# Patient Record
Sex: FEMALE | Race: WHITE | Employment: OTHER | URBAN - METROPOLITAN AREA
[De-identification: names, ages, dates, MRNs, and addresses within clinical notes are randomized per-mention and may not be internally consistent; named-entity substitution may affect disease eponyms.]

---

## 2017-03-08 ENCOUNTER — OFFICE VISIT (OUTPATIENT)
Dept: DERMATOLOGY | Age: 62
End: 2017-03-08

## 2017-03-08 DIAGNOSIS — L82.1 SEBORRHEIC KERATOSES: ICD-10-CM

## 2017-03-08 DIAGNOSIS — D22.9 BENIGN NEVUS: ICD-10-CM

## 2017-03-08 DIAGNOSIS — Z86.018 HISTORY OF DYSPLASTIC NEVUS: ICD-10-CM

## 2017-03-08 DIAGNOSIS — Z85.828 HISTORY OF BASAL CELL CANCER: ICD-10-CM

## 2017-03-08 DIAGNOSIS — D48.5 NEOPLASM OF UNCERTAIN BEHAVIOR OF SKIN: Primary | ICD-10-CM

## 2017-03-08 PROCEDURE — G8484 FLU IMMUNIZE NO ADMIN: HCPCS | Performed by: DERMATOLOGY

## 2017-03-08 PROCEDURE — 99213 OFFICE O/P EST LOW 20 MIN: CPT | Performed by: DERMATOLOGY

## 2017-03-08 PROCEDURE — 11101 PR BIOPSY, EACH ADDED LESION: CPT | Performed by: DERMATOLOGY

## 2017-03-08 PROCEDURE — 3014F SCREEN MAMMO DOC REV: CPT | Performed by: DERMATOLOGY

## 2017-03-08 PROCEDURE — 11100 PR BIOPSY OF SKIN LESION: CPT | Performed by: DERMATOLOGY

## 2017-03-08 PROCEDURE — G8427 DOCREV CUR MEDS BY ELIG CLIN: HCPCS | Performed by: DERMATOLOGY

## 2017-03-08 PROCEDURE — 1036F TOBACCO NON-USER: CPT | Performed by: DERMATOLOGY

## 2017-03-08 PROCEDURE — 3017F COLORECTAL CA SCREEN DOC REV: CPT | Performed by: DERMATOLOGY

## 2017-03-08 PROCEDURE — G8421 BMI NOT CALCULATED: HCPCS | Performed by: DERMATOLOGY

## 2017-03-14 ENCOUNTER — TELEPHONE (OUTPATIENT)
Dept: DERMATOLOGY | Age: 62
End: 2017-03-14

## 2017-03-15 ENCOUNTER — OFFICE VISIT (OUTPATIENT)
Dept: DERMATOLOGY | Age: 62
End: 2017-03-15

## 2017-03-15 DIAGNOSIS — C44.619 BASAL CELL CARCINOMA OF LEFT UPPER ARM: Primary | ICD-10-CM

## 2017-03-15 PROCEDURE — 17261 DSTRJ MAL LES T/A/L .6-1.0CM: CPT | Performed by: DERMATOLOGY

## 2017-03-15 PROCEDURE — 1036F TOBACCO NON-USER: CPT | Performed by: DERMATOLOGY

## 2017-07-10 ENCOUNTER — OFFICE VISIT (OUTPATIENT)
Dept: CARDIOLOGY CLINIC | Age: 62
End: 2017-07-10

## 2017-07-10 VITALS
BODY MASS INDEX: 23.05 KG/M2 | SYSTOLIC BLOOD PRESSURE: 120 MMHG | DIASTOLIC BLOOD PRESSURE: 70 MMHG | HEART RATE: 78 BPM | HEIGHT: 64 IN | WEIGHT: 135 LBS

## 2017-07-10 DIAGNOSIS — I34.0 MITRAL VALVE INSUFFICIENCY, UNSPECIFIED ETIOLOGY: ICD-10-CM

## 2017-07-10 DIAGNOSIS — I34.1 MVP (MITRAL VALVE PROLAPSE): ICD-10-CM

## 2017-07-10 DIAGNOSIS — I48.92 ATRIAL FLUTTER, PAROXYSMAL (HCC): Primary | Chronic | ICD-10-CM

## 2017-07-10 PROCEDURE — G8420 CALC BMI NORM PARAMETERS: HCPCS | Performed by: INTERNAL MEDICINE

## 2017-07-10 PROCEDURE — 3014F SCREEN MAMMO DOC REV: CPT | Performed by: INTERNAL MEDICINE

## 2017-07-10 PROCEDURE — 93000 ELECTROCARDIOGRAM COMPLETE: CPT | Performed by: INTERNAL MEDICINE

## 2017-07-10 PROCEDURE — G8427 DOCREV CUR MEDS BY ELIG CLIN: HCPCS | Performed by: INTERNAL MEDICINE

## 2017-07-10 PROCEDURE — 99214 OFFICE O/P EST MOD 30 MIN: CPT | Performed by: INTERNAL MEDICINE

## 2017-07-10 PROCEDURE — 1036F TOBACCO NON-USER: CPT | Performed by: INTERNAL MEDICINE

## 2017-07-10 PROCEDURE — 3017F COLORECTAL CA SCREEN DOC REV: CPT | Performed by: INTERNAL MEDICINE

## 2017-07-10 RX ORDER — DILTIAZEM HYDROCHLORIDE 60 MG/1
60 TABLET, FILM COATED ORAL PRN
Qty: 30 TABLET | Refills: 3 | Status: SHIPPED | OUTPATIENT
Start: 2017-07-10 | End: 2018-09-13 | Stop reason: SDUPTHER

## 2017-11-06 ENCOUNTER — OFFICE VISIT (OUTPATIENT)
Dept: DERMATOLOGY | Age: 62
End: 2017-11-06

## 2017-11-06 DIAGNOSIS — L71.9 ROSACEA: ICD-10-CM

## 2017-11-06 DIAGNOSIS — L57.0 KERATOSIS, ACTINIC: ICD-10-CM

## 2017-11-06 DIAGNOSIS — Z85.828 HISTORY OF BASAL CELL CANCER: ICD-10-CM

## 2017-11-06 DIAGNOSIS — D22.9 BENIGN NEVUS: ICD-10-CM

## 2017-11-06 DIAGNOSIS — L82.1 SEBORRHEIC KERATOSES: ICD-10-CM

## 2017-11-06 DIAGNOSIS — D48.5 NEOPLASM OF UNCERTAIN BEHAVIOR OF SKIN: Primary | ICD-10-CM

## 2017-11-06 DIAGNOSIS — Z86.018 HISTORY OF DYSPLASTIC NEVUS: ICD-10-CM

## 2017-11-06 PROCEDURE — 17000 DESTRUCT PREMALG LESION: CPT | Performed by: DERMATOLOGY

## 2017-11-06 PROCEDURE — 3014F SCREEN MAMMO DOC REV: CPT | Performed by: DERMATOLOGY

## 2017-11-06 PROCEDURE — 99214 OFFICE O/P EST MOD 30 MIN: CPT | Performed by: DERMATOLOGY

## 2017-11-06 PROCEDURE — G8427 DOCREV CUR MEDS BY ELIG CLIN: HCPCS | Performed by: DERMATOLOGY

## 2017-11-06 PROCEDURE — 1036F TOBACCO NON-USER: CPT | Performed by: DERMATOLOGY

## 2017-11-06 PROCEDURE — G8420 CALC BMI NORM PARAMETERS: HCPCS | Performed by: DERMATOLOGY

## 2017-11-06 PROCEDURE — 3017F COLORECTAL CA SCREEN DOC REV: CPT | Performed by: DERMATOLOGY

## 2017-11-06 PROCEDURE — G8484 FLU IMMUNIZE NO ADMIN: HCPCS | Performed by: DERMATOLOGY

## 2017-11-06 PROCEDURE — 11100 PR BIOPSY OF SKIN LESION: CPT | Performed by: DERMATOLOGY

## 2017-11-06 PROCEDURE — 17003 DESTRUCT PREMALG LES 2-14: CPT | Performed by: DERMATOLOGY

## 2017-11-06 NOTE — PROGRESS NOTES
[facility]) Dermatology  Francisco Hammer M.D.  [phone]       Judith [facility]  1955    60 y.o. female     Date of Visit: 11/6/2017    Chief Complaint:   Chief Complaint   Patient presents with    6 Month Follow-Up        I was asked to see this patient by Dr. Carrington ref. provider found. History of Present Illness:  1. Total body skin exam    Multiple rough papules on her nose. Remain dry. Not itching, bleeding, growing    Worsening erythema of nose and cheeks-occasional inflammatory papules on her nose. Mild scale nose and cheeks. Multiple nevi. Stable in size, shape, color. Asymptomatic. Skin history:  7/14 basal cell carcinoma nose [facility], status post Mohs surgery and forehead flap  11/15 right upper paraspinal back dysplastic nevus, mild, saucerization  11/15 right posterior shoulder dysplastic nevus, mild, extended the biopsy margins on initial biopsy- re-saucerization  4/16 right mid paraspinal back dysplastic nevus, mild  4/16 right anterior shoulder dysplastic nevus, mild  4/16 left mid upper arm dysplastic nevus, mild  3/17 nodular basal cell carcinoma left posterior upper arm status post curettage    AK- LN2    Rosacea- Metrocream .75% 11/17    Review of Systems:  Constitutional: Reports general sense of well-being       Past Medical History, Surgical History, Family History, Medications and Allergies reviewed. Social History:   Social History     Social History    Marital status:      Spouse name: N/A    Number of children: N/A    Years of education: N/A     Occupational History    Not on file. Social History Main Topics    Smoking status: Never Smoker    Smokeless tobacco: Never Used    Alcohol use Yes      Comment: occas    Drug use: No    Sexual activity: Not on file     Other Topics Concern    Not on file     Social History Narrative    No narrative on file       Physical Examination       -General: Well-appearing, NAD  Normal affect. MetroCream 0.75% b.i.d. Reviewed proper use and side effects    5. History of basal cell cancer - No evidence of recurrence. Discussed risk of subsequent skin cancers and increased risk of melanoma. Reviewed importance of monitoring skin for change and sun protection with hats and sunscreen, as well as sun avoidance. 6. History of dysplastic nevus -No evidence of recurrence    7. Actinic keratosis nose-5 lesion(s) treated w/ liquid nitrogen. Edu re: risk of blister formation, discomfort, scar, hypopigmentation. Discussed wound care.

## 2017-11-08 ENCOUNTER — TELEPHONE (OUTPATIENT)
Dept: DERMATOLOGY | Age: 62
End: 2017-11-08

## 2017-11-08 NOTE — TELEPHONE ENCOUNTER
Reviewed results of the biopsy with the patient. Date of biopsy: 11/6/17  Site of biopsy: Right upper PS back  Result: Dysplastic nevus with mild dysplasia, completely excised    Plan: No further treatment    The patient expressed understanding of the plan.

## 2017-11-14 ENCOUNTER — HOSPITAL ENCOUNTER (OUTPATIENT)
Dept: MAMMOGRAPHY | Age: 62
Discharge: HOME | End: 2017-11-14
Attending: OBSTETRICS & GYNECOLOGY | Admitting: OBSTETRICS & GYNECOLOGY

## 2017-11-14 DIAGNOSIS — Z12.31 VISIT FOR SCREENING MAMMOGRAM: ICD-10-CM

## 2017-11-17 ENCOUNTER — HOSPITAL ENCOUNTER (OUTPATIENT)
Dept: MAMMOGRAPHY | Age: 62
Discharge: HOME | End: 2017-11-17
Attending: OBSTETRICS & GYNECOLOGY | Admitting: OBSTETRICS & GYNECOLOGY

## 2017-11-17 DIAGNOSIS — R92.8 ABNORMAL FINDING ON BREAST IMAGING: ICD-10-CM

## 2018-01-22 ENCOUNTER — HOSPITAL ENCOUNTER (OUTPATIENT)
Dept: SURGERY | Age: 63
Discharge: HOME | End: 2018-01-22
Attending: ORTHOPAEDIC SURGERY | Admitting: ORTHOPAEDIC SURGERY

## 2018-01-22 VITALS
RESPIRATION RATE: 12 BRPM | HEIGHT: 65 IN | OXYGEN SATURATION: 98 % | SYSTOLIC BLOOD PRESSURE: 121 MMHG | HEART RATE: 84 BPM | DIASTOLIC BLOOD PRESSURE: 68 MMHG | TEMPERATURE: 98.6 F | BODY MASS INDEX: 22.26 KG/M2 | WEIGHT: 133.6 LBS

## 2018-01-22 DIAGNOSIS — S42.271A CLOSED TORUS FRACTURE OF PROXIMAL END OF RIGHT HUMERUS, INITIAL ENCOUNTER: Primary | ICD-10-CM

## 2018-01-22 LAB
ANION GAP SERPL CALCULATED.3IONS-SCNC: 11 MMOL/L (ref 3–16)
BUN BLDV-MCNC: 9 MG/DL (ref 7–20)
CALCIUM SERPL-MCNC: 9.4 MG/DL (ref 8.3–10.6)
CHLORIDE BLD-SCNC: 100 MMOL/L (ref 99–110)
CO2: 29 MMOL/L (ref 21–32)
CREAT SERPL-MCNC: 0.6 MG/DL (ref 0.6–1.2)
GFR AFRICAN AMERICAN: >60
GFR NON-AFRICAN AMERICAN: >60
GLUCOSE BLD-MCNC: 91 MG/DL (ref 70–99)
HCT VFR BLD CALC: 38.9 % (ref 36–48)
HEMOGLOBIN: 13.2 G/DL (ref 12–16)
MCH RBC QN AUTO: 31.5 PG (ref 26–34)
MCHC RBC AUTO-ENTMCNC: 34 G/DL (ref 31–36)
MCV RBC AUTO: 92.6 FL (ref 80–100)
PDW BLD-RTO: 12.4 % (ref 12.4–15.4)
PLATELET # BLD: 233 K/UL (ref 135–450)
PMV BLD AUTO: 9 FL (ref 5–10.5)
POTASSIUM REFLEX MAGNESIUM: 4.1 MMOL/L (ref 3.5–5.1)
RBC # BLD: 4.2 M/UL (ref 4–5.2)
SODIUM BLD-SCNC: 140 MMOL/L (ref 136–145)
WBC # BLD: 7.2 K/UL (ref 4–11)

## 2018-01-22 PROCEDURE — 99244 OFF/OP CNSLTJ NEW/EST MOD 40: CPT | Performed by: ORTHOPAEDIC SURGERY

## 2018-01-22 PROCEDURE — 23615 OPTX PROX HUMRL FX W/INT FIX: CPT | Performed by: ORTHOPAEDIC SURGERY

## 2018-01-22 PROCEDURE — 93010 ELECTROCARDIOGRAM REPORT: CPT | Performed by: INTERNAL MEDICINE

## 2018-01-22 RX ORDER — SODIUM CHLORIDE 9 MG/ML
INJECTION, SOLUTION INTRAVENOUS CONTINUOUS
Status: DISCONTINUED | OUTPATIENT
Start: 2018-01-22 | End: 2018-01-23 | Stop reason: HOSPADM

## 2018-01-22 RX ORDER — CEPHALEXIN 500 MG/1
500 CAPSULE ORAL 4 TIMES DAILY
Qty: 20 CAPSULE | Refills: 0 | Status: SHIPPED | OUTPATIENT
Start: 2018-01-22 | End: 2018-01-27

## 2018-01-22 RX ORDER — LABETALOL HYDROCHLORIDE 5 MG/ML
5 INJECTION, SOLUTION INTRAVENOUS EVERY 10 MIN PRN
Status: DISCONTINUED | OUTPATIENT
Start: 2018-01-22 | End: 2018-01-23 | Stop reason: HOSPADM

## 2018-01-22 RX ORDER — SODIUM CHLORIDE 0.9 % (FLUSH) 0.9 %
10 SYRINGE (ML) INJECTION PRN
Status: DISCONTINUED | OUTPATIENT
Start: 2018-01-22 | End: 2018-01-23 | Stop reason: HOSPADM

## 2018-01-22 RX ORDER — OXYCODONE HYDROCHLORIDE AND ACETAMINOPHEN 5; 325 MG/1; MG/1
1 TABLET ORAL EVERY 6 HOURS PRN
Qty: 28 TABLET | Refills: 0 | Status: SHIPPED | OUTPATIENT
Start: 2018-01-22 | End: 2018-01-29

## 2018-01-22 RX ORDER — PROMETHAZINE HYDROCHLORIDE 25 MG/ML
6.25 INJECTION, SOLUTION INTRAMUSCULAR; INTRAVENOUS
Status: ACTIVE | OUTPATIENT
Start: 2018-01-22 | End: 2018-01-22

## 2018-01-22 RX ORDER — SODIUM CHLORIDE 0.9 % (FLUSH) 0.9 %
10 SYRINGE (ML) INJECTION EVERY 12 HOURS SCHEDULED
Status: DISCONTINUED | OUTPATIENT
Start: 2018-01-22 | End: 2018-01-23 | Stop reason: HOSPADM

## 2018-01-22 RX ORDER — FENTANYL CITRATE 50 UG/ML
25 INJECTION, SOLUTION INTRAMUSCULAR; INTRAVENOUS EVERY 5 MIN PRN
Status: DISCONTINUED | OUTPATIENT
Start: 2018-01-22 | End: 2018-01-23 | Stop reason: HOSPADM

## 2018-01-22 RX ORDER — OXYCODONE HYDROCHLORIDE AND ACETAMINOPHEN 5; 325 MG/1; MG/1
2 TABLET ORAL
Status: COMPLETED | OUTPATIENT
Start: 2018-01-22 | End: 2018-01-22

## 2018-01-22 RX ADMIN — FENTANYL CITRATE 25 MCG: 50 INJECTION, SOLUTION INTRAMUSCULAR; INTRAVENOUS at 14:49

## 2018-01-22 RX ADMIN — SODIUM CHLORIDE: 9 INJECTION, SOLUTION INTRAVENOUS at 11:36

## 2018-01-22 RX ADMIN — FENTANYL CITRATE 25 MCG: 50 INJECTION, SOLUTION INTRAMUSCULAR; INTRAVENOUS at 14:36

## 2018-01-22 RX ADMIN — OXYCODONE HYDROCHLORIDE AND ACETAMINOPHEN 1 TABLET: 5; 325 TABLET ORAL at 16:09

## 2018-01-22 ASSESSMENT — PAIN DESCRIPTION - LOCATION
LOCATION: ARM

## 2018-01-22 ASSESSMENT — PAIN DESCRIPTION - DESCRIPTORS
DESCRIPTORS: CONSTANT;ACHING
DESCRIPTORS: ACHING
DESCRIPTORS: ACHING

## 2018-01-22 ASSESSMENT — PAIN DESCRIPTION - PROGRESSION
CLINICAL_PROGRESSION: GRADUALLY IMPROVING
CLINICAL_PROGRESSION: NOT CHANGED

## 2018-01-22 ASSESSMENT — PAIN - FUNCTIONAL ASSESSMENT: PAIN_FUNCTIONAL_ASSESSMENT: 0-10

## 2018-01-22 ASSESSMENT — PAIN DESCRIPTION - ORIENTATION
ORIENTATION: RIGHT
ORIENTATION: RIGHT;UPPER
ORIENTATION: RIGHT;UPPER

## 2018-01-22 ASSESSMENT — PAIN DESCRIPTION - FREQUENCY: FREQUENCY: CONTINUOUS

## 2018-01-22 ASSESSMENT — PAIN SCALES - GENERAL
PAINLEVEL_OUTOF10: 5
PAINLEVEL_OUTOF10: 6

## 2018-01-22 ASSESSMENT — PAIN DESCRIPTION - PAIN TYPE
TYPE: SURGICAL PAIN

## 2018-01-22 ASSESSMENT — PAIN DESCRIPTION - ONSET: ONSET: AWAKENED FROM SLEEP

## 2018-01-22 NOTE — H&P
Preoperative H&P Update    The patient's History and Physical in the medical record from 1/22/2018 was reviewed by me today. I reviewed the HPI, medications, allergies, reason for surgery, diagnosis and treatment plan and there has been no change. The patient was evaluated by me today. Physical exam findings for this update include: There were no vitals filed for this visit. Airway is intact  Chest: chest clear, no wheezing, rales, normal symmetric air entry, no tachypnea, retractions or cyanosis  Heart: regular rate and rhythm ; heart sounds normal  Findings on exam of the body region where surgery is to be performed include:  Right proximal humerus fracture.     Electronically signed by Lisa Irvin MD on 1/22/2018 at 11:03 AM

## 2018-01-22 NOTE — CONSULTS
[facility] Orthopedic Surgery  Consult Note        This patient is seen in consultation at the request of Cele Brizuela NP    Reason for Consult:   Right shoulder pain/ moderately displaced proximal humerus fracture. CHIEF COMPLAINT:   Right shoulder pain/ fall. History Obtained From:  patient, spouse, electronic medical record    HISTORY OF PRESENT ILLNESS:    Ms. Nilesh De Leon is a 60 y.o.  female right handed who presents today for consultation and evaluation of a right shoulder injury. The patient reports that this injury occurred when she fell on ice. She was first seen and evaluated in , when she was x-rayed and splinted, and asked to f/u with me. The patient denies any other injuries. Rates pain a 7/10 VAS moderate, sharp, intermittent and show no change. Alleviating factors rest. Movement makes the pain worse, the sling and resting makes the pain better. No numbness or tingling sensation. Past Medical History:        Diagnosis Date    Atrial flutter, paroxysmal ([address] 1/14    Dr Zuñiga= post viral infection no sx 2/2015    BCC (basal cell carcinoma), face 2014       Past Surgical History:        Procedure Laterality Date    COSMETIC SURGERY  2014    On nose    SKIN CANCER EXCISION  2014    BCC left nostril       Current Medications:   No current facility-administered medications for this encounter. Allergies:  Review of patient's allergies indicates no known allergies. Social History     Social History    Marital status:      Spouse name: N/A    Number of children: N/A    Years of education: N/A     Occupational History    Not on file.      Social History Main Topics    Smoking status: Never Smoker    Smokeless tobacco: Never Used    Alcohol use Yes      Comment: occas    Drug use: No    Sexual activity: Not on file     Other Topics Concern    Not on file     Social History Narrative    No narrative on file       Family History   Problem Relation Age of Onset tender over the remainder of the extremity. Range of motion is decreased secondary to pain over the right shoulder. The skin overlying the right shoulder is intact without evidence of lesion, laceration. Distal pulses are 2+ and symmetric bilaterally. Sensation is grossly intact to light touch and symmetric bilaterally. NEUROLOGIC:   Sensory:    Touch:                     Right Upper Extremity:  normal                   Left Upper Extremity:  normal                  Right Lower Extremity:  normal                  Left Lower Extremity:  normal        DATA:    CBC:   Lab Results   Component Value Date    WBC 5.9 02/14/2014    RBC 4.26 02/14/2014    HGB 13.1 02/14/2014    HCT 39.3 02/14/2014    MCV 92.3 02/14/2014    MCH 30.8 02/14/2014    MCHC 33.4 02/14/2014    RDW 12.7 02/14/2014     02/14/2014    MPV 8.6 02/14/2014     WBC:    Lab Results   Component Value Date    WBC 5.9 02/14/2014     PT/INR:    Lab Results   Component Value Date    PROTIME 10.4 02/13/2014    INR 0.93 02/13/2014     PTT:    Lab Results   Component Value Date    APTT 53.2 02/13/2014   [APTT    IMAGING: Xrays dated 1/20/2018, 3 views of right shoulder were reviewed, and showed moderately displaced proximal humerus fracture. IMPRESSION: Right shoulder pain/ moderately displaced proximal humerus fracture. PLAN:  I discussed the overall alignment of the fracture with the  patient, and treatment options including both surgical and non-surgical treatment, and that my recommendation would be an open reduction and internal fixation given the amount of displacement and comminution of the fracture. I discussed the risks and benefits of surgery with the patient, including but not limited to infection, bleeding, pain, injury to nerves or blood vessels failure of the surgery and need for additional surgery. All the patient's questions were answered. We discussed an expected post-operative course.   She  is understanding of this

## 2018-01-22 NOTE — PROGRESS NOTES
Awakened to name, OPA removed
Fentanyl 25mcg ivp for \"5\" right arm pain
Received from PACU. Admitted to Phase 2 care. Awake and alert, respirations easy and even. Oriented to room and surroundings. Continues with pain.
Report to Tamra Coy
To PACU from OR, unresponsive, OPA present
established preoperatively. 23.  The patient/caregiver demonstrates knowledge of the expected responses to the operative or invasive procedure. 20.  Patient/Caregiver has reduced anxiety. Interventions-Familiarize with environment and equipment.   21.  Other:  22.  Other:

## 2018-01-22 NOTE — ANESTHESIA PRE-OP
GI/Hepatic/Renal ROS            Endo/Other: Negative Endo/Other ROS                    Abdominal:           Vascular: negative vascular ROS. Anesthesia Plan      general     ASA 3       Induction: intravenous. MIPS: Postoperative opioids intended and Prophylactic antiemetics administered. Anesthetic plan and risks discussed with patient and spouse. Use of blood products discussed with patient and spouse whom consented to blood products. Plan discussed with CRNA. This pre-anesthesia assessment may be used as a history and physical.    DOS STAFF ADDENDUM:    Pt seen and examined, chart reviewed (including anesthesia, drug and allergy history). No interval changes to history and physical examination. Anesthetic plan, risks, benefits, alternatives, and personnel involved discussed with patient. Patient verbalized an understanding and agrees to proceed.       Luis Felipe Up MD  January 22, 2018  11:39 AM

## 2018-01-23 LAB
EKG ATRIAL RATE: 86 BPM
EKG DIAGNOSIS: NORMAL
EKG P AXIS: 72 DEGREES
EKG P-R INTERVAL: 134 MS
EKG Q-T INTERVAL: 376 MS
EKG QRS DURATION: 76 MS
EKG QTC CALCULATION (BAZETT): 449 MS
EKG R AXIS: 67 DEGREES
EKG T AXIS: 26 DEGREES
EKG VENTRICULAR RATE: 86 BPM

## 2018-02-07 ENCOUNTER — OFFICE VISIT (OUTPATIENT)
Dept: ORTHOPEDIC SURGERY | Age: 63
End: 2018-02-07

## 2018-02-07 VITALS
HEIGHT: 64 IN | RESPIRATION RATE: 16 BRPM | DIASTOLIC BLOOD PRESSURE: 66 MMHG | WEIGHT: 133 LBS | HEART RATE: 78 BPM | SYSTOLIC BLOOD PRESSURE: 109 MMHG | BODY MASS INDEX: 22.71 KG/M2

## 2018-02-07 DIAGNOSIS — S42.291A OTHER CLOSED DISPLACED FRACTURE OF PROXIMAL END OF RIGHT HUMERUS, INITIAL ENCOUNTER: Primary | ICD-10-CM

## 2018-02-07 PROCEDURE — 99024 POSTOP FOLLOW-UP VISIT: CPT | Performed by: NURSE PRACTITIONER

## 2018-02-07 NOTE — LETTER
indicated. The patient is advised to contact the primary care physician to follow-up for further evaluation. Reza Jaime CNP  If you have questions, please do not hesitate to call me. I look forward to following [facility] along with you.     Sincerely,        Sara Arias MD

## 2018-02-08 NOTE — PROGRESS NOTES
DIAGNOSIS: Right proximal humerus displaced fracture, status post ORIF. DATE OF SURGERY:  1/22/2018. HISTORY OF PRESENT ILLNESS:  Ms. Maurizio Zacarias  60 y.o.  female right handed, who came in today for 2 weeks postoperative visit. The patient denies any significant pain in the right shoulder. Rates pain a 0-1/10 VAS mild, aching, intermittent and are improving. Aggravating factors movement. Alleviating factors rest and sling. She has been working on gentle ROM. No numbness or tingling sensation. No fever or Chills. She is in a sling. PHYSICAL EXAMINATION:  The incision is healed well. No signs of any erythema or drainage. She has no pain with the active or passive range of motion of the right shoulder, but decrease ROM. She has intact sensation, distally, and  is neurovascularly intact. IMAGING:  Three views right shoulder taken today in the office showed anatomic alignment of the proximal humerus, plate and screws in good position, no loosening. IMPRESSION:  2 weeks out from right proximal humerus fracture, ORIF and doing very well. PLAN:  I have told the patient to work on ROM, NWB right arm for 6 weeks. The patient will come back for a follow up in 6 weeks. At that time, we will take 3 views of the right shoulder. As this patient has demonstrated risk factors for osteoporosis, such as age and evidence of a fracture, I have referred the patient back to the primary care physician for evaluation for osteoporosis, including consideration for DEXA scanning, if this is felt to be clinically indicated. The patient is advised to contact the primary care physician to follow-up for further evaluation.      Bina Tapia, CNP

## 2018-03-21 ENCOUNTER — OFFICE VISIT (OUTPATIENT)
Dept: ORTHOPEDIC SURGERY | Age: 63
End: 2018-03-21

## 2018-03-21 VITALS — HEIGHT: 64 IN | WEIGHT: 133 LBS | BODY MASS INDEX: 22.71 KG/M2 | RESPIRATION RATE: 16 BRPM

## 2018-03-21 DIAGNOSIS — S42.291A OTHER CLOSED DISPLACED FRACTURE OF PROXIMAL END OF RIGHT HUMERUS, INITIAL ENCOUNTER: Primary | ICD-10-CM

## 2018-03-21 PROCEDURE — 99024 POSTOP FOLLOW-UP VISIT: CPT | Performed by: NURSE PRACTITIONER

## 2018-03-21 NOTE — PROGRESS NOTES
DIAGNOSIS: Right proximal humerus displaced fracture, status post ORIF. DATE OF SURGERY:  1/22/2018. HISTORY OF PRESENT ILLNESS:  Ms. Savanna Olvera  60 y.o.  female right handed, who came in today for 8 weeks postoperative visit. The patient denies any significant pain in the right shoulder. Rates pain a 1-2/10 VAS mild, aching, intermittent and are improving. Aggravating factors movement and increased activity. Alleviating factors rest. She has been working on gentle ROM. No numbness or tingling sensation. No fever or Chills. PHYSICAL EXAMINATION:  The incision is healed well. No signs of any erythema or drainage. She has no pain with the active or passive range of motion of the right shoulder, but decrease ROM. She has intact sensation, distally, and  is neurovascularly intact. IMAGING:  Three views right shoulder taken today in the office showed anatomic alignment of the proximal humerus, plate and screws in good position, no loosening. IMPRESSION:  8 weeks out from right proximal humerus fracture, ORIF and doing very well. PLAN:  I have told the patient to work on ROM, and she would like to do this on her own. No heavy impact activities. The patient will come back for a follow up in 6 weeks. At that time, we will take 3 views of the right shoulder. PT if needed. As this patient has demonstrated risk factors for osteoporosis, such as age and evidence of a fracture, I have referred the patient back to the primary care physician for evaluation for osteoporosis, including consideration for DEXA scanning, if this is felt to be clinically indicated. The patient is advised to contact the primary care physician to follow-up for further evaluation.      Princess Vee, CNP

## 2018-05-02 ENCOUNTER — OFFICE VISIT (OUTPATIENT)
Dept: ORTHOPEDIC SURGERY | Age: 63
End: 2018-05-02

## 2018-05-02 VITALS
DIASTOLIC BLOOD PRESSURE: 66 MMHG | WEIGHT: 133 LBS | BODY MASS INDEX: 22.71 KG/M2 | RESPIRATION RATE: 16 BRPM | HEIGHT: 64 IN | SYSTOLIC BLOOD PRESSURE: 100 MMHG | HEART RATE: 93 BPM

## 2018-05-02 DIAGNOSIS — S42.291A OTHER CLOSED DISPLACED FRACTURE OF PROXIMAL END OF RIGHT HUMERUS, INITIAL ENCOUNTER: Primary | ICD-10-CM

## 2018-05-02 PROCEDURE — G8420 CALC BMI NORM PARAMETERS: HCPCS | Performed by: ORTHOPAEDIC SURGERY

## 2018-05-02 PROCEDURE — 1036F TOBACCO NON-USER: CPT | Performed by: ORTHOPAEDIC SURGERY

## 2018-05-02 PROCEDURE — 99213 OFFICE O/P EST LOW 20 MIN: CPT | Performed by: ORTHOPAEDIC SURGERY

## 2018-05-02 PROCEDURE — 3017F COLORECTAL CA SCREEN DOC REV: CPT | Performed by: ORTHOPAEDIC SURGERY

## 2018-05-02 PROCEDURE — G8427 DOCREV CUR MEDS BY ELIG CLIN: HCPCS | Performed by: ORTHOPAEDIC SURGERY

## 2018-05-09 ENCOUNTER — HOSPITAL ENCOUNTER (OUTPATIENT)
Dept: PHYSICAL THERAPY | Age: 63
Discharge: HOME | End: 2018-05-31
Admitting: ORTHOPAEDIC SURGERY

## 2018-05-09 ENCOUNTER — HOSPITAL ENCOUNTER (OUTPATIENT)
Dept: OTHER | Age: 63
Discharge: HOME | End: 2018-05-09
Attending: ORTHOPAEDIC SURGERY | Admitting: ORTHOPAEDIC SURGERY

## 2018-05-11 ENCOUNTER — HOSPITAL ENCOUNTER (OUTPATIENT)
Dept: PHYSICAL THERAPY | Age: 63
Discharge: HOME | End: 2018-05-12
Admitting: ORTHOPAEDIC SURGERY

## 2018-05-11 NOTE — FLOWSHEET NOTE
The pt. verbalized level of function and understanding. Home Exercise Program:   5/9/18 The patient demonstrated good tolerance to and understanding of the HEP. Written instructions have been issued. Manual Treatments:    DTM right Pec Minor and Subscap.   Mobs R GHJ distraction A-P and caudal ea grade 3  PROM stretch into ER, IR, Abd and Flex    Modalities:      Timed Code Treatment Minutes:  40    Total Treatment Minutes:  40    Treatment/Activity Tolerance:  [x] Patient tolerated treatment well [] Patient limited by fatigue  [] Patient limited by pain  [] Patient limited by other medical complications  [] Other:     Prognosis: [x] Good [] Fair  [] Poor    Patient Requires Follow-up: [x] Yes  [] No    Plan:   [x] Continue per plan of care [] Alter current plan (see comments)  [x] Plan of care initiated [] Hold pending MD visit [] Discharge     Plan for Next Session:  Begin thera Ex, continue man Rx    Electronically signed by:  Carito Jasso PTA

## 2018-05-14 ENCOUNTER — HOSPITAL ENCOUNTER (OUTPATIENT)
Dept: PHYSICAL THERAPY | Age: 63
Discharge: HOME | End: 2018-05-15
Admitting: ORTHOPAEDIC SURGERY

## 2018-05-14 NOTE — FLOWSHEET NOTE
IR Orange 30 x  Green 30 x  Yellow 15 x                     Mat ex     Prone flies ADD    Supine Cane assist ER 20\" x 5               HEP     Scap retraction 5\" 15-20 x 5/9 5/11   Sleeper's stretch 30\" x 4 5/9 5/11   Door ER stretch 30\" x 4 5/9 5/11   Supine ER cane assist ADD                     Other Therapeutic Activities:    5/9/18 Quick dash was discussed with and applied to the pt. The pt. verbalized level of function and understanding. Home Exercise Program:   5/9/18 The patient demonstrated good tolerance to and understanding of the HEP. Written instructions have been issued. Manual Treatments:    DTM right Pec Minor and Subscap.   Mobs R GHJ distraction A-P and caudal ea grade 3  PROM stretch into ER, IR, Abd and Flex    Modalities:      Timed Code Treatment Minutes:  40    Total Treatment Minutes:  40    Treatment/Activity Tolerance:  [x] Patient tolerated treatment well [] Patient limited by fatigue  [] Patient limited by pain  [] Patient limited by other medical complications  [] Other:     Prognosis: [x] Good [] Fair  [] Poor    Patient Requires Follow-up: [x] Yes  [] No    Plan:   [x] Continue per plan of care [] Alter current plan (see comments)  [x] Plan of care initiated [] Hold pending MD visit [] Discharge     Plan for Next Session:  Reassess, add prone flies    Electronically signed by:  Rhonda Mejia, PT

## 2018-05-16 ENCOUNTER — HOSPITAL ENCOUNTER (OUTPATIENT)
Dept: PHYSICAL THERAPY | Age: 63
Discharge: HOME | End: 2018-05-17
Admitting: ORTHOPAEDIC SURGERY

## 2018-05-16 NOTE — FLOWSHEET NOTE
Physical Therapy Daily Treatment Note  Date:  2018    Patient Name:  Vijay Cardozo    :  1955  MRN: [id number]  Restrictions/Precautions:    Pertinent Medical History:Pt reports being of good health  Medical/Treatment Diagnosis Information:  · Diagnosis: Other closed displaced fracture of proximal end of right humerus, initial encounter (S42.291A ICD-10-.09 ICD-9-CM)  · Treatment Diagnosis: Right shoulder/ UE dysfunction due to GHJ hypomobility and RC weakness  Insurance/Certification information:  PT Insurance Information: Medical [place]  Physician Information:  Referring Practitioner: Dr Montenegro Manuela of care signed (Y/N):  Routed 18  Visit# / total visits: 4  Pain level: 0-1/10     G-Code (if applicable):      Date / Visit # G-Code Applied:  /       Progress Note: []  Yes  [x]  No  Next due by: Visit #10      History of Injury: Pt was injured as a result of a slip and fall on ice , on  she had ORIF to repair the fracture, 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 18 Pt reports having no pain at her shoulder today.   18 Pt reports minimal right shoulder pain this am.    Objective:  Observation:   Test measurements:     ROM:  Date      Shldr flexion    Shldr abd  Shldr IR         Shldr ER   A P A P A P A P   Eval 121  105  32  51     136  103  50  52                                       Strength:  Date Shoulder flexion Shoulder abduction Shoulder IR Shoulder  ER Bicep   Eval 18                           Exercises:  Exercise/Equipment Resistance/Repetitions Other comments   UBE L2  2 min    OH pulleys 2 min    Wall stretch  Flex                       Scap 10\" x 12    T- Slide   Rows                  ER                   IR Orange 30 x  Green 30 x  Yellow 15 x                     Mat ex     Prone flies ADD    Supine Cane assist ER 20\" x 5               HEP     Scap retraction 5\" 15-20 x 5/9 5/11   Sleeper's stretch 30\" x 4 5/9 5/11   Door ER stretch 30\" x 4 5/9 5/11   Supine ER cane assist ADD                     Other Therapeutic Activities:    5/9/18 Quick dash was discussed with and applied to the pt. The pt. verbalized level of function and understanding. Home Exercise Program:   5/9/18 The patient demonstrated good tolerance to and understanding of the HEP. Written instructions have been issued. Manual Treatments:    DTM right Pec Minor and Subscap.   Mobs R GHJ distraction A-P and caudal ea grade 3  PROM stretch into ER, IR, Abd and Flex    Modalities:      Timed Code Treatment Minutes:  40    Total Treatment Minutes:  40    Treatment/Activity Tolerance:  [x] Patient tolerated treatment well [] Patient limited by fatigue  [] Patient limited by pain  [] Patient limited by other medical complications  [] Other:     Prognosis: [x] Good [] Fair  [] Poor    Patient Requires Follow-up: [x] Yes  [] No    Plan:   [x] Continue per plan of care [] Alter current plan (see comments)  [x] Plan of care initiated [] Hold pending MD visit [] Discharge     Plan for Next Session:  Reassess, add prone flies    Electronically signed by:  Maira Gamez, PT

## 2018-05-21 ENCOUNTER — HOSPITAL ENCOUNTER (OUTPATIENT)
Dept: PHYSICAL THERAPY | Age: 63
Discharge: HOME | End: 2018-05-22
Admitting: ORTHOPAEDIC SURGERY

## 2018-05-21 NOTE — FLOWSHEET NOTE
Physical Therapy Daily Treatment Note  Date:  2018    Patient Name:  Sara Del Rosario    :  1955  MRN: [id number]  Restrictions/Precautions:    Pertinent Medical History:Pt reports being of good health  Medical/Treatment Diagnosis Information:  · Diagnosis: Other closed displaced fracture of proximal end of right humerus, initial encounter (S42.291A ICD-10-.09 ICD-9-CM)  · Treatment Diagnosis: Right shoulder/ UE dysfunction due to GHJ hypomobility and RC weakness  Insurance/Certification information:  PT Insurance Information: Medical [place]  Physician Information:  Referring Practitioner: Dr Michael Jones of care signed (Y/N):  Routed 18  Visit# / total visits: 5  Pain level: 0-1/10     G-Code (if applicable):      Date / Visit # G-Code Applied:  /       Progress Note: []  Yes  [x]  No  Next due by: Visit #10      History of Injury: Pt was injured as a result of a slip and fall on ice , on  she had ORIF to repair the fracture, 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 18 Pt reports having no pain at her shoulder today. 18 Pt reports minimal right shoulder pain this am.  18 Pt reports she is moving her arm a little better but is not where she would like it to be yet.     Objective:  Observation:   Test measurements:     ROM:  Date      Shldr flexion    Shldr abd  Shldr IR         Shldr ER   A P A P A P A P   Eval 121  105  32  51     18  136  103  50  52                                       Strength:  Date Shoulder flexion Shoulder abduction Shoulder IR Shoulder  ER Bicep   Eval 18

## 2018-05-23 ENCOUNTER — HOSPITAL ENCOUNTER (OUTPATIENT)
Dept: PHYSICAL THERAPY | Age: 63
Discharge: HOME | End: 2018-05-24
Admitting: ORTHOPAEDIC SURGERY

## 2018-05-23 NOTE — FLOWSHEET NOTE
Physical Therapy Daily Treatment Note  Date:  2018    Patient Name:  Armen Hilton    :  1955  MRN: [id number]  Restrictions/Precautions:    Pertinent Medical History:Pt reports being of good health  Medical/Treatment Diagnosis Information:  · Diagnosis: Other closed displaced fracture of proximal end of right humerus, initial encounter (S42.291A ICD-10-.09 ICD-9-CM)  · Treatment Diagnosis: Right shoulder/ UE dysfunction due to GHJ hypomobility and RC weakness  Insurance/Certification information:  PT Insurance Information: Medical [place]  Physician Information:  Referring Practitioner: Dr Kiera Freed of care signed (Y/N):  Routed 18  Visit# / total visits: 6  Pain level: 0-1/10     G-Code (if applicable):      Date / Visit # G-Code Applied:  /       Progress Note: []  Yes  [x]  No  Next due by: Visit #10      History of Injury: Pt was injured as a result of a slip and fall on ice , on  she had ORIF to repair the fracture, 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 18 Pt reports having no pain at her shoulder today. 18 Pt reports minimal right shoulder pain this am.  18 Pt reports she is moving her arm a little better but is not where she would like it to be yet. 18  Patient reports rom is improving some.     Objective:  Observation:   Test measurements:     ROM:  Date      Shldr flexion    Shldr abd  Shldr IR         Shldr ER   A P A P A P A P   Eval 121  105  32  51     18  136  103  50  52                                       Strength:  Date Shoulder flexion Shoulder abduction Shoulder IR Shoulder  ER Bicep   Eval 5/9/18 5/16/18                           Exercises:  Exercise/Equipment Resistance/Repetitions Other comments   UBE L2  2 min    OH pulleys 3 min scaption focused    Wall stretch  Flex                       Scap 10\" x 12    T- Slide   Rows                  ER                   IR Orange 30 x  Green 30 x  Orange  15 x 2      Standing RUE reach behind back with LUE assist 30\" x 4              Mat ex     Prone flies ADD    Supine Cane assist ER 20\" x 5               HEP     Scap retraction 5\" 15-20 x 5/9 5/11   Sleeper's stretch 30\" x 4 5/9 5/11   Door ER stretch 30\" x 4 5/9 5/11   Supine ER cane assist ADD                     Other Therapeutic Activities:    5/9/18 Quick dash was discussed with and applied to the pt. The pt. verbalized level of function and understanding. Home Exercise Program:   5/9/18 The patient demonstrated good tolerance to and understanding of the HEP. Written instructions have been issued. Manual Treatments:  15 min  DTM right Pec Minor and Subscap.   Mobs R GHJ distraction A-P and caudal ea grade 3  PROM stretch into ER, IR, Abd and Flex    Modalities:    CP declined    Timed Code Treatment Minutes:  40    Total Treatment Minutes:  45    Treatment/Activity Tolerance:  [x] Patient tolerated treatment well [] Patient limited by fatigue  [] Patient limited by pain  [] Patient limited by other medical complications  [] Other:     Prognosis: [x] Good [] Fair  [] Poor    Patient Requires Follow-up: [x] Yes  [] No    Plan:   [x] Continue per plan of care [] Alter current plan (see comments)  [x] Plan of care initiated [] Hold pending MD visit [] Discharge     Plan for Next Session:  Reassess, add prone flies    Electronically signed by:  Umer Lau PTA

## 2018-05-30 ENCOUNTER — HOSPITAL ENCOUNTER (OUTPATIENT)
Dept: PHYSICAL THERAPY | Age: 63
Discharge: HOME | End: 2018-06-30
Admitting: ORTHOPAEDIC SURGERY

## 2018-05-30 NOTE — FLOWSHEET NOTE
Physical Therapy Daily Treatment Note  Date:  2018    Patient Name:  Abby Villafana    :  1955  MRN: [id number]  Restrictions/Precautions:    Pertinent Medical History:Pt reports being of good health  Medical/Treatment Diagnosis Information:  · Diagnosis: Other closed displaced fracture of proximal end of right humerus, initial encounter (S42.291A ICD-10-.09 ICD-9-CM)  · Treatment Diagnosis: Right shoulder/ UE dysfunction due to GHJ hypomobility and RC weakness  Insurance/Certification information:  PT Insurance Information: Medical [place]  Physician Information:  Referring Practitioner: Dr Chuck Crook of care signed (Y/N):  Routed 18  Visit# / total visits: 7  Pain level: 0-1/10     G-Code (if applicable):      Date / Visit # G-Code Applied:  /       Progress Note: []  Yes  [x]  No  Next due by: Visit #10      History of Injury: Pt was injured as a result of a slip and fall on ice , on  she had ORIF to repair the fracture, 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 18 Pt reports having no pain at her shoulder today. 18 Pt reports minimal right shoulder pain this am.  18 Pt reports she is moving her arm a little better but is not where she would like it to be yet. 18  Patient reports rom is improving some. 18 Pt reports no changes post last rx.     Objective:  Observation:   Test measurements:     ROM: Right shoulder  Date      Shldr flexion    Shldr abd  Shldr IR         Shldr ER   A P A P A P A P   Eval 121  105  32  51     18  136  103  50  52     18  136  127  48  60                            Strength:  Date

## 2018-06-01 ENCOUNTER — HOSPITAL ENCOUNTER (OUTPATIENT)
Dept: PHYSICAL THERAPY | Age: 63
Discharge: HOME | End: 2018-06-02
Admitting: ORTHOPAEDIC SURGERY

## 2018-06-01 ENCOUNTER — HOSPITAL ENCOUNTER (OUTPATIENT)
Dept: OTHER | Age: 63
Discharge: HOME | End: 2018-06-01
Attending: ORTHOPAEDIC SURGERY | Admitting: ORTHOPAEDIC SURGERY

## 2018-06-01 NOTE — FLOWSHEET NOTE
Physical Therapy Daily Treatment Note  Date:  2018    Patient Name:  Brigid Spencer    :  1955  MRN: [id number]  Restrictions/Precautions:    Pertinent Medical History:Pt reports being of good health  Medical/Treatment Diagnosis Information:  · Diagnosis: Other closed displaced fracture of proximal end of right humerus, initial encounter (S42.291A ICD-10-.09 ICD-9-CM)  · Treatment Diagnosis: Right shoulder/ UE dysfunction due to GHJ hypomobility and RC weakness  Insurance/Certification information:  PT Insurance Information: Medical [place]  Physician Information:  Referring Practitioner: Dr Lawson Minus of care signed (Y/N):  Routed 18  Visit# / total visits: 8  Pain level: 0-1/10     G-Code (if applicable):      Date / Visit # G-Code Applied:  /       Progress Note: []  Yes  [x]  No  Next due by: Visit #10      History of Injury: Pt was injured as a result of a slip and fall on ice , on  she had ORIF to repair the fracture, 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 18 Pt reports having no pain at her shoulder today. 18 Pt reports minimal right shoulder pain this am.  18 Pt reports she is moving her arm a little better but is not where she would like it to be yet. 18  Patient reports rom is improving some. 18 Pt reports no changes post last rx.  18 Patient reports shoulder mobility is improving.     Objective:  Observation:   Test measurements:     ROM: Right shoulder  Date      Shldr flexion    Shldr abd  Shldr IR         Shldr ER   A P A P A P A P   Eval 121  105  32  51     18  136  103  50  52     18

## 2018-06-04 ENCOUNTER — HOSPITAL ENCOUNTER (OUTPATIENT)
Dept: PHYSICAL THERAPY | Age: 63
Discharge: HOME | End: 2018-06-05
Admitting: ORTHOPAEDIC SURGERY

## 2018-06-04 NOTE — FLOWSHEET NOTE
Physical Therapy Daily Treatment Note  Date:  2018    Patient Name:  Crystal Strickland    :  1955  MRN: [id number]  Restrictions/Precautions:    Pertinent Medical History:Pt reports being of good health  Medical/Treatment Diagnosis Information:  · Diagnosis: Other closed displaced fracture of proximal end of right humerus, initial encounter (S42.291A ICD-10-.09 ICD-9-CM)  · Treatment Diagnosis: Right shoulder/ UE dysfunction due to GHJ hypomobility and RC weakness  Insurance/Certification information:  PT Insurance Information: Medical [place]  Physician Information:  Referring Practitioner: Dr Malcom Willard of care signed (Y/N):  Routed 18  Visit# / total visits: 9  Pain level: 0-1/10     G-Code (if applicable):      Date / Visit # G-Code Applied:  /       Progress Note: []  Yes  [x]  No  Next due by: Visit #10      History of Injury: Pt was injured as a result of a slip and fall on ice , on  she had ORIF to repair the fracture, 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 18 Pt reports having no pain at her shoulder today. 18 Pt reports minimal right shoulder pain this am.  18 Pt reports she is moving her arm a little better but is not where she would like it to be yet. 18  Patient reports rom is improving some. 18 Pt reports no changes post last rx.  18 Patient reports shoulder mobility is improving. 18 Pt reports her shoulder is moving a little better.   She has no pain this am.    Objective:  Observation:   Test measurements:     ROM: Right shoulder  Date      Shldr flexion    Shldr abd  Shldr IR         [employer] ER A P A P A P A P   Eval 121  105  32  51     5/16/18  136  103  50  52     5/30/18  136  127  48  60                            Strength:  Date Shoulder flexion Shoulder abduction Shoulder IR Shoulder  ER Bicep   Eval 5/9/18 5/30/18  4/5 4/5 4+/5 4/5 4+/5                     Exercises:  Exercise/Equipment Resistance/Repetitions Other comments   UBE L2  2 min    OH pulleys 3 min scaption focused    Wall stretch  Flex                       Scap 10\" x 12    T- Slide   Rows                  ER                   IR Orange 30 x  Yellow 30 x 2  Orange  30 x 2      Standing RUE reach behind back with LUE assist 30\" x 4              Mat ex     Prone flies     Supine Cane assist ER 20\" x 5    SL'ing ER 3\" 15 x 2          HEP     Scap retraction 5\" 15-20 x 5/9 5/11   Sleeper's stretch 30\" x 4 5/9 5/11   Door ER stretch 30\" x 4 5/9 5/11   Supine ER cane assist ADD    Wall assist flex 5\" 15 x 5/30   Towel assist RUE behind back 20\" x 6 6/4          Other Therapeutic Activities:    5/9/18 Quick dash was discussed with and applied to the pt. The pt. verbalized level of function and understanding. Home Exercise Program:   6/4/18 The patient demonstrated good tolerance to and understanding of the HEP. Written instructions have been issued. Manual Treatments:  15 min  DTM right Pec Minor and Subscap.   Mobs R GHJ distraction A-P and caudal ea grade 3  PROM stretch into ER, IR, Abd and Flex    Modalities:    CP declined    Timed Code Treatment Minutes:  45    Total Treatment Minutes:  50    Treatment/Activity Tolerance:  [x] Patient tolerated treatment well [] Patient limited by fatigue  [] Patient limited by pain  [] Patient limited by other medical complications  [] Other:     Prognosis: [x] Good [] Fair  [] Poor    Patient Requires Follow-up: [x] Yes  [] No    Plan:   [x] Continue per plan of care [] Alter current plan (see comments)  [x] Plan of care initiated [] Hold pending MD visit [] Discharge     Plan for Next Session:  Reassess,     Electronically signed by:  Marlin Soliz, PT

## 2018-06-06 ENCOUNTER — HOSPITAL ENCOUNTER (OUTPATIENT)
Dept: PHYSICAL THERAPY | Age: 63
Discharge: HOME | End: 2018-06-07
Admitting: ORTHOPAEDIC SURGERY

## 2018-06-06 NOTE — PLAN OF CARE
of the following symptoms in the last week. None Mild Moderate Severe Extreme   9. Arm, shoulder or hand pain    [x] 1  [] 2  [] 3  [] 4  [] 5   10. Tingling (pins and needles) in your arm, shoulder or hand    [x] 1  [] 2  [] 3  [] 4  [] 5      No Difficulty Mild Difficulty Moderate Difficulty Severe Difficulty So Much Difficulty That I Can't Sleep    11. During the past week, how much difficulty have you had sleeping because of the pain in your arm, shoulder or hand? [x] 1  [] 2  [] 3  [] 4  [] 5     Sum of Scores: __12__    QuickDASH Disability/Symptom Score (as found below): ____    QuickDASH Disability/Symptom Score =     A QuickDASH score may not be calculated if there is a greater than 1 missing item.     G-Code [place]:  Quick DASH Total Score Disability Index CMS Modifier   11 or less 0% []CH   12-19 1-19% [x]CI   20-28 20-39% []CJ   29-37 40-59% []CK   38-46 60-79% []CL   47-54 80-99% []CM   55 100% []CN

## 2018-06-11 ENCOUNTER — HOSPITAL ENCOUNTER (OUTPATIENT)
Dept: PHYSICAL THERAPY | Age: 63
Discharge: HOME | End: 2018-06-12
Admitting: ORTHOPAEDIC SURGERY

## 2018-06-11 NOTE — FLOWSHEET NOTE
stiffness. Objective:  Observation:   Test measurements:     ROM: Right shoulder  Date      Shldr flexion    Shldr abd  Shldr IR         Shldr ER   A P A P A P A P   Eval 121  105  32  51     5/16/18  136  103  50  52     5/30/18  136  127  48  60     6/6/18  138  132  50  68                 Strength:  Date Shoulder flexion Shoulder abduction Shoulder IR Shoulder  ER Bicep   Eval 5/9/18 5/30/18  4/5 4/5 4+/5 4/5 4+/5                     Exercises:  Exercise/Equipment Resistance/Repetitions Other comments   UBE L2  3 min    OH pulleys 3 min scaption focused    UE wall ranger 4 way 20 x ea    T- Slide   Rows                  ER                   IR Orange 30 x  Yellow 30 x 2  Orange  30 x 2      Standing RUE reach behind back with LUE assist     Open cans                         Mat ex     Prone flies  Resume   Supine Cane assist ER 20\" x 5    SL'ing ER 3\" 15 x 2 Add weight next rx         HEP     Scap retraction 5\" 15-20 x 5/9 5/11   Sleeper's stretch 30\" x 4 5/9 5/11   Door ER stretch 30\" x 4 5/9 5/11   Supine ER cane assist ADD    Wall assist flex 5\" 15 x 5/30, 6/6   Towel assist RUE behind back 20\" x 6 6/4          Other Therapeutic Activities:    5/9/18 Quick dash was discussed with and applied to the pt. The pt. verbalized level of function and understanding. Home Exercise Program:   6/6/18 The patient demonstrated good tolerance to and understanding of the HEP. Written instructions have been issued. Manual Treatments:  15 min  DTM right Pec Minor and Subscap.   Mobs R GHJ distraction A-P and caudal ea grade 3  PROM stretch into ER, IR, Abd and Flex    Modalities:    CP declined    Timed Code Treatment Minutes:  40    Total Treatment Minutes:  45    Treatment/Activity Tolerance:  [x] Patient tolerated treatment well [] Patient limited by fatigue  [] Patient limited by pain  [] Patient limited by other medical complications  [] Other:     Prognosis: [x] Good [] Fair  [] Poor    Patient Requires

## 2018-06-13 ENCOUNTER — HOSPITAL ENCOUNTER (OUTPATIENT)
Dept: PHYSICAL THERAPY | Age: 63
Discharge: HOME | End: 2018-06-14
Admitting: ORTHOPAEDIC SURGERY

## 2018-06-20 ENCOUNTER — HOSPITAL ENCOUNTER (OUTPATIENT)
Dept: PHYSICAL THERAPY | Age: 63
Discharge: HOME | End: 2018-06-21
Admitting: ORTHOPAEDIC SURGERY

## 2018-06-20 NOTE — FLOWSHEET NOTE
declined    Timed Code Treatment Minutes:  45    Total Treatment Minutes:  50    Treatment/Activity Tolerance:  [x] Patient tolerated treatment well [] Patient limited by fatigue  [] Patient limited by pain  [] Patient limited by other medical complications  [] Other:     Prognosis: [x] Good [] Fair  [] Poor    Patient Requires Follow-up: [x] Yes  [] No    Plan:   [x] Continue per plan of care [] Alter current plan (see comments)  [x] Plan of care initiated [] Hold pending MD visit [] Discharge     Plan for Next Session: Reassess ROM                                  Add to HEP T band rows and Ext to HEP    Electronically signed by:  Kayleigh Garcia PTA

## 2018-06-25 ENCOUNTER — HOSPITAL ENCOUNTER (OUTPATIENT)
Dept: PHYSICAL THERAPY | Age: 63
Discharge: HOME | End: 2018-06-26
Admitting: ORTHOPAEDIC SURGERY

## 2018-06-28 ENCOUNTER — HOSPITAL ENCOUNTER (OUTPATIENT)
Dept: PHYSICAL THERAPY | Age: 63
Discharge: HOME | End: 2018-06-29
Admitting: ORTHOPAEDIC SURGERY

## 2018-06-28 NOTE — DISCHARGE SUMMARY
Physical Therapy Discharge Note  Date: 2018    Patient Name: Inna Almaguer  : 1955  MRN: [id number]  Pertinent Medical History:Pt reports being of good health  Medical/Treatment Diagnosis Information:  · Diagnosis: Other closed displaced fracture of proximal end of right humerus, initial encounter (S42.291A ICD-10-.09 ICD-9-CM)  · Treatment Diagnosis: Right shoulder/ UE dysfunction due to GHJ hypomobility and RC weakness  Insurance/Certification information:  PT Insurance Information: Medical [place]  Physician Information:  Referring Practitioner: Dr Yair Soares      Dates of Pedro Hong to 18  Total # of Visits:16  Cancel/No Shows to date:0    Medicare Cap Total for 2018:    G-code (if applicable):    Date G-code Applied:  PT G-Codes  Score: 11  Self Care Current Status (): 0 percent impaired, limited or restricted  Self Care Discharge Status (): 0 percent impaired, limited or restricted    Treatment to Date:  [x] Therapeutic Exercise  [] Modalities:  [x] Therapeutic Activity     [] Ultrasound  [] Electrical Stim   [] Gait Training      [] Cervical Traction    [] Lumbar Traction  [x] Neuromuscular Re-education  [x] Cold/hotpack [] Iontophoresis  [x] Instruction in HEP      Other:  [x] Manual Therapy       []    [] Aquatic Therapy       []                    ? Significant Findings At Last Visit:    Subjective: 18 Pt reports no pain or problems at her shoulder today.           Objective:  Observation:  Test measurements:   ROM: Right shoulder  Date                    Shldr flexion             Shldr abd                Shldr IR                  Shldr ER    A P A P A P A P   Eval 121   105   32   51      18  136   103   50   52      18  136   127   48   60      18  138   132   50   68      18  143   136   59   71      18 142   137    60    78                            Strength:  Date Shoulder flexion Shoulder abduction Shoulder IR Shoulder  ER Bicep   Eval 5/9/18 5/30/18  4/5 4/5 4+/5 4/5 4+/5    6/25/18 4+/5 4+/5 5/5 4+/5 5/5                          Goal Status:  [x] Achieved [x] Partially Achieved  [] Not Achieved     Reason for Discharge:  [] Goals Met   [] Patient did not return after initial evaluation   [x] Progress Plateaued [] Missed _____ scheduled appointments   [] No insurance coverage [] Patient terminated therapy   [] Other:        PT recommendation:   [x] Patient now discharged with HEP ( every other day)      [] Other:    Discharge discussed with:  [x] Patient  [] Caregiver      [] Other        Electronically signed by:  Walter Majano, PT    If you have any questions or concerns, please don't hesitate to call.   Thank you for your referral.

## 2018-06-28 NOTE — FLOWSHEET NOTE
Physical Therapy Daily Treatment Note  Date:  2018    Patient Name:  Vijay Cardozo    :  1955  MRN: [id number]  Restrictions/Precautions:    Pertinent Medical History:Pt reports being of good health  Medical/Treatment Diagnosis Information:  · Diagnosis: Other closed displaced fracture of proximal end of right humerus, initial encounter (S42.291A ICD-10-.09 ICD-9-CM)  · Treatment Diagnosis: Right shoulder/ UE dysfunction due to GHJ hypomobility and RC weakness  Insurance/Certification information:  PT Insurance Information: Medical [place]  Physician Information:  Referring Practitioner: Dr Montenegro Manuela of care signed (Y/N):  Routed 18  Visit# / total visits: 16  Pain level: 0/10     G-Code (if applicable):      Date / Visit # G-Code Applied:  /       Progress Note: []  Yes  [x]  No  Next due by: Visit #10      History of Injury: Pt was injured as a result of a slip and fall on ice , on  she had ORIF to repair the fracture, 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 18 Pt reports having no pain at her shoulder today. 18 Pt reports minimal right shoulder pain this am.  18 Pt reports she is moving her arm a little better but is not where she would like it to be yet. 18  Patient reports rom is improving some. 18 Pt reports no changes post last rx.  18 Patient reports shoulder mobility is improving. 18 Pt reports her shoulder is moving a little better. She has no pain this am.  18 Pt reports having no pain at this time. 18 Patient reports shoulder has been doing well,just slight stiffness.   18 Pt reports no pain in her shoulder this am.  6/18/18 Pt reports progress is slow but she is reaching behind back better. 06/20/18 Patient reports shoulder rom is improving.  6/25/18 Pt reports  She is able to better reach behind her back. 6/28/18 Pt reports no pain or problems at her shoulder today. Objective:  Observation:   Test measurements:     ROM: Right shoulder  Date      Shldr flexion    Shldr abd  Shldr IR         Shldr ER   A P A P A P A P   Eval 121  105  32  51     5/16/18  136  103  50  52     5/30/18  136  127  48  60     6/6/18  138  132  50  68     6/13/18  143  136  59  71     6/25/18 142  137                     Strength:  Date Shoulder flexion Shoulder abduction Shoulder IR Shoulder  ER Bicep   Eval 5/9/18 5/30/18  4/5 4/5 4+/5 4/5 4+/5    6/25/18 4+/5 4+/5 5/5 4+/5 5/5              Exercises:  Exercise/Equipment Resistance/Repetitions Other comments   UBE L2  3 min    OH pulleys 3 min scaption focused    UE wall ranger 4 way     T- Slide   Rows                  Ext                  ER                   IR                   Add Orange 30 x  Orange 30 x  Yellow 30 x 2  Orange  30 x 2  Orange  30 x      Standing RUE reach behind back with LUE assist     Open cans                         Mat ex     Prone flies 3\" 1# 15 x2 Resume   cane assist ER 20 x    SL'ing ER 2# 15 x 2 Add weight next rx         HEP     Scap retraction 5\" 15-20 x 5/9 5/11, 6/25   Sleeper's stretch 30\" x 4 5/9 5/11, 6/28   Door ER stretch 30\" x 4 5/9 5/11   Supine ER cane assist 1-2# 15 x 2-3 6/28   Wall assist flex 5\" 15 x 5/30, 6/6   Towel assist RUE behind back 20\" x 6 6/4, 6/18, 6/25, 6/28   Open can 1# with mirror 15 x 2 6/28   T Band rows               ext Blue 15 x    blue 15 x  6/20               Other Therapeutic Activities:    6/28/18 Quick dash was discussed with and applied to the pt. The pt. verbalized level of function and understanding.     Home Exercise Program:   6/28/18 The patient demonstrated good

## 2018-07-01 ENCOUNTER — HOSPITAL ENCOUNTER (OUTPATIENT)
Dept: OTHER | Age: 63
Discharge: HOME | End: 2018-07-31
Attending: ORTHOPAEDIC SURGERY | Admitting: ORTHOPAEDIC SURGERY

## 2018-08-08 ENCOUNTER — PRE-EVALUATION (OUTPATIENT)
Dept: ORTHOPEDIC SURGERY | Age: 63
End: 2018-08-08

## 2018-08-08 ENCOUNTER — OFFICE VISIT (OUTPATIENT)
Dept: ORTHOPEDIC SURGERY | Age: 63
End: 2018-08-08

## 2018-08-08 VITALS
HEART RATE: 78 BPM | WEIGHT: 133 LBS | HEIGHT: 64 IN | BODY MASS INDEX: 22.71 KG/M2 | DIASTOLIC BLOOD PRESSURE: 66 MMHG | SYSTOLIC BLOOD PRESSURE: 106 MMHG

## 2018-08-08 DIAGNOSIS — S42.291A OTHER CLOSED DISPLACED FRACTURE OF PROXIMAL END OF RIGHT HUMERUS, INITIAL ENCOUNTER: ICD-10-CM

## 2018-08-08 DIAGNOSIS — S42.291A OTHER CLOSED DISPLACED FRACTURE OF PROXIMAL END OF RIGHT HUMERUS, INITIAL ENCOUNTER: Primary | ICD-10-CM

## 2018-08-08 PROCEDURE — 1036F TOBACCO NON-USER: CPT | Performed by: ORTHOPAEDIC SURGERY

## 2018-08-08 PROCEDURE — 99213 OFFICE O/P EST LOW 20 MIN: CPT | Performed by: ORTHOPAEDIC SURGERY

## 2018-08-08 PROCEDURE — 3017F COLORECTAL CA SCREEN DOC REV: CPT | Performed by: ORTHOPAEDIC SURGERY

## 2018-08-08 PROCEDURE — G8420 CALC BMI NORM PARAMETERS: HCPCS | Performed by: ORTHOPAEDIC SURGERY

## 2018-08-08 PROCEDURE — APPSS15 APP SPLIT SHARED TIME 0-15 MINUTES: Performed by: NURSE PRACTITIONER

## 2018-08-08 PROCEDURE — G8427 DOCREV CUR MEDS BY ELIG CLIN: HCPCS | Performed by: ORTHOPAEDIC SURGERY

## 2018-09-05 ENCOUNTER — OFFICE VISIT (OUTPATIENT)
Dept: DERMATOLOGY | Age: 63
End: 2018-09-05

## 2018-09-05 DIAGNOSIS — D48.5 NEOPLASM OF UNCERTAIN BEHAVIOR OF SKIN: Primary | ICD-10-CM

## 2018-09-05 DIAGNOSIS — Z86.018 HISTORY OF DYSPLASTIC NEVUS: ICD-10-CM

## 2018-09-05 DIAGNOSIS — Z85.828 HISTORY OF BASAL CELL CANCER: ICD-10-CM

## 2018-09-05 DIAGNOSIS — L82.1 SEBORRHEIC KERATOSES: ICD-10-CM

## 2018-09-05 DIAGNOSIS — D22.9 BENIGN NEVUS: ICD-10-CM

## 2018-09-05 DIAGNOSIS — L71.9 ROSACEA: ICD-10-CM

## 2018-09-05 PROCEDURE — 3017F COLORECTAL CA SCREEN DOC REV: CPT | Performed by: DERMATOLOGY

## 2018-09-05 PROCEDURE — 99214 OFFICE O/P EST MOD 30 MIN: CPT | Performed by: DERMATOLOGY

## 2018-09-05 PROCEDURE — G8420 CALC BMI NORM PARAMETERS: HCPCS | Performed by: DERMATOLOGY

## 2018-09-05 PROCEDURE — G8427 DOCREV CUR MEDS BY ELIG CLIN: HCPCS | Performed by: DERMATOLOGY

## 2018-09-05 PROCEDURE — 1036F TOBACCO NON-USER: CPT | Performed by: DERMATOLOGY

## 2018-09-05 PROCEDURE — 11100 PR BIOPSY OF SKIN LESION: CPT | Performed by: DERMATOLOGY

## 2018-09-05 NOTE — PROGRESS NOTES
[facility]) Dermatology  Malena Choi M.D.  [phone]       Toñito Arauz  1955    58 y.o. female     Date of Visit: 9/5/2018    Chief Complaint:   Chief Complaint   Patient presents with    Skin Lesion        I was asked to see this patient by Dr. Carrington ref. provider found. History of Present Illness:  1. Total body skin exam    Rosacea-predominantly vascular with occasional inflammatory papule. Has been prescribed MetroCream in the past, but decided not use this. Not bothersome. Multiple nevi. Stable in size, shape, color. She has not noticed any new or changing pigmented lesions. Does monitor her skin for change    Seborrheic keratoses over her torso-increasing in size and number but asymptomatic. Not itching, bleeding. Skin history:  7/14 basal cell carcinoma nose [facility], status post Mohs surgery and forehead flap  11/15 right upper paraspinal back dysplastic nevus, mild, saucerization  11/15 right posterior shoulder dysplastic nevus, mild, extended the biopsy margins on initial biopsy- re-saucerization  4/16 right mid paraspinal back dysplastic nevus, mild  4/16 right anterior shoulder dysplastic nevus, mild  4/16 left mid upper arm dysplastic nevus, mild  3/17 nodular basal cell carcinoma left posterior upper arm status post curettage  11/17 right upper paraspinal back dysplastic nevus with moderate dysplasia    AK- LN2     Rosacea- Metrocream .75% 11/17    Review of Systems:  Constitutional: Reports general sense of well-being   Broke right arm January 2018, healing well    Past Medical History, Surgical History, Family History, Medications and Allergies reviewed. Social History:   Social History     Social History    Marital status:      Spouse name: N/A    Number of children: N/A    Years of education: N/A     Occupational History    Not on file.      Social History Main Topics    Smoking status: Never Smoker    Smokeless tobacco: Never Used   [facility] (including hat and sunglasses), sunscreen use (water resistant, broad spectrum, SPF at least 30, need for reapplication every 2 to 3 hours), avoidance of tanning beds      3. Seborrheic keratoses -Monitor for change    4. Rosacea-No treatment for now    5. History of basal cell cancer - No evidence of recurrence. Discussed risk of subsequent skin cancers and increased risk of melanoma. Reviewed importance of monitoring skin for change and sun protection with hats and sunscreen, as well as sun avoidance.      6. History of dysplastic nevus -No evidence of recurrence

## 2018-09-07 LAB — DERMATOLOGY PATHOLOGY REPORT: NORMAL

## 2018-09-13 ENCOUNTER — OFFICE VISIT (OUTPATIENT)
Dept: CARDIOLOGY CLINIC | Age: 63
End: 2018-09-13

## 2018-09-13 VITALS
BODY MASS INDEX: 23.1 KG/M2 | WEIGHT: 134.6 LBS | HEART RATE: 76 BPM | SYSTOLIC BLOOD PRESSURE: 110 MMHG | DIASTOLIC BLOOD PRESSURE: 74 MMHG

## 2018-09-13 DIAGNOSIS — I48.92 ATRIAL FLUTTER, PAROXYSMAL (HCC): Chronic | ICD-10-CM

## 2018-09-13 PROCEDURE — 1036F TOBACCO NON-USER: CPT | Performed by: INTERNAL MEDICINE

## 2018-09-13 PROCEDURE — G8420 CALC BMI NORM PARAMETERS: HCPCS | Performed by: INTERNAL MEDICINE

## 2018-09-13 PROCEDURE — 3017F COLORECTAL CA SCREEN DOC REV: CPT | Performed by: INTERNAL MEDICINE

## 2018-09-13 PROCEDURE — 99214 OFFICE O/P EST MOD 30 MIN: CPT | Performed by: INTERNAL MEDICINE

## 2018-09-13 PROCEDURE — G8427 DOCREV CUR MEDS BY ELIG CLIN: HCPCS | Performed by: INTERNAL MEDICINE

## 2018-09-13 RX ORDER — DILTIAZEM HYDROCHLORIDE 60 MG/1
60 TABLET, FILM COATED ORAL PRN
Qty: 30 TABLET | Refills: 3 | Status: SHIPPED | OUTPATIENT
Start: 2018-09-13 | End: 2020-04-21 | Stop reason: ALTCHOICE

## 2019-03-06 ENCOUNTER — OFFICE VISIT (OUTPATIENT)
Dept: DERMATOLOGY | Age: 64
End: 2019-03-06
Payer: COMMERCIAL

## 2019-03-06 DIAGNOSIS — L71.9 ROSACEA: Primary | ICD-10-CM

## 2019-03-06 DIAGNOSIS — Z85.828 HISTORY OF BASAL CELL CANCER: ICD-10-CM

## 2019-03-06 DIAGNOSIS — D48.5 NEOPLASM OF UNCERTAIN BEHAVIOR OF SKIN: ICD-10-CM

## 2019-03-06 DIAGNOSIS — L21.9 SEBORRHEIC DERMATITIS: ICD-10-CM

## 2019-03-06 DIAGNOSIS — Z86.018 HISTORY OF DYSPLASTIC NEVUS: ICD-10-CM

## 2019-03-06 DIAGNOSIS — R23.8 PAPULE: ICD-10-CM

## 2019-03-06 DIAGNOSIS — D22.9 BENIGN NEVUS: ICD-10-CM

## 2019-03-06 PROCEDURE — G8420 CALC BMI NORM PARAMETERS: HCPCS | Performed by: DERMATOLOGY

## 2019-03-06 PROCEDURE — G8427 DOCREV CUR MEDS BY ELIG CLIN: HCPCS | Performed by: DERMATOLOGY

## 2019-03-06 PROCEDURE — 3017F COLORECTAL CA SCREEN DOC REV: CPT | Performed by: DERMATOLOGY

## 2019-03-06 PROCEDURE — 11102 TANGNTL BX SKIN SINGLE LES: CPT | Performed by: DERMATOLOGY

## 2019-03-06 PROCEDURE — 1036F TOBACCO NON-USER: CPT | Performed by: DERMATOLOGY

## 2019-03-06 PROCEDURE — G8484 FLU IMMUNIZE NO ADMIN: HCPCS | Performed by: DERMATOLOGY

## 2019-03-06 PROCEDURE — 99214 OFFICE O/P EST MOD 30 MIN: CPT | Performed by: DERMATOLOGY

## 2019-03-06 RX ORDER — PIMECROLIMUS 10 MG/G
CREAM TOPICAL
Qty: 1 BOTTLE | Refills: 4 | Status: SHIPPED | OUTPATIENT
Start: 2019-03-06 | End: 2020-09-14 | Stop reason: SDUPTHER

## 2019-03-08 LAB — DERMATOLOGY PATHOLOGY REPORT: NORMAL

## 2019-06-10 ENCOUNTER — HOSPITAL ENCOUNTER (OUTPATIENT)
Dept: MAMMOGRAPHY | Age: 64
Discharge: HOME | End: 2019-06-10
Payer: MEDICARE

## 2019-06-10 DIAGNOSIS — Z12.31 VISIT FOR SCREENING MAMMOGRAM: ICD-10-CM

## 2019-06-10 PROCEDURE — 77063 BREAST TOMOSYNTHESIS BI: CPT

## 2019-09-09 ENCOUNTER — OFFICE VISIT (OUTPATIENT)
Dept: DERMATOLOGY | Age: 64
End: 2019-09-09
Payer: MEDICARE

## 2019-09-09 DIAGNOSIS — L71.9 ROSACEA: ICD-10-CM

## 2019-09-09 DIAGNOSIS — D48.5 NEOPLASM OF UNCERTAIN BEHAVIOR OF SKIN: Primary | ICD-10-CM

## 2019-09-09 DIAGNOSIS — L21.9 SEBORRHEIC DERMATITIS: ICD-10-CM

## 2019-09-09 DIAGNOSIS — Z86.018 HISTORY OF DYSPLASTIC NEVUS: ICD-10-CM

## 2019-09-09 DIAGNOSIS — D22.9 BENIGN NEVUS: ICD-10-CM

## 2019-09-09 DIAGNOSIS — Z85.828 HISTORY OF BASAL CELL CANCER: ICD-10-CM

## 2019-09-09 PROCEDURE — 99214 OFFICE O/P EST MOD 30 MIN: CPT | Performed by: DERMATOLOGY

## 2019-09-25 ENCOUNTER — PROCEDURE VISIT (OUTPATIENT)
Dept: SURGERY | Age: 64
End: 2019-09-25
Payer: MEDICARE

## 2019-09-25 DIAGNOSIS — D48.5 NEOPLASM OF UNCERTAIN BEHAVIOR OF SKIN: Primary | ICD-10-CM

## 2019-09-25 PROCEDURE — 67810 INCAL BX EYELID SKN LID MRGN: CPT | Performed by: DERMATOLOGY

## 2019-09-25 NOTE — PATIENT INSTRUCTIONS
[facility] Office Hours:    Monday-Thursday  7:30 AM-4:30 PM    Friday  9:00 AM-3:00 PM    Biopsy Wound Care Instructions   Cleanse the wound with mild soapy water daily.  Gently dry the area.  Apply Vaseline or petroleum jelly to the wound using a cotton tipped applicator.  Cover with a clean bandage.  Repeat this process until the biopsy site is healed.  If you had stitches placed, continue treating the site until the stitches are removed. Remember to make an appointment to return to have your stitches removed by our staff.  You may shower and bathe as usual.   ** Biopsy results generally take around 7 business days to come back. If you have not heard from us by then, please call the office at [phone] between [address] [place] [address] Monday through Friday.

## 2019-10-01 ENCOUNTER — TELEPHONE (OUTPATIENT)
Dept: SURGERY | Age: 64
End: 2019-10-01

## 2019-10-02 ENCOUNTER — TELEPHONE (OUTPATIENT)
Dept: SURGERY | Age: 64
End: 2019-10-02

## 2019-12-04 ENCOUNTER — OFFICE VISIT (OUTPATIENT)
Dept: FAMILY MEDICINE CLINIC | Age: 64
End: 2019-12-04
Payer: MEDICARE

## 2019-12-04 VITALS
BODY MASS INDEX: 22.26 KG/M2 | DIASTOLIC BLOOD PRESSURE: 68 MMHG | RESPIRATION RATE: 18 BRPM | HEART RATE: 70 BPM | SYSTOLIC BLOOD PRESSURE: 112 MMHG | WEIGHT: 133.6 LBS | TEMPERATURE: 98.6 F | HEIGHT: 65 IN

## 2019-12-04 DIAGNOSIS — C44.310 FACIAL BASAL CELL CANCER: Primary | ICD-10-CM

## 2019-12-04 DIAGNOSIS — Z01.818 PRE-OP EXAM: ICD-10-CM

## 2019-12-04 PROCEDURE — 99203 OFFICE O/P NEW LOW 30 MIN: CPT | Performed by: NURSE PRACTITIONER

## 2019-12-09 ENCOUNTER — PROCEDURE VISIT (OUTPATIENT)
Dept: SURGERY | Age: 64
End: 2019-12-09
Payer: MEDICARE

## 2019-12-09 VITALS
DIASTOLIC BLOOD PRESSURE: 70 MMHG | HEART RATE: 80 BPM | OXYGEN SATURATION: 100 % | SYSTOLIC BLOOD PRESSURE: 116 MMHG | TEMPERATURE: 97.8 F

## 2019-12-09 DIAGNOSIS — C44.1122 BASAL CELL CARCINOMA (BCC) OF RIGHT LOWER EYELID: Primary | ICD-10-CM

## 2019-12-09 PROCEDURE — 17312 MOHS ADDL STAGE: CPT | Performed by: DERMATOLOGY

## 2019-12-09 PROCEDURE — 17311 MOHS 1 STAGE H/N/HF/G: CPT | Performed by: DERMATOLOGY

## 2019-12-11 ENCOUNTER — TELEPHONE (OUTPATIENT)
Dept: SURGERY | Age: 64
End: 2019-12-11

## 2020-03-09 ENCOUNTER — OFFICE VISIT (OUTPATIENT)
Dept: DERMATOLOGY | Age: 65
End: 2020-03-09
Payer: MEDICARE

## 2020-03-09 PROCEDURE — 99213 OFFICE O/P EST LOW 20 MIN: CPT | Performed by: DERMATOLOGY

## 2020-03-09 NOTE — PROGRESS NOTES
[facility]  Randy Schofield M.D.  [phone]       Manuel Lopez  1955    71 y.o. female     Date of Visit: 3/9/2020    Chief Complaint:   Chief Complaint   Patient presents with    Skin Lesion     tbse        I was asked to see this patient by Dr. Carrington ref. provider found. History of Present Illness:  1. Total-body skin exam    Status post Mohs right lower eyelid for basal cell carcinoma-healing without difficulty. Still has a drain in place-planned removal by Dr. Cara Anaya in May. Multiple nevi. Stable in size, shape, color. Has not noticed any new or changing pigmented lesions    Skin history:  7/14 basal cell carcinoma nose [facility], status post Mohs surgery and forehead flap  11/15 right upper paraspinal back dysplastic nevus, mild, saucerization  11/15 right posterior shoulder dysplastic nevus, mild, extended the biopsy margins on initial biopsy- re-saucerization  4/16 right mid paraspinal back dysplastic nevus, mild  4/16 right anterior shoulder dysplastic nevus, mild  4/16 left mid upper arm dysplastic nevus, mild  3/17 nodular basal cell carcinoma left posterior upper arm status post curettage  11/17 right upper paraspinal back dysplastic nevus with moderate dysplasia  9/18 right lower back dysplastic nevus with mild dysplasia  3/19 left mid lateral back irritated compound nevus, recurrent 9/19 9/19 basal cell carcinoma right lower eyelid status post Mohs and oculoplastics reconstruction     AK- LN2     Rosacea- Metrocream .75% 11/17    Review of Systems:  Constitutional: Reports general sense of well-being       Past Medical History, Surgical History, Family History, Medications and Allergies reviewed.     Social History:   Social History     Socioeconomic History    Marital status:      Spouse name: Not on file    Number of children: Not on file    Years of education: Not on file    Highest education level: Not on file   Occupational History    Not the peak hours of the day, sun-protective clothing (including hat and sunglasses), sunscreen use (water resistant, broad spectrum, SPF at least 30, need for reapplication every 2 to 3 hours), avoidance of tanning beds      2. Seborrheic keratoses-monitor for change   3. History of basal cell cancer - No evidence of recurrence. Discussed risk of subsequent skin cancers and increased risk of melanoma. Reviewed importance of monitoring skin for change and sun protection with hats and sunscreen, as well as sun avoidance.

## 2020-04-06 ENCOUNTER — TELEPHONE (OUTPATIENT)
Dept: FAMILY MEDICINE CLINIC | Age: 65
End: 2020-04-06

## 2020-04-21 ENCOUNTER — VIRTUAL VISIT (OUTPATIENT)
Dept: FAMILY MEDICINE CLINIC | Age: 65
End: 2020-04-21
Payer: MEDICARE

## 2020-04-21 ENCOUNTER — TELEPHONE (OUTPATIENT)
Dept: FAMILY MEDICINE CLINIC | Age: 65
End: 2020-04-21

## 2020-04-21 VITALS — HEIGHT: 65 IN | BODY MASS INDEX: 21.99 KG/M2 | WEIGHT: 132 LBS

## 2020-04-21 PROCEDURE — G0438 PPPS, INITIAL VISIT: HCPCS | Performed by: NURSE PRACTITIONER

## 2020-04-21 RX ORDER — ZOSTER VACCINE RECOMBINANT, ADJUVANTED 50 MCG/0.5
0.5 KIT INTRAMUSCULAR SEE ADMIN INSTRUCTIONS
Qty: 0.5 ML | Refills: 0 | Status: SHIPPED | OUTPATIENT
Start: 2020-04-21 | End: 2020-10-18

## 2020-04-21 ASSESSMENT — LIFESTYLE VARIABLES
AUDIT TOTAL SCORE: 4
HOW OFTEN DURING THE LAST YEAR HAVE YOU NEEDED AN ALCOHOLIC DRINK FIRST THING IN THE MORNING TO GET YOURSELF GOING AFTER A NIGHT OF HEAVY DRINKING: 0
HOW OFTEN DURING THE LAST YEAR HAVE YOU FOUND THAT YOU WERE NOT ABLE TO STOP DRINKING ONCE YOU HAD STARTED: 0
HOW OFTEN DURING THE LAST YEAR HAVE YOU BEEN UNABLE TO REMEMBER WHAT HAPPENED THE NIGHT BEFORE BECAUSE YOU HAD BEEN DRINKING: 0
HOW OFTEN DURING THE LAST YEAR HAVE YOU FAILED TO DO WHAT WAS NORMALLY EXPECTED FROM YOU BECAUSE OF DRINKING: 0
HAS A RELATIVE, FRIEND, DOCTOR, OR ANOTHER HEALTH PROFESSIONAL EXPRESSED CONCERN ABOUT YOUR DRINKING OR SUGGESTED YOU CUT DOWN: 0
HAVE YOU OR SOMEONE ELSE BEEN INJURED AS A RESULT OF YOUR DRINKING: 0
HOW OFTEN DO YOU HAVE SIX OR MORE DRINKS ON ONE OCCASION: 0
HOW OFTEN DO YOU HAVE A DRINK CONTAINING ALCOHOL: 4
AUDIT-C TOTAL SCORE: 4
HOW MANY STANDARD DRINKS CONTAINING ALCOHOL DO YOU HAVE ON A TYPICAL DAY: 0
HOW OFTEN DURING THE LAST YEAR HAVE YOU HAD A FEELING OF GUILT OR REMORSE AFTER DRINKING: 0

## 2020-04-21 ASSESSMENT — PATIENT HEALTH QUESTIONNAIRE - PHQ9
SUM OF ALL RESPONSES TO PHQ QUESTIONS 1-9: 0
SUM OF ALL RESPONSES TO PHQ QUESTIONS 1-9: 0

## 2020-04-21 NOTE — PATIENT INSTRUCTIONS
Personalized Preventive Plan for Toñito Mendosa - 4/21/2020  Medicare offers a range of preventive health benefits. Some of the tests and screenings are paid in full while other may be subject to a deductible, co-insurance, and/or copay. Some of these benefits include a comprehensive review of your medical history including lifestyle, illnesses that may run in your family, and various assessments and screenings as appropriate. After reviewing your medical record and screening and assessments performed today your provider may have ordered immunizations, labs, imaging, and/or referrals for you. A list of these orders (if applicable) as well as your Preventive Care list are included within your After Visit Summary for your review. Other Preventive Recommendations:    · A preventive eye exam performed by an eye specialist is recommended every 1-2 years to screen for glaucoma; cataracts, macular degeneration, and other eye disorders. · A preventive dental visit is recommended every 6 months. · Try to get at least 150 minutes of exercise per week or 10,000 steps per day on a pedometer . · Order or download the FREE \"Exercise & Physical Activity: Your Everyday Guide\" from [employer]. Call 8-327.608.4961 or search [employer] online. · You need 8168-5888 mg of calcium and 8020-1646 IU of vitamin D per day. It is possible to meet your calcium requirement with diet alone, but a vitamin D supplement is usually necessary to meet this goal.  · When exposed to the sun, use a sunscreen that protects against both UVA and UVB radiation with an SPF of 30 or greater. Reapply every 2 to 3 hours or after sweating, drying off with a towel, or swimming. · Always wear a seat belt when traveling in a car. Always wear a helmet when riding a bicycle or motorcycle.

## 2020-04-21 NOTE — PROGRESS NOTES
(MULTIVITAMIN ADULT PO) Take 1 tablet by mouth daily Yes Historical Provider, MD   TRIAMCINOLONE ACETONIDE EX Apply topically every 14 days  Yes Historical Provider, MD   pimecrolimus (ELIDEL) 1 % cream Apply topically 2 times daily. Yes Bartolo Dawkins MD   aspirin (ASPIRIN CHILDRENS) 81 MG chewable tablet Take 1 tablet by mouth daily. Yes Oralia Blanco MD   diltiazem (CARDIZEM) 60 MG tablet Take 1 tablet by mouth as needed (PRN for palpitations.)  Patient not taking: Reported on 4/21/2020  Tamar Davies MD       Past Medical History:   Diagnosis Date    Atrial flutter, paroxysmal ([address] 1/14    Dr Zuñiga= post viral infection no sx 2/2015    BCC (basal cell carcinoma), face 2014    Broken arm 01/2018    right arm       Past Surgical History:   Procedure Laterality Date    COSMETIC SURGERY  2014    On nose    MOHS SURGERY  12/09/2019    SKIN CANCER EXCISION  2014    BCC left nostril       Family History   Problem Relation Age of Onset    Arrhythmia Mother 80        a fib    Cancer Father          brain tumor / Drenda Exon    High Blood Pressure Neg Hx     High Cholesterol Neg Hx        CareTeam (Including outside providers/suppliers regularly involved in providing care):   Patient Care Team:  BRANDAN An CNP as PCP - General (Family Nurse Practitioner)  BRANDAN An CNP as PCP - [facility] Empaneled Provider  Tamar Davies MD as Consulting Physician (Electrophysiology)  Junior Kaufman MD as Consulting Physician (Dermatology)  Norm Dasilva MD as Consulting Physician (Ophthalmology)    Wt Readings from Last 3 Encounters:   04/21/20 132 lb (59.9 kg)   12/04/19 133 lb 9.6 oz (60.6 kg)   09/13/18 134 lb 9.6 oz (61.1 kg)     Vitals:    04/21/20 0811   Weight: 132 lb (59.9 kg)   Height: 5' 4.5\" (1.638 m)   UNABLE TO OBTAIN B/P- HR  Body mass index is 22.31 kg/m².     Based upon direct observation of the patient, evaluation of cognition reveals recent and remote

## 2020-09-14 ENCOUNTER — OFFICE VISIT (OUTPATIENT)
Dept: DERMATOLOGY | Age: 65
End: 2020-09-14
Payer: MEDICARE

## 2020-09-14 VITALS — TEMPERATURE: 96 F

## 2020-09-14 PROCEDURE — 17003 DESTRUCT PREMALG LES 2-14: CPT | Performed by: DERMATOLOGY

## 2020-09-14 PROCEDURE — 11102 TANGNTL BX SKIN SINGLE LES: CPT | Performed by: DERMATOLOGY

## 2020-09-14 PROCEDURE — 99213 OFFICE O/P EST LOW 20 MIN: CPT | Performed by: DERMATOLOGY

## 2020-09-14 PROCEDURE — 17000 DESTRUCT PREMALG LESION: CPT | Performed by: DERMATOLOGY

## 2020-09-14 RX ORDER — PIMECROLIMUS 10 MG/G
CREAM TOPICAL
Qty: 1 BOTTLE | Refills: 4 | Status: SHIPPED | OUTPATIENT
Start: 2020-09-14 | End: 2022-09-22 | Stop reason: SDUPTHER

## 2020-09-14 NOTE — PROGRESS NOTES
[facility]  Lidia Esparza M.D.  [phone]       Aquilino Artsobia  1955    76 y.o. female     Date of Visit: 9/14/2020    Chief Complaint:   Chief Complaint   Patient presents with    Skin Lesion     refills        I was asked to see this patient by Dr. Carrington ref. provider found. History of Present Illness:  1. Total-body skin exam    Multiple nevi-stable in size, shape, color. Has not noticed any new or changing pigmented lesions. Does wear hats and sunscreen      Skin history:  7/14 basal cell carcinoma nose [facility], status post Mohs surgery and forehead flap  11/15 right upper paraspinal back dysplastic nevus, mild, saucerization  11/15 right posterior shoulder dysplastic nevus, mild, extended the biopsy margins on initial biopsy- re-saucerization  4/16 right mid paraspinal back dysplastic nevus, mild  4/16 right anterior shoulder dysplastic nevus, mild  4/16 left mid upper arm dysplastic nevus, mild  3/17 nodular basal cell carcinoma left posterior upper arm status post curettage  11/17 right upper paraspinal back dysplastic nevus with moderate dysplasia  9/18 right lower back dysplastic nevus with mild dysplasia  3/19 left mid lateral back irritated compound nevus, recurrent 9/19 9/19 basal cell carcinoma right lower eyelid status post Mohs and oculoplastics reconstruction     AK- LN2     Rosacea- Metrocream .75% 11/17-not helpful-uses Elidel 1% cream as needed 9/20    Review of Systems:  Constitutional: Reports general sense of well-being       Past Medical History, Surgical History, Family History, Medications and Allergies reviewed.     Social History:   Social History     Socioeconomic History    Marital status:      Spouse name: Not on file    Number of children: Not on file    Years of education: Not on file    Highest education level: Not on file   Occupational History    Not on file   Social Needs    Financial resource strain: Not on file   Alayan Mtz

## 2020-09-16 LAB — DERMATOLOGY PATHOLOGY REPORT: ABNORMAL

## 2020-10-29 ENCOUNTER — PROCEDURE VISIT (OUTPATIENT)
Dept: DERMATOLOGY | Age: 65
End: 2020-10-29
Payer: MEDICARE

## 2020-10-29 PROCEDURE — 17262 DSTRJ MAL LES T/A/L 1.1-2.0: CPT | Performed by: DERMATOLOGY

## 2020-10-29 NOTE — PROGRESS NOTES
[facility]ermatology  Radha Castillo M.D.  [phone]       Beatrice Sandoval  1955    76 y.o. female     Date of Visit: 10/29/2020    Chief Complaint:   Chief Complaint   Patient presents with    Skin Lesion     Curettage central chest        I was asked to see this patient by Dr. Carrington ref. provider found. History of Present Illness:  1. Patient presents today for definitive treatment of a superficial basal cell carcinoma central chest.  Healed without difficulty      Skin history:  7/14 basal cell carcinoma nose [facility], status post Mohs surgery and forehead flap  11/15 right upper paraspinal back dysplastic nevus, mild, saucerization  11/15 right posterior shoulder dysplastic nevus, mild, extended the biopsy margins on initial biopsy- re-saucerization  4/16 right mid paraspinal back dysplastic nevus, mild  4/16 right anterior shoulder dysplastic nevus, mild  4/16 left mid upper arm dysplastic nevus, mild  3/17 nodular basal cell carcinoma left posterior upper arm status post curettage  11/17 right upper paraspinal back dysplastic nevus with moderate dysplasia  9/18 right lower back dysplastic nevus with mild dysplasia  3/19 left mid lateral back irritated compound nevus, recurrent 9/19 9/19 basal cell carcinoma right lower eyelid status post Mohs and oculoplastics reconstruction     AK- LN2     Rosacea- Metrocream .75% 11/17-not helpful-uses Elidel 1% cream as needed 9/20    Review of Systems:  Constitutional: Reports general sense of well-being       Past Medical History, Surgical History, Family History, Medications and Allergies reviewed.     Social History:   Social History     Socioeconomic History    Marital status:      Spouse name: Not on file    Number of children: Not on file    Years of education: Not on file    Highest education level: Not on file   Occupational History    Not on file   Social Needs    Financial resource strain: Not on file   [place] insecurity     Worry: Not on file     Inability: Not on file    Transportation needs     Medical: Not on file     Non-medical: Not on file   Tobacco Use    Smoking status: Never Smoker    Smokeless tobacco: Never Used   Substance and Sexual Activity    Alcohol use: Yes     Comment: occas    Drug use: No    Sexual activity: Not on file   Lifestyle    Physical activity     Days per week: Not on file     Minutes per session: Not on file    Stress: Not on file   Relationships    Social connections     Talks on phone: Not on file     Gets together: Not on file     Attends Rastafarian service: Not on file     Active member of club or organization: Not on file     Attends meetings of clubs or organizations: Not on file     Relationship status: Not on file    Intimate partner violence     Fear of current or ex partner: Not on file     Emotionally abused: Not on file     Physically abused: Not on file     Forced sexual activity: Not on file   Other Topics Concern    Not on file   Social History Narrative    Not on file       Physical Examination       -General: Well-appearing, NAD  1. Well-healed scar central chest with residual thin pink papule-biopsy-proven superficial basal cell carcinoma      Assessment and Plan     1. Basal cell carcinoma (BCC) of anterior chest-discussed excision with plastic surgery, Mohs, curettage-proceed with curettage-Curettage performed after informed written consent obtained following explanation of risks, benefits, alternatives  -Local anesthesia acheived with 1% lidocaine with epinephrine/sodium bicarbonate. Sharp curettage performed in 3 different directions. First pass size 11mm.  Hemostasis obtained with aluminum chloride.   -Edu re: bleeding, discomfort, infection, scar  -Edu re: wound care, risk of recurrence    Discussed risk of keloid with any procedure-recheck at next total-body skin exam

## 2021-02-16 ENCOUNTER — APPOINTMENT (OUTPATIENT)
Dept: GENERAL RADIOLOGY | Age: 66
End: 2021-02-16
Payer: MEDICARE

## 2021-02-16 ENCOUNTER — HOSPITAL ENCOUNTER (EMERGENCY)
Age: 66
Discharge: HOME | End: 2021-02-16
Attending: EMERGENCY MEDICINE
Payer: MEDICARE

## 2021-02-16 VITALS
HEART RATE: 89 BPM | TEMPERATURE: 97.5 F | HEIGHT: 64 IN | WEIGHT: 138.89 LBS | RESPIRATION RATE: 24 BRPM | DIASTOLIC BLOOD PRESSURE: 68 MMHG | OXYGEN SATURATION: 99 % | BODY MASS INDEX: 23.71 KG/M2 | SYSTOLIC BLOOD PRESSURE: 126 MMHG

## 2021-02-16 DIAGNOSIS — R00.2 PALPITATIONS: Primary | ICD-10-CM

## 2021-02-16 DIAGNOSIS — R55 NEAR SYNCOPE: ICD-10-CM

## 2021-02-16 LAB
A/G RATIO: 1.5 (ref 1.1–2.2)
ALBUMIN SERPL-MCNC: 4.2 G/DL (ref 3.4–5)
ALP BLD-CCNC: 77 U/L (ref 40–129)
ALT SERPL-CCNC: 17 U/L (ref 10–40)
ANION GAP SERPL CALCULATED.3IONS-SCNC: 10 MMOL/L (ref 3–16)
AST SERPL-CCNC: 22 U/L (ref 15–37)
BASOPHILS ABSOLUTE: 0 K/UL (ref 0–0.2)
BASOPHILS RELATIVE PERCENT: 0.5 %
BILIRUB SERPL-MCNC: 0.8 MG/DL (ref 0–1)
BUN BLDV-MCNC: 11 MG/DL (ref 7–20)
CALCIUM SERPL-MCNC: 9.5 MG/DL (ref 8.3–10.6)
CHLORIDE BLD-SCNC: 101 MMOL/L (ref 99–110)
CO2: 29 MMOL/L (ref 21–32)
CREAT SERPL-MCNC: 0.7 MG/DL (ref 0.6–1.2)
EOSINOPHILS ABSOLUTE: 0.1 K/UL (ref 0–0.6)
EOSINOPHILS RELATIVE PERCENT: 2.2 %
GFR AFRICAN AMERICAN: >60
GFR NON-AFRICAN AMERICAN: >60
GLOBULIN: 2.8 G/DL
GLUCOSE BLD-MCNC: 124 MG/DL (ref 70–99)
HCT VFR BLD CALC: 41 % (ref 36–48)
HEMOGLOBIN: 13.8 G/DL (ref 12–16)
LYMPHOCYTES ABSOLUTE: 1.5 K/UL (ref 1–5.1)
LYMPHOCYTES RELATIVE PERCENT: 23.4 %
MCH RBC QN AUTO: 30.2 PG (ref 26–34)
MCHC RBC AUTO-ENTMCNC: 33.7 G/DL (ref 31–36)
MCV RBC AUTO: 89.7 FL (ref 80–100)
MONOCYTES ABSOLUTE: 0.5 K/UL (ref 0–1.3)
MONOCYTES RELATIVE PERCENT: 8.5 %
NEUTROPHILS ABSOLUTE: 4.2 K/UL (ref 1.7–7.7)
NEUTROPHILS RELATIVE PERCENT: 65.4 %
PDW BLD-RTO: 12.7 % (ref 12.4–15.4)
PLATELET # BLD: 281 K/UL (ref 135–450)
PMV BLD AUTO: 8.7 FL (ref 5–10.5)
POTASSIUM REFLEX MAGNESIUM: 3.7 MMOL/L (ref 3.5–5.1)
PRO-BNP: 377 PG/ML (ref 0–124)
RBC # BLD: 4.57 M/UL (ref 4–5.2)
SODIUM BLD-SCNC: 140 MMOL/L (ref 136–145)
TOTAL PROTEIN: 7 G/DL (ref 6.4–8.2)
TROPONIN: <0.01 NG/ML
TROPONIN: <0.01 NG/ML
WBC # BLD: 6.5 K/UL (ref 4–11)

## 2021-02-16 PROCEDURE — 85025 COMPLETE CBC W/AUTO DIFF WBC: CPT

## 2021-02-16 PROCEDURE — 36415 COLL VENOUS BLD VENIPUNCTURE: CPT

## 2021-02-16 PROCEDURE — 2580000003 HC RX 258: Performed by: EMERGENCY MEDICINE

## 2021-02-16 PROCEDURE — 93005 ELECTROCARDIOGRAM TRACING: CPT | Performed by: EMERGENCY MEDICINE

## 2021-02-16 PROCEDURE — 99285 EMERGENCY DEPT VISIT HI MDM: CPT

## 2021-02-16 PROCEDURE — 80053 COMPREHEN METABOLIC PANEL: CPT

## 2021-02-16 PROCEDURE — 71046 X-RAY EXAM CHEST 2 VIEWS: CPT

## 2021-02-16 PROCEDURE — 83880 ASSAY OF NATRIURETIC PEPTIDE: CPT

## 2021-02-16 PROCEDURE — 84484 ASSAY OF TROPONIN QUANT: CPT

## 2021-02-16 RX ORDER — 0.9 % SODIUM CHLORIDE 0.9 %
1000 INTRAVENOUS SOLUTION INTRAVENOUS ONCE
Status: COMPLETED | OUTPATIENT
Start: 2021-02-16 | End: 2021-02-16

## 2021-02-16 RX ADMIN — SODIUM CHLORIDE 1000 ML: 9 INJECTION, SOLUTION INTRAVENOUS at 18:00

## 2021-02-16 NOTE — ED NOTES
Patient arrived via EMS. C/o near syncope episode. Patient reports she was doing dishes and she began to feel lightheaded with palpitations. She did not fall, no LOC, no chest pain, and no SOB. She reports she has a Hx Aflutter, and takes Cardizem PRN. Attached to monitor, NSR 7. EKG done at bedside. Dr. Mikayla West at bedside. Not currently feeling lightheaded. Alert and oriented. No respiratory distress.  Will continue to monitor     Ulviolette Esquivel RN  02/16/21 2366

## 2021-02-16 NOTE — ED PROVIDER NOTES
swollen glands. Musculoskeletal: No back pain. No joint pain. Integumentary: No rash. No abrasions. Neurologic: No headache. No focal numbness or weakness. PAST MEDICAL HISTORY     Past Medical History:   Diagnosis Date    Atrial flutter, paroxysmal ([address] 1/14    Dr Zuñiga= post viral infection no sx 2/2015    BCC (basal cell carcinoma), face 2014    Broken arm 01/2018    right arm         SURGICALHISTORY       Past Surgical History:   Procedure Laterality Date    COSMETIC SURGERY  2014    On nose    MOHS SURGERY  12/09/2019    SKIN CANCER EXCISION  2014    [facility] left nostril         CURRENT MEDICATIONS       Discharge Medication List as of 2/16/2021  9:20 PM      CONTINUE these medications which have NOT CHANGED    Details   pimecrolimus (ELIDEL) 1 % cream Apply topically 2 times daily. , Disp-1 Bottle,R-4, Normal      Multiple Vitamins-Minerals (MULTIVITAMIN ADULT PO) Take 1 tablet by mouth dailyHistorical Med      TRIAMCINOLONE ACETONIDE EX Apply topically every 14 days Historical Med      aspirin (ASPIRIN CHILDRENS) 81 MG chewable tablet Take 1 tablet by mouth daily. , Disp-30 tablet, R-0             ALLERGIES     Patient has no known allergies.     FAMILY HISTORY       Family History   Problem Relation Age of Onset    Arrhythmia Mother 80        a fib    Cancer Father          brain tumor / glioblasotma    High Blood Pressure Neg Hx     High Cholesterol Neg Hx           SOCIAL HISTORY       Social History     Socioeconomic History    Marital status:      Spouse name: None    Number of children: None    Years of education: None    Highest education level: None   Occupational History    None   Social Needs    Financial resource strain: None    Food insecurity     Worry: None     Inability: None    Transportation needs     Medical: None     Non-medical: None   Tobacco Use    Smoking status: Never Smoker    Smokeless tobacco: Never Used   Substance and Sexual Activity    Alcohol use: Yes     Comment: occas    Drug use: No    Sexual activity: None   Lifestyle    Physical activity     Days per week: None     Minutes per session: None    Stress: None   Relationships    Social connections     Talks on phone: None     Gets together: None     Attends [employer] service: None     Active member of club or organization: None     Attends meetings of clubs or organizations: None     Relationship status: None    Intimate partner violence     Fear of current or ex partner: None     Emotionally abused: None     Physically abused: None     Forced sexual activity: None   Other Topics Concern    None   Social History Narrative    None       SCREENINGS    [place] Coma Scale  Eye Opening: Spontaneous  Best Verbal Response: Oriented  Best Motor Response: Obeys commands  [place] Coma Scale Score: 15        PHYSICAL EXAM    (up to 7 for level 4, 8 or more for level 5)     ED Triage Vitals [02/16/21 1703]   BP Temp Temp Source Pulse Resp SpO2 Height Weight   124/73 97.5 °F (36.4 °C) Oral 78 14 100 % 5' 4\" (1.626 m) 138 lb 14.2 oz (63 kg)       General: Alert and oriented, No acute distress. Eye: Normal conjunctiva. Pupils equal and reactive. HENT: Oral mucosa is moist.  Respiratory: Lungs are clear to auscultation, Respirations are non-labored. Cardiovascular: Normal rate, Regular rhythm. Gastrointestinal: Soft, Non-tender, Non-distended. Musculoskeletal: No swelling. Integumentary: Warm, Dry. Neurologic: Alert, Oriented, No focal defects. Psychiatric: Cooperative.     DIAGNOSTIC RESULTS     EKG: All EKG's are interpreted by the Emergency Department Physician who either signs or Co-signsthis chart in the absence of a cardiologist.    Per my interpretation:    Electrocardiogram (ECG) 2/16/2021 1738  RATE: normal  RHYTHM: normal sinus  AXIS: normal  INTERVALS: normal  ST-T WAVE CHANGES: No evidence of ST segment elevation or T-wave inversion, nonspecific ST abnormality including slight depression in TempSrc:       SpO2: 100% 100% 100% 99%   Weight:       Height:             Medical decision making: This is a 62-year-old female who presents for evaluation after an episode of lightheadedness today associated with diaphoresis and heart palpitations. Patient has a history of intermittent atrial flutter but has not had these near syncopal episodes related to the palpitations in the past.  On exam she is well-appearing, afebrile, with normal vital signs. She has a normal neurologic exam, and is noted to be sinus rhythm on the monitor. In terms of near syncopal event, consider cardiac arrhythmia versus ischemia, anemia, dehydration, metabolic disturbance. CBC, CMP are within normal limits. Initial troponin is negative. EKG shows sinus rhythm without ischemic changes. BNP is minimally elevated at 377 however chest x-ray shows no acute disease. Patient was observed in the emergency department and had no recurrent symptoms, she remained in sinus rhythm on the monitor. We did obtain a delta troponin to evaluate for possible heart strain related to the event today and this remained normal.  Recommended that the patient follow-up with cardiology. She states that she is seeing a Dr. Lesia Lea in the past, but may prefer to be seen at this hospital is more convenient for her. She is provided a referral for Dr. Shraddha Cali cardiology. Recommended that she call tomorrow to obtain an expedited appointment. Also recommended close follow-up with her primary care provider and return to the emergency department if she develops any new or recurrent symptoms. Patient is understanding, agreeable plan of care, discharged in stable condition. CRITICAL CARE TIME   Total Critical Care time was 0 minutes, excluding separately reportable procedures. There was a high probability of clinically significant/life threatening deterioration in the patient's condition which required my urgent intervention. CONSULTS:  None    PROCEDURES:  Unless otherwise noted below, none         FINAL IMPRESSION      1. Palpitations    2.  Near syncope          DISPOSITION/PLAN   DISPOSITION Decision To Discharge 02/16/2021 09:14:21 PM      PATIENT REFERRED TO:  BRANDAN Ferreira - CNP  [id number]  [phone]    Schedule an appointment as soon as possible for a visit in 2 days        DISCHARGE MEDICATIONS:  Discharge Medication List as of 2/16/2021  9:20 PM             (Please note that portions of this note were completed with a voice recognition program.Efforts were made to edit the dictations but occasionally words are mis-transcribed.)    Javier Holley MD (electronically signed)  Attending Emergency Physician        Javier Holley MD  02/16/21 8786

## 2021-02-17 ENCOUNTER — TELEPHONE (OUTPATIENT)
Dept: CARDIOLOGY CLINIC | Age: 66
End: 2021-02-17

## 2021-02-17 NOTE — ED NOTES
MD present to discuss testing results and discharge plan.       Arnaud Torrez RN  02/16/21 3500 PROVIDER:[TOKEN:[[id number]],FOLLOWUP:[1-3 days]],PROVIDER:[TOKEN:[[id number]]],PROVIDER:[TOKEN:[[id number]],FOLLOWUP:[2 weeks]]

## 2021-02-17 NOTE — TELEPHONE ENCOUNTER
Pt calling. 2/16 pt almost passed out and ended up at the ER. Pt was told she should follow up with [facility] as soon as possible. Pt is now scheduled for 3/1. Pt states that she was standing decorating a cake and felt like she was going to pass out and sweating. Pt said she was experincing palpitations. EKG was normal, Blood work was normal. Pt said she never felt like this before and believes she may have fainted for a min or two. Should pt be seen sooner? Please call pt to advise.

## 2021-02-18 LAB
EKG ATRIAL RATE: 73 BPM
EKG DIAGNOSIS: NORMAL
EKG P AXIS: 61 DEGREES
EKG P-R INTERVAL: 140 MS
EKG Q-T INTERVAL: 412 MS
EKG QRS DURATION: 74 MS
EKG QTC CALCULATION (BAZETT): 453 MS
EKG R AXIS: 67 DEGREES
EKG T AXIS: 60 DEGREES
EKG VENTRICULAR RATE: 73 BPM

## 2021-02-18 PROCEDURE — 93010 ELECTROCARDIOGRAM REPORT: CPT | Performed by: INTERNAL MEDICINE

## 2021-02-24 ENCOUNTER — VIRTUAL VISIT (OUTPATIENT)
Dept: FAMILY MEDICINE CLINIC | Age: 66
End: 2021-02-24
Payer: MEDICARE

## 2021-02-24 DIAGNOSIS — R55 NEAR SYNCOPE: Primary | ICD-10-CM

## 2021-02-24 DIAGNOSIS — Z12.11 COLON CANCER SCREENING: ICD-10-CM

## 2021-02-24 PROCEDURE — 99213 OFFICE O/P EST LOW 20 MIN: CPT | Performed by: NURSE PRACTITIONER

## 2021-02-24 NOTE — PROGRESS NOTES
Ramsey Henriquez (:  1955) is a 76 y.o. female,Established patient, here for evaluation of the following chief complaint(s): Other (ER FOLLOW UP, WAS STANDING FOR 15 MIN AND STARTED TO FEEL LIKE SHE WOULD PASS OUT, SHE SAT DOWN, HER  CAME IN AND HER EYES ROLLED BACK IN HER HEAD  CALLED 911 )    Jimmie Sicard was seen today for other. Diagnoses and all orders for this visit:    Near syncope  ER NOTES- LABS - EKG REVIEWED  WHICH WERE ALL NORMAL  VAGAL RESPONSE DW PT  ADVISED TO MAKE SURE SHE IS IN A SAFE PLACE IF THIS HAPPENS AGAIN AS SHE KNOWS HOW THIS FEELS  I INFORMED PT THAT IT MAY NEVER HAPPEN AGAIN OR IT MAY HAPPEN OFTEN  SHE HAS AN APPOINTMENT WITH CARDIOLOGY 3/1  Colon cancer screening  -     [facility] (For External Results Only); Future        WILL SCHEDULE FOR COVID VACCINE  PNEUMOCOCCAL AND FLU VACCINE DECLINED    SUBJECTIVE/OBJECTIVE:  HPI  ER FOLLOW UP PT WAS BAKING A CAKE FELT  DIZZY AND SWEATY OUT OF THE BLUE  SHE FELT LIKE SHE WAS GOING TO PASS OUT PUT HEAD ON TABLE  SHE THEN FELT HER HEART RACING STOMACH FELT QUEASY  THIS LASTED FOR THREE MINUTES HER  SAID HER EYES WERE ROLLED TO THE BACK OF HER- HE CALLED THE [facility] BY THE TIME THEY CAME TO HER HOUSE - EKG PAST WAS ON TREADMILL ONE HOUR BEFOREHER HEART POUNDING  Review of Systems   Cardiovascular: Negative.         Patient-Reported Vitals 2021   Patient-Reported Weight 132   Patient-Reported Height 5'4 \"   Patient-Reported Systolic 144   Patient-Reported Diastolic 72   Patient-Reported Pulse 74   Patient-Reported Temperature 97.7        Physical Exam    [INSTRUCTIONS:  \"[x]\" Indicates a positive item  \"[]\" Indicates a negative item  -- DELETE ALL ITEMS NOT EXAMINED]    Constitutional: [x] Appears well-developed and well-nourished [x] No apparent distress      [] Abnormal -     Mental status: [x] Alert and awake  [x] Oriented to person/place/time [x] Able to follow commands    [] Abnormal -     Eyes:   EOM [x]  Normal    [] Abnormal -   Sclera  [x]  Normal    [] Abnormal -          Discharge [x]  None visible   [] Abnormal -     HENT: [x] Normocephalic, atraumatic  [] Abnormal -   [x] Mouth/Throat: Mucous membranes are moist    External Ears [x] Normal  [] Abnormal -    Neck: [x] No visualized mass [] Abnormal -     Pulmonary/Chest: [x] Respiratory effort normal   [x] No visualized signs of difficulty breathing or respiratory distress        [] Abnormal -      Musculoskeletal:   [x] Normal gait with no signs of ataxia         [x] Normal range of motion of neck        [] Abnormal -     Neurological:        [x] No Facial Asymmetry (Cranial nerve 7 motor function) (limited exam due to video visit)          [x] No gaze palsy        [] Abnormal -          Skin:        [x] No significant exanthematous lesions or discoloration noted on facial skin         [] Abnormal -            Psychiatric:       [x] Normal Affect [] Abnormal -        [x] No Hallucinations    Other pertinent observable physical exam findings:-                  Mohini Sahu is a 76 y.o. female being evaluated by a Virtual Visit (video visit) encounter to address concerns as mentioned above. A caregiver was present when appropriate. Due to this being a TeleHealth encounter (During XZBJG-13 public health emergency), evaluation of the following organ systems was limited: Vitals/Constitutional/EENT/Resp/CV/GI//MS/Neuro/Skin/Heme-Lymph-Imm. Pursuant to the emergency declaration under the [address] [place] [address] and the [employer] Act, this Virtual Visit was conducted with patient's (and/or legal guardian's) consent, to reduce the patient's risk of exposure to COVID-19 and provide necessary medical care.   The patient (and/or legal guardian) has also been advised to contact this office for worsening conditions or problems, and seek emergency medical treatment and/or call 911 if deemed necessary. Patient identification was verified at the start of the visit: Yes    Services were provided through a video synchronous discussion virtually to substitute for in-person clinic visit. Patient was located at home and provider was located in office or at home. An electronic signature was used to authenticate this note.     --Kayode Solano, BRANDAN - CNP

## 2021-03-01 ENCOUNTER — TELEPHONE (OUTPATIENT)
Dept: CARDIOLOGY CLINIC | Age: 66
End: 2021-03-01

## 2021-03-01 ENCOUNTER — OFFICE VISIT (OUTPATIENT)
Dept: CARDIOLOGY CLINIC | Age: 66
End: 2021-03-01
Payer: MEDICARE

## 2021-03-01 VITALS
HEIGHT: 64 IN | SYSTOLIC BLOOD PRESSURE: 100 MMHG | DIASTOLIC BLOOD PRESSURE: 60 MMHG | WEIGHT: 133 LBS | HEART RATE: 77 BPM | BODY MASS INDEX: 22.71 KG/M2 | OXYGEN SATURATION: 99 %

## 2021-03-01 DIAGNOSIS — I48.92 ATRIAL FLUTTER, PAROXYSMAL (HCC): Primary | Chronic | ICD-10-CM

## 2021-03-01 DIAGNOSIS — I34.1 MVP (MITRAL VALVE PROLAPSE): ICD-10-CM

## 2021-03-01 PROCEDURE — 93000 ELECTROCARDIOGRAM COMPLETE: CPT | Performed by: INTERNAL MEDICINE

## 2021-03-01 PROCEDURE — 99214 OFFICE O/P EST MOD 30 MIN: CPT | Performed by: INTERNAL MEDICINE

## 2021-03-01 RX ORDER — DILTIAZEM HYDROCHLORIDE 60 MG/1
60 TABLET, FILM COATED ORAL PRN
Qty: 30 TABLET | Refills: 3 | Status: SHIPPED
Start: 2021-03-01 | End: 2021-04-06 | Stop reason: DRUGHIGH

## 2021-03-01 NOTE — PATIENT INSTRUCTIONS
Plan:   1. 30 day event monitor to assess palpitations. 2. Continue current medications as prescribed. 3. Follow up with EP NP in 3 months.

## 2021-03-01 NOTE — PROGRESS NOTES
[address]   Electrophysiology Follow up   Date: 3/1/21  Patient Name: Matt Goodrich  YOB: 1955    Primary Care Physician: BRANDAN Mckay CNP    Chief Complaint   Patient presents with   [address] from . [address] visit 2/16/2021 [facility]    Atrial Flutter     HPI: Matt Goodrich is a 76 y.o. female being followed for AFL. She was admitted to [facility] 2/13/14 with AFL. She initially presented to the Urgent Care facility for treatment of an infection, and an ECG demonstrated what appeared to be typical AFL with variable AV conduction. She was then directed to the ED at [facility] where ECG demonstrated typical appearing JOSÉ with V rate of 154 bpm.  The total arrhythmia lasted about 5-7 hours. Event recorder (2/7/15 to 3/8/15) showed several brief episodes of AFL. She had two episodes of feeling palpitations, 7/22/15 and 8/1/15, each lasting 2 hours. She took her prn Diltiazem and the episode resolved. She was also in Atrial flutter/fib at office visit 7/5/16. At her office visit on 9/2018, she complained of sporatic palpitations happening several times a week, lasting about 5 min. She checks her pulse and it feels rapid and slightly irregular. No other associated symptoms such as lightheadedness, dizziness, CP, or SOB. Cannot correlate any precipitating factors. Episodes have terminated on their own without diltiazem as they are short in nature. She walks 2-3 miles per day with good exercise tolerance. On 2/16/2021, patient presented to the ED with complaints of palpitations and near syncopal episode. 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 She stated that she sat down and started to improve however she had significant mount of sweating. She also reported some nausea. Therefore, her  called EMS at [phone].  She stated that she did have palpitations today prior to this episode occurring. She did not actually lose consciousness. She denied any associated chest pain or difficulty breathing during this episode. EMS arrived and the first blood pressure obtained 8 minutes after receiving the call was 761 mmHg systolic with a heart rate of 88 bpm..  By the time she was transported to the ED ,all symptoms had resolved. SR on ECG in route to ED and in ED. She reports that between the ages of 10-10 years old, she had 2 near syncopal episodes at [employer] while kneeling. She reported lightheadedness and diaphoresis with the near syncopal episodes when she was a child. She reports she has occasional palpitations several times per day, several times per week. The palpitations vary in length but last anywhere from 5-15 minutes at a time. She reports she is taking all medications as prescribed and tolerates them well. Patient denies current edema, chest pain, or sob. Past Medical History:   Diagnosis Date    Atrial flutter, paroxysmal ([address] 1/14    Dr Zuñiga= post viral infection no sx 2/2015    BCC (basal cell carcinoma), face 2014    Broken arm 01/2018    right arm        Past Surgical History:   Procedure Laterality Date    COSMETIC SURGERY  2014    On nose    MOHS SURGERY  12/09/2019    SKIN CANCER EXCISION  2014    BCC left nostril       Allergies:  No Known Allergies    Medication:   Current Outpatient Medications   Medication Sig Dispense Refill    pimecrolimus (ELIDEL) 1 % cream Apply topically 2 times daily. 1 Bottle 4    Multiple Vitamins-Minerals (MULTIVITAMIN ADULT PO) Take 1 tablet by mouth daily      TRIAMCINOLONE ACETONIDE EX Apply topically every 14 days       aspirin (ASPIRIN CHILDRENS) 81 MG chewable tablet Take 1 tablet by mouth daily. 30 tablet 0     No current facility-administered medications for this visit. Social History:   reports that she has never smoked. She has never used smokeless tobacco. She reports current alcohol use.  She reports that she does not use drugs. Family History:  family history includes Arrhythmia (age of onset: 80) in her mother; Cancer in her father. Reviewed. Denies family history of sudden cardiac death, arrhythmia, premature CAD    Review of System:  · General: negative for fever, chills   · Ophthalmic ROS: negative for - eye pain or loss of vision  · ENT ROS: negative for - headaches, sore throat   · Respiratory: negative for - cough, sputum  · Cardiovascular: Reviewed in HPI  · Gastrointestinal: negative for - abdominal pain, diarrhea, N/V  · Hematology: negative for - bleeding, blood clots, bruising or jaundice  · Genito-Urinary:  negative for - Dysuria or incontinence  · Musculoskeletal: negative for - Joint swelling, muscle pain  · Neurological: negative for - confusion, dizziness, headaches   · Psychiatric: No anxiety, no depression. · Dermatological: negative for - rash    Physical Examination:  /60   Pulse 77   Ht 5' 4\" (1.626 m)   Wt 133 lb (60.3 kg)   SpO2 99%   BMI 22.83 kg/m²     · Constitutional: Oriented. No distress. · Head: Normocephalic and atraumatic. · Mouth/Throat: Oropharynx is clear and moist.   · Eyes: Conjunctivae normal. EOM are normal.   · Neck: Normal range of motion. Neck supple. No rigidity. No JVD present. Cardiovascular: Normal rate, regular rhythm, S1&S2 and intact distal pulses. + LSC (multicomponent), no murmur   · Pulmonary/Chest: Bilateral respiratory sounds. No wheezes. No rhonchi. · Abdominal: Soft. Bowel sounds present. No distension, No tenderness. · Musculoskeletal: No tenderness. No edema    · Lymphadenopathy: Has no cervical adenopathy. · Neurological: Alert and oriented. Cranial nerve appears intact, No Gross deficit   · Skin: Skin is warm and dry. No rash noted. · Psychiatric: Has a normal mood, affect and behavior     Labs:  Reviewed.      Data:    ECG 3/1/2021: SR, occasional PACs, 75 BPM.     Echo 2/2014  Normal left ventricle size, wall thickness and systolic function with an estimated ejection fraction of 60%. Mild mitral regurgitation is present. There is mild tricuspid regurgitation with RVSP estimated at 31 mmHg. Assessment:   1. Paroxysmal atrial flutter--   First episode of Atrial flutter documented in February 2014. Feb/March 2015 event recorder showed several episodes of Atrial flutter for which she took Diltiazem  Two episodes 7/22/15 and 8/1/15 lasting 2 hours each followed by 3 months palpitation-free  Was in Atrial flutter/fib at [facility]/5[facility]16. Discussed options at that visit including anti-arrhythmic and RFCA, but she wanted to continue treatment of episodes with Diltiazem prn  Took prn Diltiazem n January 2017 after palpitations persisted for 30 minutes and resolved 60-90 minutes after med. Has brief occasional palpitations now. 2. MVP/Mitral regurgitation- On auscultation she has a late systolic click. Echo 2/14/14 showed mild MR although MVP was not indicated in report. 3.  Near syncope-she had an episode of near syncope on 2/16/2021 which was preceded by a prodrome which included lightheadedness,  nausea and diaphoresis. This was reminiscent to her of previous episode she had had as a child while in [place]. The nature of the prodrome and the progression of symptoms is suggestive of a vasodepressor event. We discussed avoidance behaviors such as avoiding dehydration, and avoiding prolonged standing. We also discussed promptly assuming the supine position when she recognizes this prodrome again. Plan:   1. 30 day event monitor to assess palpitations. 2. Continue current medications as prescribed. 3. Follow up with EP NP in 3 months.      This note has been scribed in the presence of Edna Turk MD by Essie Colvin, DARVIN.       I, Dr. Edna Turk, personally performed the services described in this documentation as scribed by Essie Colvin RN in my presence, and it is both accurate and complete.         Steve Hooks MD

## 2021-03-01 NOTE — LETTER
[facility]   Electrophysiology Follow up   Date: 3/1/21  Patient Name: Mohini Sahu  YOB: 1955    Primary Care Physician: BRANDAN Marks CNP    Chief Complaint   Patient presents with   [address] visit 2/16/2021 [facility]    Atrial Flutter     HPI: Mohini Sahu is a 76 y.o. female being followed for AFL. She was admitted to [facility] 2/13/14 with AFL. She initially presented to the Urgent Care facility for treatment of an infection, and an ECG demonstrated what appeared to be typical AFL with variable AV conduction. She was then directed to the ED at [facility] where ECG demonstrated typical appearing custodial with V rate of 154 bpm.  The total arrhythmia lasted about 5-7 hours. Event recorder (2/7/15 to 3/8/15) showed several brief episodes of AFL. She had two episodes of feeling palpitations, 7/22/15 and 8/1/15, each lasting 2 hours. She took her prn Diltiazem and the episode resolved. She was also in Atrial flutter/fib at office visit 7/5/16. At her office visit on 9/2018, she complained of sporatic palpitations happening several times a week, lasting about 5 min. She checks her pulse and it feels rapid and slightly irregular. No other associated symptoms such as lightheadedness, dizziness, CP, or SOB. Cannot correlate any precipitating factors. Episodes have terminated on their own without diltiazem as they are short in nature. She walks 2-3 miles per day with good exercise tolerance. On 2/16/2021, patient presented to the ED with complaints of palpitations and near syncopal episode. 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 She stated that she sat down and started to improve however she had significant mount of sweating. She also reported some nausea. Therefore, her  called EMS at [phone]. She stated that she did have palpitations today prior to this episode occurring. She did not actually lose consciousness. She denied any associated chest pain or difficulty breathing during this episode. EMS arrived and the first blood pressure obtained 8 minutes after receiving the call was 430 mmHg systolic with a heart rate of 88 bpm..  By the time she was transported to the ED ,all symptoms had resolved. SR on ECG in route to ED and in ED. She reports that between the ages of 10-10 years old, she had 2 near syncopal episodes at [employer] while kneeling. She reported lightheadedness and diaphoresis with the near syncopal episodes when she was a child. She reports she has occasional palpitations several times per day, several times per week. The palpitations vary in length but last anywhere from 5-15 minutes at a time. She reports she is taking all medications as prescribed and tolerates them well. Patient denies current edema, chest pain, or sob. Past Medical History:   Diagnosis Date    Atrial flutter, paroxysmal ([address] 1/14    Dr Zuñiga= post viral infection no sx 2/2015    BCC (basal cell carcinoma), face 2014    Broken arm 01/2018    right arm        Past Surgical History:   Procedure Laterality Date    COSMETIC SURGERY  2014    On nose    MOHS SURGERY  12/09/2019    SKIN CANCER EXCISION  2014    BCC left nostril       Allergies:  No Known Allergies    Medication:   Current Outpatient Medications   Medication Sig Dispense Refill    pimecrolimus (ELIDEL) 1 % cream Apply topically 2 times daily.  1 Bottle 4 · Abdominal: Soft. Bowel sounds present. No distension, No tenderness. · Musculoskeletal: No tenderness. No edema    · Lymphadenopathy: Has no cervical adenopathy. · Neurological: Alert and oriented. Cranial nerve appears intact, No Gross deficit   · Skin: Skin is warm and dry. No rash noted. · Psychiatric: Has a normal mood, affect and behavior     Labs:  Reviewed. Data:    ECG 3/1/2021: SR, occasional PACs, 75 BPM.     Echo 2/2014  Normal left ventricle size, wall thickness and systolic function with an estimated ejection fraction of 60%. Mild mitral regurgitation is present. There is mild tricuspid regurgitation with RVSP estimated at 31 mmHg. Assessment:   1. Paroxysmal atrial flutter--   First episode of Atrial flutter documented in February 2014. Feb/March 2015 event recorder showed several episodes of Atrial flutter for which she took Diltiazem  Two episodes 7/22/15 and 8/1/15 lasting 2 hours each followed by 3 months palpitation-free  Was in Atrial flutter/fib at [address] 7/5/16. Discussed options at that visit including anti-arrhythmic and RFCA, but she wanted to continue treatment of episodes with Diltiazem prn  Took prn Diltiazem n January 2017 after palpitations persisted for 30 minutes and resolved 60-90 minutes after med. Has brief occasional palpitations now. 2. MVP/Mitral regurgitation- On auscultation she has a late systolic click. Echo 2/14/14 showed mild MR although MVP was not indicated in report. 3.  Near syncope-she had an episode of near syncope on 2/16/2021 which was preceded by a prodrome which included lightheadedness,  nausea and diaphoresis. This was reminiscent to her of previous episode she had had as a child while in [employer]. The nature of the prodrome and the progression of symptoms is suggestive of a vasodepressor event. We discussed avoidance behaviors such as avoiding dehydration, and avoiding prolonged standing. We also discussed promptly assuming the supine position when she recognizes this prodrome again. Plan:   1. 30 day event monitor to assess palpitations. 2. Continue current medications as prescribed. 3. Follow up with EP NP in 3 months. This note has been scribed in the presence of Zoe Luna MD by Adeola Nascimento RN.       I, Dr. Zoe Luna, personally performed the services described in this documentation as scribed by Adeola Nascimento RN in my presence, and it is both accurate and complete.         Zoe Luna MD

## 2021-03-02 ENCOUNTER — IMMUNIZATION (OUTPATIENT)
Dept: PRIMARY CARE CLINIC | Age: 66
End: 2021-03-02
Payer: MEDICARE

## 2021-03-02 PROCEDURE — 0001A COVID-19, PFIZER VACCINE 30MCG/0.3ML DOSE: CPT | Performed by: FAMILY MEDICINE

## 2021-03-02 PROCEDURE — 91300 COVID-19, PFIZER VACCINE 30MCG/0.3ML DOSE: CPT | Performed by: FAMILY MEDICINE

## 2021-03-08 ENCOUNTER — OFFICE VISIT (OUTPATIENT)
Dept: DERMATOLOGY | Age: 66
End: 2021-03-08
Payer: MEDICARE

## 2021-03-08 VITALS — TEMPERATURE: 97 F

## 2021-03-08 DIAGNOSIS — D22.9 BENIGN NEVUS: Primary | ICD-10-CM

## 2021-03-08 DIAGNOSIS — Z85.828 HISTORY OF BASAL CELL CANCER: ICD-10-CM

## 2021-03-08 DIAGNOSIS — L82.1 SEBORRHEIC KERATOSES: ICD-10-CM

## 2021-03-08 PROCEDURE — 99213 OFFICE O/P EST LOW 20 MIN: CPT | Performed by: DERMATOLOGY

## 2021-03-08 NOTE — PROGRESS NOTES
moles or concerning lesions  -Reviewed sun protective behavior -- sun avoidance during the peak hours of the day, sun-protective clothing (including hat and sunglasses), sunscreen use (water resistant, broad spectrum, SPF at least 30, need for reapplication every 2 to 3 hours), avoidance of tanning beds      2. Seborrheic keratoses-monitor for change. Treatment of symptomatic lesions as needed   3. History of basal cell cancer - No evidence of recurrence. Discussed risk of subsequent skin cancers and increased risk of melanoma. Reviewed importance of monitoring skin for change and sun protection with hats and sunscreen, as well as sun avoidance.

## 2021-03-11 NOTE — TELEPHONE ENCOUNTER
Patient states that she was driving and did notice some palpitations with mild dizziness at the time. Patient denies chest pain, sob,  or syncope. She is not on AC.

## 2021-03-11 NOTE — TELEPHONE ENCOUNTER
Alert: additional afib greater than a 130bpm for greater than 1 hr. This was late in the day yesterday 3-10-21, also faxed report to office around 430p central time.

## 2021-03-11 NOTE — TELEPHONE ENCOUNTER
Spoke to pt. Pt wants to know why LIVIA is prescribing th Eliquis, when she has been diagnosed with palps for over 7 years? I tried to explain to pt that she has had some A Fib on her monitor and the Eliquis is an [facility] [place] that keeps the blood flowing and prevents blood clots. Pt is also on 81 mg Asprin. LIVIA please advise. Pt can be reached at [phone].     TY

## 2021-03-11 NOTE — TELEPHONE ENCOUNTER
Spoke with patient told her to importance of Eliquis and to continue aspirin. Is this okay?   Medications is pended for you to sign  Thank  you

## 2021-03-23 ENCOUNTER — IMMUNIZATION (OUTPATIENT)
Dept: PRIMARY CARE CLINIC | Age: 66
End: 2021-03-23
Payer: MEDICARE

## 2021-03-23 PROCEDURE — 0002A COVID-19, PFIZER VACCINE 30MCG/0.3ML DOSE: CPT | Performed by: NURSE PRACTITIONER

## 2021-03-23 PROCEDURE — 91300 COVID-19, PFIZER VACCINE 30MCG/0.3ML DOSE: CPT | Performed by: NURSE PRACTITIONER

## 2021-03-26 NOTE — FLOWSHEET NOTE
Physical Therapy Daily Treatment Note  Date:  2018    Patient Name:  Shannon Snell    :  1955  MRN: [id number]  Restrictions/Precautions:    Pertinent Medical History:Pt reports being of good health  Medical/Treatment Diagnosis Information:  · Diagnosis: Other closed displaced fracture of proximal end of right humerus, initial encounter (S42.291A ICD-10-.09 ICD-9-CM)  · Treatment Diagnosis: Right shoulder/ UE dysfunction due to GHJ hypomobility and RC weakness  Insurance/Certification information:  PT Insurance Information: [employer]  Physician Information:  Referring Practitioner: Dr Perales Marking of care signed (Y/N):  Routed 18  Visit# / total visits: 10  Pain level: 0-1/10     G-Code (if applicable):      Date / Visit # G-Code Applied:  /       Progress Note: []  Yes  [x]  No  Next due by: Visit #10      History of Injury: Pt was injured as a result of a slip and fall on ice , on  she had ORIF to repair the fracture, 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 18 Pt reports having no pain at her shoulder today. 18 Pt reports minimal right shoulder pain this am.  18 Pt reports she is moving her arm a little better but is not where she would like it to be yet. 18  Patient reports rom is improving some. 18 Pt reports no changes post last rx.  18 Patient reports shoulder mobility is improving. 18 Pt reports her shoulder is moving a little better. She has no pain this am.  18 Pt reports having no pain at this time.     Objective:  Observation:   Test measurements:     ROM: Right shoulder  Date      Shldr flexion    Shldr abd  Shldr IR         Shldr ER   A P A P A P A P   Eval 121  105  32  51     5/16/18  136  103  50  52     5/30/18  136  127  48  60     6/6/18  138  132  50  68                 Strength:  Date Shoulder flexion Shoulder abduction Shoulder IR Shoulder  ER Bicep   Eval 5/9/18 5/30/18  4/5 4/5 4+/5 4/5 4+/5                     Exercises:  Exercise/Equipment Resistance/Repetitions Other comments   UBE L2  3 min    OH pulleys 3 min scaption focused    UE wall ranger 4 way 20 x ea    T- Slide   Rows                  ER                   IR Orange 30 x  Yellow 30 x 2  Orange  30 x 2      Standing RUE reach behind back with LUE assist     Open cans                         Mat ex     Prone flies  Resume   Supine Cane assist ER 20\" x 5    SL'ing ER 3\" 15 x 2          HEP     Scap retraction 5\" 15-20 x 5/9 5/11   Sleeper's stretch 30\" x 4 5/9 5/11   Door ER stretch 30\" x 4 5/9 5/11   Supine ER cane assist ADD    Wall assist flex 5\" 15 x 5/30, 6/6   Towel assist RUE behind back 20\" x 6 6/4          Other Therapeutic Activities:    5/9/18 Quick dash was discussed with and applied to the pt. The pt. verbalized level of function and understanding. Home Exercise Program:   6/6/18 The patient demonstrated good tolerance to and understanding of the HEP. Written instructions have been issued. Manual Treatments:  15 min  DTM right Pec Minor and Subscap.   Mobs R GHJ distraction A-P and caudal ea grade 3  PROM stretch into ER, IR, Abd and Flex    Modalities:    CP declined    Timed Code Treatment Minutes:  40    Total Treatment Minutes:  45    Treatment/Activity Tolerance:  [x] Patient tolerated treatment well [] Patient limited by fatigue  [] Patient limited by pain  [] Patient limited by other medical complications  [] Other:     Prognosis: [x] Good [] Fair  [] Poor    Patient Requires Follow-up: [x] Yes  [] No    Plan:   [x] Continue per plan of care [] Alter current plan (see comments)  [x] Plan of other...

## 2021-03-30 PROCEDURE — 93228 REMOTE 30 DAY ECG REV/REPORT: CPT | Performed by: INTERNAL MEDICINE

## 2021-04-06 DIAGNOSIS — I48.92 ATRIAL FLUTTER, PAROXYSMAL (HCC): Primary | ICD-10-CM

## 2021-04-06 DIAGNOSIS — I48.0 PAROXYSMAL ATRIAL FIBRILLATION (HCC): ICD-10-CM

## 2021-04-06 RX ORDER — DILTIAZEM HYDROCHLORIDE 180 MG/1
180 CAPSULE, COATED, EXTENDED RELEASE ORAL DAILY
Qty: 90 CAPSULE | Refills: 3 | Status: SHIPPED | OUTPATIENT
Start: 2021-04-06 | End: 2021-11-17

## 2021-04-06 NOTE — TELEPHONE ENCOUNTER
Notified patient of JMB's recommendation. Med pending. Also needs refills on eliquis which is also pending.

## 2021-04-06 NOTE — TELEPHONE ENCOUNTER
Called and LMOV asking for call back re: monitor results. Cardiac event monitor showed AF burden of 5.52%. Elevated HR while in AF (average  () BPM). Average HR in NSR 82 () BPM.     Systolic BP averages 134, HR ranges between 70-90 BPM per Epic result flowsheets over the last few months. Patient was started on Eliquis 5mg BID 3/11/2021 after an alert came across from [employer] regarding AF w/ high HR >130 BPM. Patient currently on diltiazem 60mg PRN only for palpitations. JMB, do you have any recommended changes at this time?

## 2021-04-07 ENCOUNTER — TELEPHONE (OUTPATIENT)
Dept: CARDIOLOGY CLINIC | Age: 66
End: 2021-04-07

## 2021-04-07 DIAGNOSIS — I48.92 ATRIAL FLUTTER, PAROXYSMAL (HCC): Chronic | ICD-10-CM

## 2021-06-14 ENCOUNTER — OFFICE VISIT (OUTPATIENT)
Dept: CARDIOLOGY CLINIC | Age: 66
End: 2021-06-14
Payer: MEDICARE

## 2021-06-14 VITALS
HEIGHT: 64 IN | SYSTOLIC BLOOD PRESSURE: 108 MMHG | OXYGEN SATURATION: 98 % | BODY MASS INDEX: 23.05 KG/M2 | HEART RATE: 68 BPM | WEIGHT: 135 LBS | DIASTOLIC BLOOD PRESSURE: 60 MMHG

## 2021-06-14 DIAGNOSIS — I48.92 ATRIAL FLUTTER, PAROXYSMAL (HCC): Primary | ICD-10-CM

## 2021-06-14 DIAGNOSIS — I48.0 PAF (PAROXYSMAL ATRIAL FIBRILLATION) (HCC): ICD-10-CM

## 2021-06-14 PROCEDURE — 93000 ELECTROCARDIOGRAM COMPLETE: CPT | Performed by: NURSE PRACTITIONER

## 2021-06-14 PROCEDURE — 99214 OFFICE O/P EST MOD 30 MIN: CPT | Performed by: NURSE PRACTITIONER

## 2021-06-14 NOTE — PATIENT INSTRUCTIONS
Patient Education      Patient Education      Follow up in 6 months or sooner if needed     Sleep medicine referral for possible sleep apnea   Learning About Rhythm-Control Medicines  Introduction    Rhythm-control medicines return your heart to a normal rhythm. And they keep it at a normal rhythm. They are also called antiarrhythmics. Doctors may use these medicines for many reasons. These reasons include:  · You have symptoms from a heart rhythm problem. · You had electric cardioversion. Or you will have it. · You had catheter ablation. · You tried medicine to control your heart rate. But it did not help. · You are at risk of having a dangerous heart rhythm. These medicines can have serious side effects. Examples  · Amiodarone (Pacerone)  · Dofetilide (Tikosyn)  · Propafenone (Rythmol)  · Sotalol (Betapace AF)  Possible side effects  Side effects depend on which medicine you take. One serious one is a very irregular heart rate. Read the information that comes with your medicine. What to know about taking this medicine  · You may be in the hospital when you start this medicine. Your doctor will watch what it does to your heart. · Be sure to ask your doctor any questions. You may want to know more about the pros and cons of the medicine. · Your doctor will check you often. He or she will make sure you are not having problems. Be sure to go to all of your visits. · You may need regular blood tests. These make sure you are taking the right amount of medicine. · Take your medicines exactly as prescribed. Call your doctor if you think you are having a problem with your medicine. · Check with your doctor or pharmacist before you use any other medicines. This includes over-the-counter medicines. Make sure your doctor knows all of the medicines, vitamins, herbal products, and supplements you take. Taking some medicines together can cause problems. Where can you learn more?   Go to https://chpepiceweb.[email or web address] org and sign in to your GameCrush account. Enter B746 in the [facility] box to learn more about \"Learning About Rhythm-Control Medicines. \"     If you do not have an account, please click on the \"Sign Up Now\" link. Current as of: August 31, 2020               Content Version: 12.8  © 2006-2021 [employer]. Care instructions adapted under license by [facility] ([facility]). If you have questions about a medical condition or this instruction, always ask your healthcare professional. [place] [address] any warranty or liability for your use of this information. Learning About Catheter Ablation for Heart Rhythm Problems  What is catheter ablation? Catheter ablation is a procedure that treats heart rhythm problems. These problems include atrial fibrillation, supraventricular tachycardia (SVT), atrial flutter, and ventricular tachycardia. Your heart should have a strong, steady beat. That beat is controlled by the heart's electrical system. Sometimes that system misfires. This causes a heartbeat that is too fast and isn't steady. Catheter ablation is a way to get into your heart and fix the problem. Ablation is not surgery. How is catheter ablation done? Your doctor inserts thin tubes called catheters into a blood vessel in your groin, arm, or neck. Then your doctor feeds them into the heart. Wires in the catheters help the doctor find the problem areas. Then the doctor uses the wires to send energy to destroy the tiny areas of heart tissue that are causing the problems. It may seem like a bad idea to destroy parts of your heart on purpose. But the areas that are destroyed are very tiny. They should not affect your heart's ability to do its job. You may be awake during the procedure. Or you may be asleep. The doctor will give you medicines to help you feel relaxed and to numb the areas where the catheters go in.  You may feel a little uncomfortable, but you should not feel pain. What can you expect after catheter ablation? You may stay overnight in the hospital. How long you stay in the hospital depends on the type of ablation you have. Do not exercise hard or lift anything heavy for a week. You will probably be able to go back to work and to your normal routine in 1 or 2 days. You may have swelling, bruising, or a small lump around the site where the catheters went into your body. These should go away in 3 to 4 weeks. You may have to take some medicines for a while. Follow-up care is a key part of your treatment and safety. Be sure to make and go to all appointments, and call your doctor if you are having problems. It's also a good idea to know your test results and keep a list of the medicines you take. Where can you learn more? Go to https://AlediapepicZinio.[email or web address] org and sign in to your SupplyHog account. Enter G962 in the [employer] box to learn more about \"Learning About Catheter Ablation for Heart Rhythm Problems. \"     If you do not have an account, please click on the \"Sign Up Now\" link. Current as of: August 31, 2020               Content Version: 12.8  © 2006-2021 Healthwise[employer]. Care instructions adapted under license by [facility] ([facility]). If you have questions about a medical condition or this instruction, always ask your healthcare professional. [place] [address] any warranty or liability for your use of this information.

## 2021-06-14 NOTE — LETTER
[facility]   Electrophysiology Outpatient Note              Date:  June 14, 2021  Patient name: Fely Kearns  YOB: 1955    Primary Care physician: BRANDAN Wynne CNP    HISTORY OF PRESENT ILLNESS: The patient is a 76 y.o.  female with a history of AF, AFL, MVP, mild MR, and basal cell carcinoma. In 2014, she was admitted at [facility] for atrial flutter. In 2/2015, She wore an event monitor that showed several brief episodes of atrial flutter. In 2/2021, she was evaluated in the ED for lightheadedness, palpitations and nausea. ECG monitoring in the ER showed SR. She wore an event monitor from 3/1/2021 to 3/30/2021 that showed SR with PAF burden of 5%. Today she is being seen for PAF and AFL. EKG shows SR with a HR of 67. Patient reports daily walks as form of exercise. Complains that sometimes she is unable to walk as fast as she usually does. Reports occasional palpitations. Denies chest pain, shortness of breath, and dizziness. Also reports occasional snoring and frequent wakening with urination through out the night. Past Medical History:   has a past medical history of Atrial flutter, paroxysmal ([address] BCC (basal cell carcinoma), face, and Broken arm. Past Surgical History:   has a past surgical history that includes Skin cancer excision (2014); Cosmetic surgery (2014); and Mohs surgery (12/09/2019). Home Medications:    Prior to Admission medications    Medication Sig Start Date End Date Taking? Authorizing Provider   dilTIAZem (CARDIZEM CD) 180 MG extended release capsule Take 1 capsule by mouth daily 4/6/21  Yes Eze Hilton MD   apixaban (ELIQUIS) 5 MG TABS tablet Take 1 tablet by mouth 2 times daily 4/6/21  Yes Eze Hilton MD   pimecrolimus (ELIDEL) 1 % cream Apply topically 2 times daily. Patient taking differently: as needed Apply topically 2 times daily.  9/14/20  Yes Justina Smith MD   Multiple Vitamins-Minerals (MULTIVITAMIN ADULT PO) Take 1 tablet by mouth daily   Yes Historical Provider, MD   TRIAMCINOLONE ACETONIDE EX Apply topically every 14 days    Yes Historical Provider, MD   aspirin (ASPIRIN CHILDRENS) 81 MG chewable tablet Take 1 tablet by mouth daily. 2/14/14  Yes Felix Nelson MD       Allergies:  Patient has no known allergies. Social History:   reports that she has never smoked. She has never used smokeless tobacco. She reports current alcohol use. She reports that she does not use drugs. Family History: family history includes Arrhythmia (age of onset: 80) in her mother; Cancer in her father. All 14 point review of systems are completed and pertinent positives are mentioned in the history of present illness. Other systems are reviewed and are negative. PHYSICAL EXAM:    Vital signs:    /60   Pulse 68   Ht 5' 4\" (1.626 m)   Wt 135 lb (61.2 kg)   SpO2 98%   BMI 23.17 kg/m²      Constitutional and general appearance: alert, cooperative, no distress and appears stated age  HEENT: PERRL, no cervical lymphadenopathy. No masses palpable.  Normal oral mucosa  Respiratory:  · Normal excursion and expansion without use of accessory muscles  · Resp auscultation: Normal breath sounds without wheezing, rhonchi, and rales  Cardiovascular:  · The apical impulse is not displaced  · Heart tones are crisp and normal. regular S1 and S2.  · Jugular venous pulsation Normal  · The carotid upstroke is normal in amplitude and contour without delay or bruit  · Peripheral pulses are symmetrical and full   Abdomen:  · No masses or tenderness  · Bowel sounds present  Extremities:  ·  No cyanosis or clubbing  ·  No lower extremity edema  ·  Skin: warm and dry  Neurological:  · Alert and oriented  · Moves all extremities well  · No abnormalities of mood, affect, memory, mentation, or behavior are noted    DATA:    ECG 6/14/2021:  SR 67 bpm    Echo 2/2014:  Summary    -Normal left ventricle size, wall thickness and systolic function with an    estimated ejection fraction of 60%.  -Mild mitral regurgitation is present.    -There is mild tricuspid regurgitation with RVSP estimated at 31 mmHg. All labs and testing reviewed. CARDIOLOGY LABS:   CBC: No results for input(s): WBC, HGB, HCT, PLT in the last 72 hours. BMP: No results for input(s): NA, K, CO2, BUN, CREATININE, LABGLOM, GLUCOSE in the last 72 hours. PT/INR: No results for input(s): PROTIME, INR in the last 72 hours. APTT:No results for input(s): APTT in the last 72 hours. FASTING LIPID PANEL:No results found for: HDL, LDLDIRECT, LDLCALC, TRIG  LIVER PROFILE:No results for input(s): AST, ALT, ALB in the last 72 hours. IMPRESSION:    Patient Active Problem List   Diagnosis    Atrial flutter, paroxysmal (HCC)    Mitral regurgitation    MVP (mitral valve prolapse)    Closed fracture of right proximal humerus    Basal cell carcinoma (BCC) of right lower eyelid       Assessment:   Paroxsymal atrial arrhythmias (AF/AFL): stable    -[id number] score 2 (age, gender)  Mitral valve prolapse  Mild mitral regurgitation   Basal cell carcinoma s/p MOHS procedure 2019      Plan:   Continue Cardizem and Eliquis  Lengthy discussion regarding options for further management of atrial fibrillation including antiarrhythmic medications and radiofrequency catheter ablation. At this time, patient would like to continue current course with rate control and stroke prophylaxis. Written education also provided. She will contact the office if she changes her mind.    Referral to sleep medicine for FABIAN evaluation     Follow up in 6 months     ERICK Mcdowell [address] [facility], APRN-CNP  [facility]  [phone]

## 2021-06-14 NOTE — PROGRESS NOTES
[facility] [place]   Electrophysiology Outpatient Note              Date:  June 14, 2021  Patient name: Lakisha Mcdaniels  YOB: 1955    Primary Care physician: BRANDAN Allan CNP    HISTORY OF PRESENT ILLNESS: The patient is a 76 y.o.  female with a history of AF, AFL, MVP, mild MR, and basal cell carcinoma. In 2014, she was admitted at [facility] for atrial flutter. In 2/2015, She wore an event monitor that showed several brief episodes of atrial flutter. In 2/2021, she was evaluated in the ED for lightheadedness, palpitations and nausea. ECG monitoring in the ER showed SR. She wore an event monitor from 3/1/2021 to 3/30/2021 that showed SR with PAF burden of 5%. Today she is being seen for PAF and AFL. EKG shows SR with a HR of 67. Patient reports daily walks as form of exercise. Complains that sometimes she is unable to walk as fast as she usually does. Reports occasional palpitations. Denies chest pain, shortness of breath, and dizziness. Also reports occasional snoring and frequent wakening with urination through out the night. Past Medical History:   has a past medical history of Atrial flutter, paroxysmal ([address] BCC (basal cell carcinoma), face, and Broken arm. Past Surgical History:   has a past surgical history that includes Skin cancer excision (2014); Cosmetic surgery (2014); and Mohs surgery (12/09/2019). Home Medications:    Prior to Admission medications    Medication Sig Start Date End Date Taking? Authorizing Provider   dilTIAZem (CARDIZEM CD) 180 MG extended release capsule Take 1 capsule by mouth daily 4/6/21  Yes Reid Ogden MD   apixaban (ELIQUIS) 5 MG TABS tablet Take 1 tablet by mouth 2 times daily 4/6/21  Yes Reid Ogden MD   pimecrolimus (ELIDEL) 1 % cream Apply topically 2 times daily. Patient taking differently: as needed Apply topically 2 times daily.  9/14/20  Yes Katie Belle MD   Multiple Vitamins-Minerals (MULTIVITAMIN ADULT PO) Take 1 tablet by mouth daily   Yes Historical Provider, MD   TRIAMCINOLONE ACETONIDE EX Apply topically every 14 days    Yes Historical Provider, MD   aspirin (ASPIRIN CHILDRENS) 81 MG chewable tablet Take 1 tablet by mouth daily. 2/14/14  Yes Brannon Rocha MD       Allergies:  Patient has no known allergies. Social History:   reports that she has never smoked. She has never used smokeless tobacco. She reports current alcohol use. She reports that she does not use drugs. Family History: family history includes Arrhythmia (age of onset: 80) in her mother; Cancer in her father. All 14 point review of systems are completed and pertinent positives are mentioned in the history of present illness. Other systems are reviewed and are negative. PHYSICAL EXAM:    Vital signs:    /60   Pulse 68   Ht 5' 4\" (1.626 m)   Wt 135 lb (61.2 kg)   SpO2 98%   BMI 23.17 kg/m²      Constitutional and general appearance: alert, cooperative, no distress and appears stated age  HEENT: PERRL, no cervical lymphadenopathy. No masses palpable.  Normal oral mucosa  Respiratory:  · Normal excursion and expansion without use of accessory muscles  · Resp auscultation: Normal breath sounds without wheezing, rhonchi, and rales  Cardiovascular:  · The apical impulse is not displaced  · Heart tones are crisp and normal. regular S1 and S2.  · Jugular venous pulsation Normal  · The carotid upstroke is normal in amplitude and contour without delay or bruit  · Peripheral pulses are symmetrical and full   Abdomen:  · No masses or tenderness  · Bowel sounds present  Extremities:  ·  No cyanosis or clubbing  ·  No lower extremity edema  ·  Skin: warm and dry  Neurological:  · Alert and oriented  · Moves all extremities well  · No abnormalities of mood, affect, memory, mentation, or behavior are noted    DATA:    ECG 6/14/2021:  SR 67 bpm    Echo 2/2014:  Summary    -Normal left ventricle size, wall thickness and systolic function with an    estimated ejection fraction of 60%.  -Mild mitral regurgitation is present.    -There is mild tricuspid regurgitation with RVSP estimated at 31 mmHg. All labs and testing reviewed. CARDIOLOGY LABS:   CBC: No results for input(s): WBC, HGB, HCT, PLT in the last 72 hours. BMP: No results for input(s): NA, K, CO2, BUN, CREATININE, LABGLOM, GLUCOSE in the last 72 hours. PT/INR: No results for input(s): PROTIME, INR in the last 72 hours. APTT:No results for input(s): APTT in the last 72 hours. FASTING LIPID PANEL:No results found for: HDL, LDLDIRECT, LDLCALC, TRIG  LIVER PROFILE:No results for input(s): AST, ALT, ALB in the last 72 hours. IMPRESSION:    Patient Active Problem List   Diagnosis    Atrial flutter, paroxysmal (HCC)    Mitral regurgitation    MVP (mitral valve prolapse)    Closed fracture of right proximal humerus    Basal cell carcinoma (BCC) of right lower eyelid       Assessment:   Paroxsymal atrial arrhythmias (AF/AFL): stable    -[id number] score 2 (age, gender)  Mitral valve prolapse  Mild mitral regurgitation   Basal cell carcinoma s/p MOHS procedure 2019      Plan:   Continue Cardizem and Eliquis  Lengthy discussion regarding options for further management of atrial fibrillation including antiarrhythmic medications and radiofrequency catheter ablation. At this time, patient would like to continue current course with rate control and stroke prophylaxis. Written education also provided. She will contact the office if she changes her mind.    Referral to sleep medicine for FABIAN evaluation     Follow up in 6 months     ERICK Mcdowell [address] [facility], APRN-CNP  [facility]  [phone]

## 2021-07-15 ENCOUNTER — VIRTUAL VISIT (OUTPATIENT)
Dept: PULMONOLOGY | Age: 66
End: 2021-07-15
Payer: MEDICARE

## 2021-07-15 ENCOUNTER — PATIENT MESSAGE (OUTPATIENT)
Dept: PULMONOLOGY | Age: 66
End: 2021-07-15

## 2021-07-15 DIAGNOSIS — G25.81 RESTLESS LEG SYNDROME: ICD-10-CM

## 2021-07-15 DIAGNOSIS — I48.0 PAF (PAROXYSMAL ATRIAL FIBRILLATION) (HCC): ICD-10-CM

## 2021-07-15 DIAGNOSIS — I48.92 ATRIAL FLUTTER, PAROXYSMAL (HCC): Primary | Chronic | ICD-10-CM

## 2021-07-15 DIAGNOSIS — G47.30 OBSERVED SLEEP APNEA: ICD-10-CM

## 2021-07-15 PROCEDURE — 99202 OFFICE O/P NEW SF 15 MIN: CPT | Performed by: INTERNAL MEDICINE

## 2021-07-15 ASSESSMENT — SLEEP AND FATIGUE QUESTIONNAIRES
HOW LIKELY ARE YOU TO NOD OFF OR FALL ASLEEP WHILE SITTING QUIETLY AFTER LUNCH WITHOUT ALCOHOL: 0
HOW LIKELY ARE YOU TO NOD OFF OR FALL ASLEEP WHILE SITTING AND TALKING TO SOMEONE: 0
HOW LIKELY ARE YOU TO NOD OFF OR FALL ASLEEP WHEN YOU ARE A PASSENGER IN A CAR FOR AN HOUR WITHOUT A BREAK: 0
HOW LIKELY ARE YOU TO NOD OFF OR FALL ASLEEP WHILE WATCHING TV: 2
HOW LIKELY ARE YOU TO NOD OFF OR FALL ASLEEP IN A CAR, WHILE STOPPED FOR A FEW MINUTES IN TRAFFIC: 0
HOW LIKELY ARE YOU TO NOD OFF OR FALL ASLEEP WHILE SITTING INACTIVE IN A PUBLIC PLACE: 0
ESS TOTAL SCORE: 3
HOW LIKELY ARE YOU TO NOD OFF OR FALL ASLEEP WHILE LYING DOWN TO REST IN THE AFTERNOON WHEN CIRCUMSTANCES PERMIT: 0
HOW LIKELY ARE YOU TO NOD OFF OR FALL ASLEEP WHILE SITTING AND READING: 1

## 2021-07-15 NOTE — PROGRESS NOTES
MA Communication:   The following orders are received by verbal communication from Luis E Kuhn MD    Orders include:  HST (sleep center to contact pt)       FU scheduled 8/31/21

## 2021-07-15 NOTE — TELEPHONE ENCOUNTER
Called and s/w patient and informed her that we did not do that type of testing. She was fine with that and verbalized understanding.

## 2021-07-15 NOTE — PATIENT INSTRUCTIONS
Remember to bring a list of pulmonary medications and any CPAP or BiPAP machines to your next appointment with the office. Please keep all of your future appointments scheduled by [address], [facility] office. Out of respect for other patients and providers, you may be asked to reschedule your appointment if you arrive later than your scheduled appointment time. Appointments cancelled less than 24hrs in advance will be considered a no show. Patients with three missed appointments within 1 year or four missed appointments within 2 years can be dismissed from the practice. Please be aware that our physicians are required to work in the Intensive Care Unit at [facility].  Your appointment may need to be rescheduled if they are designated to work during your appointment time. You may receive a survey regarding the care you received during your visit. Your input is valuable to us. We encourage you to complete and return your survey. We hope you will choose us in the future for your healthcare needs. Pt instructed of all future appointment dates & times, including radiology, labs, procedures & referrals. If procedures were scheduled preparation instructions provided. Instructions on future appointments with [facility] were given.

## 2021-07-15 NOTE — PROGRESS NOTES
Chief Complaint/Referring Provider:  Patient is being seen at the request of JOHNNA Saldivar  for a consultation for sleep apnea     Presenting HPI: Patient is a 48-year-old female who was referred to the office for a pulmonary evaluation  Patient states that she was diagnosed as having atrial fibrillation/atrial flutter and as a part of the work-up she was told that she needs to be evaluated for sleep apnea and patient was referred to this office for further evaluation  Patient states that her evaluation for thyroid abnormality are normal, patient states that she does not have any sore throat patient does not have any otalgia no ear discharge, patient does not have any  difficulty in swallowing, no coughing or choking while eating, patient does have some sinus congestion at times, patient states that she can feel her abnormal heartbeat and has had palpitations, patient does not have any chest pain, patient does not have any increasing shortness of breath or any limitation of activities because of shortness of breath, patient does not have any significant fever or chills, patient does not have any abdominal discomfort nausea vomiting, patient does not have any history of taking too much of caffeine, patient has had restless leg syndrome as per the patient, patient does not give history of any acute iron deficiency anemia, patient does not have any history of smoking, patient does not have any change in the ambient environment any sick contacts, patient does not have any other pertinent review of system of concern    Past Medical History:   Diagnosis Date    Atrial flutter, paroxysmal ([facility] [address] 1/14    Dr Zuñiga= post viral infection no sx 2/2015    BCC (basal cell carcinoma), face 2014    Broken arm 01/2018    right arm       Past Surgical History:   Procedure Laterality Date    COSMETIC SURGERY  2014    On nose    MOHS SURGERY  12/09/2019    SKIN CANCER EXCISION  2014    BCC left nostril       No Known Allergies    Medication list was reviewed and updated as needed in [facility]    Social History     Socioeconomic History    Marital status:      Spouse name: Not on file    Number of children: Not on file    Years of education: Not on file    Highest education level: Not on file   Occupational History    Not on file   Tobacco Use    Smoking status: Never Smoker    Smokeless tobacco: Never Used   Vaping Use    Vaping Use: Never used   Substance and Sexual Activity    Alcohol use: Yes     Comment: occas    Drug use: No    Sexual activity: Not on file   Other Topics Concern    Not on file   Social History Narrative    Not on file     Social Determinants of Health     Financial Resource Strain:     Difficulty of Paying Living Expenses:    Food Insecurity:     Worried About Running Out of Food in the Last Year:     [address] in the Last Year:    Transportation Needs:     Lack of Transportation (Medical):  Lack of Transportation (Non-Medical):    Physical Activity:     Days of Exercise per Week:     Minutes of Exercise per Session:    Stress:     Feeling of Stress :    Social Connections:     Frequency of Communication with Friends and Family:     Frequency of Social Gatherings with Friends and Family:     Attends L[employer] day Services:     Active Member of Clubs or Organizations:     Attends Club or Organization Meetings:     Marital Status:    Intimate Partner Violence:     Fear of Current or Ex-Partner:     Emotionally Abused:     Physically Abused:     Sexually Abused:        Family History   Problem Relation Age of Onset    Arrhythmia Mother 80        a fib    Cancer Father          brain tumor / [place]    High Blood Pressure Neg Hx     High Cholesterol Neg Hx             Review of Systems same as above     Physical Exam:  not currently breastfeeding.'  Constitutional:  No acute distress. HENT:  Oropharynx is clear and moist. No thyromegaly.   Eyes:  Conjunctivae arenormal. Pupils equal, round, and reactive to light. No scleral icterus. Neck: . No tracheal deviation present. No obvious thyroid mass. Cardiovascular:Normal rate, regular rhythm, normal heart sounds. No right ventricular heave. Nolower extremity edema. Pulmonary/Chest: No wheezes. No rales. Chest wall is not dull to percussion. Noaccessory muscle usage or stridor. Abdominal: Soft. Bowel sounds present. No distension or hernia. Notenderness. Musculoskeletal: No cyanosis. No clubbing. No obvious joint deformity. Lymphadenopathy: No cervical or supraclavicular adenopathy. Skin: Skin is warm and dry. No rash or nodules on the exposed extremities. Psychiatric: Normal mood and affect. Behavior is normal.  No anxiety. Neurologic: Alert, awake and oriented. PERRL. Speech fluent  (deferred)    Sleep Medicine Data:  Sitting and reading: Slight chance of dozing  Watching TV: Moderate chance of dozing  Sitting, inactive in a public place (e.g. a theatre or a meeting): Would never doze  As a passenger in a car for an hour without a break: Would never doze  Lying down to rest in the afternoon when circumstances permit: Would never doze  Sitting and talking to someone: Would never doze  Sitting quietly after a lunch without alcohol: Would never doze  In a car, while stopped for a few minutes in traffic: Would never doze  Total score: 3       Data:     Imaging:  I have reviewed radiology images personally.   No orders to display     TWO XRAY VIEWS OF THE CHEST       2/16/2021 5:16 pm       COMPARISON:   02/13/2014       HISTORY:   ORDERING SYSTEM PROVIDED HISTORY: palpitations   TECHNOLOGIST PROVIDED HISTORY:   Reason for exam:->palpitations   Reason for Exam: palpitations       FINDINGS:   Heart size is normal.  Mediastinal contours are normal.  Pulmonary   vascularity is normal.  No focal lung consolidation is noted       Postsurgical changes are seen in the proximal humerus on the right         Impression   No acute disease     ECHO in 2014- Summary    -Normal left ventricle size, wall thickness and systolic function with an    estimated ejection fraction of 60%.  -Mild mitral regurgitation is present.    -There is mild tricuspid regurgitation with RVSP estimated at 31 mmHg. Assessment:    1. Atrial flutter, paroxysmal ([address]    - Home Sleep Study; Future    2. PAF (paroxysmal atrial fibrillation) ([address]    - Home Sleep Study; Future    3. Restless leg syndrome    - Home Sleep Study; Future    4. Observed sleep apnea    - Home Sleep Study;  Future        Plan:   Patient review of system were discussed  Patient's cardiac work-up including echocardiogram was reviewed  Patient has been put on Cardizem and Eliquis  Patient was told about the pathophysiology and sequelae of FABIAN  Pretest probability of having FABIAN which is significant discussed in detail  After discussion home sleep study being ordered for the patient-patient is closer to [facility] and would like to  the vest from there which was ordered  Diet and muscle modification discussed  Further management depending on patient clinical status and the test results

## 2021-07-15 NOTE — TELEPHONE ENCOUNTER
From: Millie Gannon  To: Juan Kenyon MD  Sent: 7/15/2021 2:06 PM EDT  Subject: Non-Urgent Medical Question    Hello,    For the sleep study, is it possible to substitute a smart watch for the vest? Someone told me of a smart watch being hooked to an iPhone for the purpose of recording sleep.     Thank you,    Millie Gannon

## 2021-08-04 ENCOUNTER — VIRTUAL VISIT (OUTPATIENT)
Dept: FAMILY MEDICINE CLINIC | Age: 66
End: 2021-08-04
Payer: MEDICARE

## 2021-08-04 DIAGNOSIS — B96.89 ACUTE BACTERIAL SINUSITIS: Primary | ICD-10-CM

## 2021-08-04 DIAGNOSIS — Z12.31 ENCOUNTER FOR SCREENING MAMMOGRAM FOR MALIGNANT NEOPLASM OF BREAST: ICD-10-CM

## 2021-08-04 DIAGNOSIS — J01.90 ACUTE BACTERIAL SINUSITIS: Primary | ICD-10-CM

## 2021-08-04 DIAGNOSIS — Z12.11 SCREENING FOR MALIGNANT NEOPLASM OF COLON: ICD-10-CM

## 2021-08-04 PROCEDURE — 3288F FALL RISK ASSESSMENT DOCD: CPT | Performed by: NURSE PRACTITIONER

## 2021-08-04 PROCEDURE — 99213 OFFICE O/P EST LOW 20 MIN: CPT | Performed by: NURSE PRACTITIONER

## 2021-08-04 RX ORDER — AMOXICILLIN 500 MG/1
500 CAPSULE ORAL 2 TIMES DAILY
Qty: 14 CAPSULE | Refills: 0 | Status: SHIPPED | OUTPATIENT
Start: 2021-08-04 | End: 2021-08-11

## 2021-08-04 SDOH — ECONOMIC STABILITY: TRANSPORTATION INSECURITY
IN THE PAST 12 MONTHS, HAS THE LACK OF TRANSPORTATION KEPT YOU FROM MEDICAL APPOINTMENTS OR FROM GETTING MEDICATIONS?: NO

## 2021-08-04 SDOH — ECONOMIC STABILITY: FOOD INSECURITY: WITHIN THE PAST 12 MONTHS, THE FOOD YOU BOUGHT JUST DIDN'T LAST AND YOU DIDN'T HAVE MONEY TO GET MORE.: NEVER TRUE

## 2021-08-04 SDOH — ECONOMIC STABILITY: FOOD INSECURITY: WITHIN THE PAST 12 MONTHS, YOU WORRIED THAT YOUR FOOD WOULD RUN OUT BEFORE YOU GOT MONEY TO BUY MORE.: NEVER TRUE

## 2021-08-04 SDOH — ECONOMIC STABILITY: TRANSPORTATION INSECURITY
IN THE PAST 12 MONTHS, HAS LACK OF TRANSPORTATION KEPT YOU FROM MEETINGS, WORK, OR FROM GETTING THINGS NEEDED FOR DAILY LIVING?: NO

## 2021-08-04 ASSESSMENT — ENCOUNTER SYMPTOMS
DIARRHEA: 0
TROUBLE SWALLOWING: 0
SINUS PRESSURE: 1
COLOR CHANGE: 0
ABDOMINAL DISTENTION: 0
VOICE CHANGE: 0
RHINORRHEA: 1
SHORTNESS OF BREATH: 0
SINUS PAIN: 0
EYE DISCHARGE: 0
COUGH: 0
FACIAL SWELLING: 0
ABDOMINAL PAIN: 0
SORE THROAT: 0
NAUSEA: 0
BACK PAIN: 0
CONSTIPATION: 0
CHEST TIGHTNESS: 0

## 2021-08-04 ASSESSMENT — SOCIAL DETERMINANTS OF HEALTH (SDOH): HOW HARD IS IT FOR YOU TO PAY FOR THE VERY BASICS LIKE FOOD, HOUSING, MEDICAL CARE, AND HEATING?: NOT HARD AT ALL

## 2021-08-04 ASSESSMENT — PATIENT HEALTH QUESTIONNAIRE - PHQ9
2. FEELING DOWN, DEPRESSED OR HOPELESS: 0
SUM OF ALL RESPONSES TO PHQ9 QUESTIONS 1 & 2: 0
1. LITTLE INTEREST OR PLEASURE IN DOING THINGS: 0
SUM OF ALL RESPONSES TO PHQ QUESTIONS 1-9: 0

## 2021-08-04 NOTE — PROGRESS NOTES
for cough, chest tightness and shortness of breath. Cardiovascular: Negative for chest pain, palpitations and leg swelling. Gastrointestinal: Negative for abdominal distention, abdominal pain, constipation, diarrhea and nausea. Endocrine: Negative for cold intolerance, heat intolerance, polydipsia, polyphagia and polyuria. Genitourinary: Negative for decreased urine volume, difficulty urinating, dysuria, flank pain, frequency and urgency. Musculoskeletal: Negative for arthralgias, back pain, gait problem, joint swelling, myalgias and neck pain. Skin: Negative for color change, rash and wound. Allergic/Immunologic: Negative for food allergies and immunocompromised state. Neurological: Positive for headaches. Negative for dizziness, tremors, speech difficulty, weakness, light-headedness and numbness. Hematological: Negative for adenopathy. Does not bruise/bleed easily. Psychiatric/Behavioral: Negative for confusion, decreased concentration, self-injury, sleep disturbance and suicidal ideas. The patient is not nervous/anxious.         Patient-Reported Vitals 8/4/2021   Patient-Reported Weight 130 lbs   Patient-Reported Height 5 4   Patient-Reported Systolic -   Patient-Reported Diastolic -   Patient-Reported Pulse -   Patient-Reported Temperature -        Physical Exam    [INSTRUCTIONS:  \"[x]\" Indicates a positive item  \"[]\" Indicates a negative item  -- DELETE ALL ITEMS NOT EXAMINED]    Constitutional: [x] Appears well-developed and well-nourished [x] No apparent distress      [] Abnormal -     Mental status: [x] Alert and awake  [x] Oriented to person/place/time [x] Able to follow commands    [] Abnormal -     Eyes:   EOM    [x]  Normal    [] Abnormal -   Sclera  [x]  Normal    [] Abnormal -          Discharge [x]  None visible   [x] Abnormal - watery eyes    HENT: [x] Normocephalic, atraumatic  [x] Abnormal - swollen nasal turbinates, nasal sound to voice  [x] Mouth/Throat: Mucous membranes are moist    External Ears [x] Normal  [] Abnormal -    Neck: [x] No visualized mass [] Abnormal -     Pulmonary/Chest: [x] Respiratory effort normal   [x] No visualized signs of difficulty breathing or respiratory distress        [] Abnormal -      Musculoskeletal:   [x] Normal gait with no signs of ataxia         [x] Normal range of motion of neck        [] Abnormal -     Neurological:        [x] No Facial Asymmetry (Cranial nerve 7 motor function) (limited exam due to video visit)          [x] No gaze palsy        [] Abnormal -          Skin:        [x] No significant exanthematous lesions or discoloration noted on facial skin         [] Abnormal -            Psychiatric:       [x] Normal Affect [] Abnormal -        [x] No Hallucinations    Other pertinent observable physical exam findings:-          On this date 8/4/2021 I have spent 20 minutes reviewing previous notes, test results and face to face (virtual) with the patient discussing the diagnosis and importance of compliance with the treatment plan as well as documenting on the day of the visit. Care Gaps Addressed  TDAP vaccine recommended- call insurance to discuss coverage  Due for lipid levels  Colon cancer screening- pt did cologuahitesh 2.5 years ago. Thinks it was ordered through gyno [employer] company to discuss coverage for shingles vaccine (Shingrix) 2 dose series   PNA vaccine recommended  AWV due this year with fasting labs  Mammogram 2019- due this year, scheduled for tomorrow        I have reviewed patient's pertinent medical history, relevant laboratory and imaging studies, and past/future health maintenance. Discussed with the patient the importance of adhering to their current medication regimen as directed. Advised the patient that they should continue to work on eating a healthy balanced diet and staying active by exercising within their personal limits. Orders as listed above.  Patient was advised to keep future appointments with their respective specialty care team(s). Patient had the opportunity to ask questions, all of which were answered to the best of my ability and with patient satisfaction. Patient understands and is agreeable with the care plan following today's visit. Patient is to schedule an appointment for any new or worsening symptoms. Go to ER for significant shortness of breath, chest pain, or uncontrolled pain or fever. I discussed with patient the risk and benefits of any medications that were prescribed today. I verified that the patient understands their medications, labs, and/or procedures. The patient is doing well with current medication regimen and does not have any barriers to adherence. The patient's self-management abilities are good. Follow Up in 1 Months for AWV and fasting labs if pt wants labs- normally goes through [address] per her preference    Urmila Hough is a 78 y.o. female being evaluated by a Virtual Visit (video visit) encounter to address concerns as mentioned above. A caregiver was present when appropriate. Due to this being a TeleHealth encounter (During [facility] [place] public health emergency), evaluation of the following organ systems was limited: Vitals/Constitutional/EENT/Resp/CV/GI//MS/Neuro/Skin/Heme-Lymph-Imm. Pursuant to the emergency declaration under the [address] [place] [address] authority and the [employer] Act, this Virtual Visit was conducted with patient's (and/or legal guardian's) consent, to reduce the patient's risk of exposure to COVID-19 and provide necessary medical care. The patient (and/or legal guardian) has also been advised to contact this office for worsening conditions or problems, and seek emergency medical treatment and/or call 911 if deemed necessary.     Services were provided through a video synchronous discussion virtually to substitute for in-person clinic visit. Patient was located at home and provider was located in office or at home. An electronic signature was used to authenticate this note.     --BRANDAN Shepard - CNP

## 2021-08-04 NOTE — PATIENT INSTRUCTIONS
Take medication in its entirety even if feeling better to avoid a resistance to antibiotics    Sinus rinse PRN    Saline nasal spray PRN      Patient Education        Learning About Breast Cancer Screening  What is breast cancer screening? Breast cancer occurs when cells that are not normal grow in one or both of your breasts. Screening tests can help find breast cancer early. Cancer is easier to treat when it's found early. Having concerns about breast cancer is common. That's why it's important to talk with your doctor about when to start and how often to get screened for breast cancer. How is breast cancer screening done? Several screening tests can be used to check for breast cancer. Mammograms. These tests check for signs of cancer using X-rays. They can show tumors that are too small for you or your doctor to feel. During a mammogram, a machine squeezes your breasts to make them flatter and easier to X-ray. At least two pictures are taken of each breast. One is taken from the top and one from the side. 3-D mammograms. These tests are also called digital breast tomosynthesis. Your breast is positioned on a flat plate. A top plate is pressed against your breast to keep it in position. The X-ray arm then moves in an arc above the breast and takes many pictures. A computer uses these X-rays to create a three-dimensional image. Clinical breast exam.   In this exam, your doctor carefully feels your breasts and under your arms to check for lumps or other changes. Who should be screened for breast cancer? Experts agree that mammograms are the best screening test for people at average risk of breast cancer. But they don't all agree on the age at which screening should start. And they don't agree on whether it's better to be screened every year or every two years. Here are some of the recommendations from experts:  · Start by age 36 and have a mammogram each year.   · Start at age 39 and have a mammogram each year. · Start at age 48 and have a mammogram every 2 years. When to stop having mammograms is another decision. You and your doctor can decide on the right age to start and stop screening based on your personal preferences and overall health. What is your risk for breast cancer? If you don't already know your risk of breast cancer, you can ask your doctor about it. You can also look it up at www.cancer.gov/bcrisktool/. If your doctor says that you have a high or very high risk, ask about ways to reduce your risk. These could include getting extra screening, taking medicine, or having surgery. If you have a strong family history of breast cancer, ask your doctor about genetic testing. What steps can you take to stay healthy? Some things that increase your risk of breast cancer, such as your age and being female, cannot be controlled. But you can do some things to stay as healthy as you can. · Learn what your breasts normally look and feel like. If you notice any changes, tell your doctor. · If you drink alcohol, limit how much you drink. Any amount of alcohol may increase your risk for some types of cancer. · If you smoke, quit. When you quit smoking, you lower your chances of getting many types of cancer. You can also do your best to eat well, be active, and stay at a healthy weight. Eating healthy foods and being active every day, as well as staying at a healthy weight, may help prevent cancer. Where can you learn more? Go to https://Locata Corporationmaria victoriaEvtron.INRFOOD. org and sign in to your Brandtree account. Enter K368 in the [facility] box to learn more about \"Learning About Breast Cancer Screening. \"     If you do not have an account, please click on the \"Sign Up Now\" link. Current as of: December 17, 2020               Content Version: 12.9  © 2006-2021 Healthwise, Incorporated. Care instructions adapted under license by [facility] ([facility]).  If you have questions about a medical condition or this instruction, always ask your healthcare professional. [place] [address] any warranty or liability for your use of this information. Patient Education        Mammogram: About This Test  What is it? A mammogram is an X-ray of the breast that is used to screen for breast cancer. This test can find tumors that are too small for you or your doctor to feel. Cancer is most easily treated when it is found at an early stage. Why is this test done? A mammogram is done to:  · Look for breast cancer in women who don't have symptoms. · Find breast cancer in women who have symptoms. Symptoms of breast cancer may include a lump or thickening in the breast, nipple discharge, or dimpling of the skin on one area of the breast.  · Find an area of suspicious breast tissue to remove for an exam under a microscope (biopsy). How do you prepare for the test?  If you've had a mammogram before at another clinic, have the results sent or bring them with you to your appointment. On the day of the mammogram, don't use any deodorant. And don't use perfume, powders, or ointments near or on your breasts. The residue left on your skin by these substances may interfere with the X-rays. How is the test done? · You will need to take off any jewelry that might interfere with the X-ray pictures. · You will need to take off your clothes above the waist.  · You will be given a cloth or paper gown to use during the test.  · You probably will stand during the mammogram.  · One at a time, your breasts will be placed on a flat plate. · Another plate is then pressed firmly against your breast to help flatten out the breast tissue. You may be asked to lift your arm. · For a few seconds while the X-ray picture is being taken, you will need to hold your breath. · At least two pictures are taken of each breast. One is taken from the top and one from the side. How does having a mammogram feel?   A mammogram is often uncomfortable but rarely painful. If you have sensitive or fragile skin or a skin condition, let the technician know before you have your exam. If you have menstrual periods, the procedure is more comfortable when done within 2 weeks after your period has ended. Having your breasts flattened is usually uncomfortable, but it helps the technician get the best images. How long does the test take? · The test will take about 10 to 15 minutes. You may be in the clinic for up to an hour. · You may be asked to wait a few minutes while the images are checked to make sure they don't need to be redone. What happens after the test?  · You will probably be able to go home right away. · You can go back to your usual activities right away. Follow-up care is a key part of your treatment and safety. Be sure to make and go to all appointments, and call your doctor if you are having problems. It's also a good idea to keep a list of the medicines you take. Ask your doctor when you can expect to have your test results. Where can you learn more? Go to https://Enviance.Novi. org and sign in to your "[employer]" account. Enter B642 in the Spool box to learn more about \"Mammogram: About This Test.\"     If you do not have an account, please click on the \"Sign Up Now\" link. Current as of: December 17, 2020               Content Version: 12.9  © 2006-2021 giftee. Care instructions adapted under license by [facility] ([facility]). If you have questions about a medical condition or this instruction, always ask your healthcare professional. [place] [address] any warranty or liability for your use of this information. Patient Education        Colon Cancer Screening: Care Instructions  Overview     Colorectal cancer occurs in the colon or rectum. That's the lower part of your digestive system. It often starts in small growths called polyps in the colon or rectum.  Polyps are usually found with screening tests. Depending on the type of test, any polyps found may be removed during the tests. Colorectal cancer usually does not cause symptoms at first. But regular tests can help find it early, before it spreads and becomes harder to treat. Your risk for colorectal cancer gets higher as you get older. Some experts say that adults should start regular screening at age 48 and stop at age 76. Others say to start before age 48 or continue after age 76. Talk with your doctor about your risk and when to start and stop screening. You may have one of several tests. Follow-up care is a key part of your treatment and safety. Be sure to make and go to all appointments, and call your doctor if you are having problems. It's also a good idea to know your test results and keep a list of the medicines you take. What are the main screening tests for colon cancer? The screening tests are:  Stool tests. These include the guaiac fecal occult blood test (gFOBT), the fecal immunochemical test (FIT), and the combined fecal immunochemical test and stool DNA test (FIT-DNA). These tests check stool samples for signs of cancer. If your test is positive, you will need to have a colonoscopy. Sigmoidoscopy. This test lets your doctor look at the lining of your rectum and the lowest part of your colon. Your doctor uses a lighted tube called a sigmoidoscope. This test can't find cancers or polyps in the upper part of your colon. In some cases, polyps that are found can be removed. But if your doctor finds polyps, you will need to have a colonoscopy to check the upper part of your colon. Colonoscopy. This test lets your doctor look at the lining of your rectum and your entire colon. The doctor uses a thin, flexible tool called a colonoscope. It can also be used to remove polyps or get a tissue sample (biopsy). A less common test is CT colonography (CTC). It's also called virtual colonoscopy.   Who should be screened for colorectal cancer? Your risk for colorectal cancer gets higher as you get older. Some experts say that adults should start regular screening at age 48 and stop at age 76. Others say to start before age 48 or continue after age 76. Talk with your doctor about your risk and when to start and stop screening. How often you need screening depends on the type of test you get:  Stool tests. Every 1 or 2 years for FIT or gFOBT. Every 3 years for sDNA, also called FIT-DNA. Tests that look inside the colon. Every 5 or 10 years for sigmoidoscopy. Every 5 years for CT colonography (virtual colonoscopy). Every 10 years for colonoscopy. Experts agree that people at higher risk may need to be tested sooner and more often. This includes people who have a strong family history of colon cancer. Talk to your doctor about which test is best for you and when to be tested. When should you call for help? Watch closely for changes in your health, and be sure to contact your doctor if:    · You have any changes in your bowel habits.     · You have any problems. Where can you learn more? Go to https://Baremetrics.BuscapÃ©. org and sign in to your Sentisis account. Enter [phone] in the [employer] box to learn more about \"Colon Cancer Screening: Care Instructions. \"     If you do not have an account, please click on the \"Sign Up Now\" link. Current as of: December 17, 2020               Content Version: 12.9  © 7907-1949 DrNaturalHealing. Care instructions adapted under license by [facility] ([facility]). If you have questions about a medical condition or this instruction, always ask your healthcare professional. [place] [address] any warranty or liability for your use of this information. Patient Education        Saline Nasal Washes: Care Instructions  Your Care Instructions     Saline nasal washes help keep the nasal passages open by washing out thick or dried mucus. This simple remedy can help relieve symptoms of allergies, sinusitis, and colds. It also can make the nose feel more comfortable by keeping the mucous membranes moist. You may notice a little burning sensation in your nose the first few times you use the solution, but this usually gets better in a few days. Follow-up care is a key part of your treatment and safety. Be sure to make and go to all appointments, and call your doctor if you are having problems. It's also a good idea to know your test results and keep a list of the medicines you take. How can you care for yourself at home? · You can buy premixed saline solution in a squeeze bottle or other sinus rinse products at a drugstore. Read and follow the instructions on the label. · You also can make your own saline solution by adding 1 teaspoon of salt and 1 teaspoon of baking soda to 2 cups of distilled water. · If you use a homemade solution, pour a small amount into a clean bowl. Using a rubber bulb syringe, squeeze the syringe and place the tip in the salt water. Pull a small amount of the salt water into the syringe by relaxing your hand. · Sit down with your head tilted slightly back. Do not lie down. Put the tip of the bulb syringe or the squeeze bottle a little way into one of your nostrils. Gently drip or squirt a few drops into the nostril. Repeat with the other nostril. Some sneezing and gagging are normal at first.  · Gently blow your nose. · Wipe the syringe or bottle tip clean after each use. · Repeat this 2 or 3 times a day. · Use nasal washes gently if you have nosebleeds often. When should you call for help? Watch closely for changes in your health, and be sure to contact your doctor if:    · You often get nosebleeds.     · You have problems doing the nasal washes. Where can you learn more? Go to https://chendy.Decision Curve. org and sign in to your Turing Data account.  Enter [phone] in the [employer] box to learn more about \"Saline Nasal Washes: Care Instructions. \"     If you do not have an account, please click on the \"Sign Up Now\" link. Current as of: December 2, 2020               Content Version: 12.9  © 2006-2021 LikeAndy. Care instructions adapted under license by [facility] ([facility]). If you have questions about a medical condition or this instruction, always ask your healthcare professional. [place] [address] any warranty or liability for your use of this information. Patient Education        Sinusitis: Care Instructions  Your Care Instructions     Sinusitis is an infection of the lining of the sinus cavities in your head. Sinusitis often follows a cold. It causes pain and pressure in your head and face. In most cases, sinusitis gets better on its own in 1 to 2 weeks. But some mild symptoms may last for several weeks. Sometimes antibiotics are needed. Follow-up care is a key part of your treatment and safety. Be sure to make and go to all appointments, and call your doctor if you are having problems. It's also a good idea to know your test results and keep a list of the medicines you take. How can you care for yourself at home? · Take an over-the-counter pain medicine, such as acetaminophen (Tylenol), ibuprofen (Advil, Motrin), or naproxen (Aleve). Read and follow all instructions on the label. · If the doctor prescribed antibiotics, take them as directed. Do not stop taking them just because you feel better. You need to take the full course of antibiotics. · Be careful when taking over-the-counter cold or flu medicines and Tylenol at the same time. Many of these medicines have acetaminophen, which is Tylenol. Read the labels to make sure that you are not taking more than the recommended dose. Too much acetaminophen (Tylenol) can be harmful. · Breathe warm, moist air from a steamy shower, a hot bath, or a sink filled with hot water. Avoid cold, dry air.  Using a humidifier in your home may help. Follow the directions for cleaning the machine. · Use saline (saltwater) nasal washes. This can help keep your nasal passages open and wash out mucus and bacteria. You can buy saline nose drops at a grocery store or drugstore. Or you can make your own at home by adding 1 teaspoon of salt and 1 teaspoon of baking soda to 2 cups of distilled water. If you make your own, fill a bulb syringe with the solution, insert the tip into your nostril, and squeeze gently. Jurgen Mackman your nose. · Put a hot, wet towel or a warm gel pack on your face 3 or 4 times a day for 5 to 10 minutes each time. · Try a decongestant nasal spray like oxymetazoline (Afrin). Do not use it for more than 3 days in a row. Using it for more than 3 days can make your congestion worse. When should you call for help? Call your doctor now or seek immediate medical care if:    · You have new or worse swelling or redness in your face or around your eyes.     · You have a new or higher fever. Watch closely for changes in your health, and be sure to contact your doctor if:    · You have new or worse facial pain.     · The mucus from your nose becomes thicker (like pus) or has new blood in it.     · You are not getting better as expected. Where can you learn more? Go to https://RetailerSaver.compeSwoon Editions.myNoticePeriod.com. org and sign in to your Jimdo account. Enter Z782 in the [facility] box to learn more about \"Sinusitis: Care Instructions. \"     If you do not have an account, please click on the \"Sign Up Now\" link. Current as of: December 2, 2020               Content Version: 12.9  © 4798-2423 Healthwise, Incorporated. Care instructions adapted under license by [facility] ([facility]). If you have questions about a medical condition or this instruction, always ask your healthcare professional. [address] any warranty or liability for your use of this information.

## 2021-08-05 ENCOUNTER — HOSPITAL ENCOUNTER (OUTPATIENT)
Dept: MAMMOGRAPHY | Age: 66
Discharge: HOME | End: 2021-08-05
Payer: MEDICARE

## 2021-08-05 VITALS — HEIGHT: 64 IN | WEIGHT: 130 LBS | BODY MASS INDEX: 22.2 KG/M2

## 2021-08-05 DIAGNOSIS — Z12.31 ENCOUNTER FOR SCREENING MAMMOGRAM FOR MALIGNANT NEOPLASM OF BREAST: ICD-10-CM

## 2021-08-05 PROCEDURE — 77063 BREAST TOMOSYNTHESIS BI: CPT

## 2021-08-09 ENCOUNTER — HOSPITAL ENCOUNTER (OUTPATIENT)
Dept: SLEEP CENTER | Age: 66
Discharge: HOME | End: 2021-08-09
Payer: MEDICARE

## 2021-08-09 DIAGNOSIS — I48.92 ATRIAL FLUTTER, PAROXYSMAL (HCC): Chronic | ICD-10-CM

## 2021-08-09 DIAGNOSIS — G47.30 OBSERVED SLEEP APNEA: ICD-10-CM

## 2021-08-09 DIAGNOSIS — I48.0 PAF (PAROXYSMAL ATRIAL FIBRILLATION) (HCC): ICD-10-CM

## 2021-08-09 DIAGNOSIS — G25.81 RESTLESS LEG SYNDROME: ICD-10-CM

## 2021-08-09 PROCEDURE — 95806 SLEEP STUDY UNATT&RESP EFFT: CPT

## 2021-08-09 PROCEDURE — 95806 SLEEP STUDY UNATT&RESP EFFT: CPT | Performed by: INTERNAL MEDICINE

## 2021-08-10 DIAGNOSIS — R92.8 ABNORMAL MAMMOGRAM: Primary | ICD-10-CM

## 2021-08-11 ENCOUNTER — TELEPHONE (OUTPATIENT)
Dept: FAMILY MEDICINE CLINIC | Age: 66
End: 2021-08-11

## 2021-08-11 ENCOUNTER — HOSPITAL ENCOUNTER (OUTPATIENT)
Dept: MAMMOGRAPHY | Age: 66
Discharge: HOME | End: 2021-08-11
Payer: MEDICARE

## 2021-08-11 DIAGNOSIS — R92.8 ABNORMAL MAMMOGRAM: ICD-10-CM

## 2021-08-11 PROCEDURE — G0279 TOMOSYNTHESIS, MAMMO: HCPCS

## 2021-08-11 NOTE — TELEPHONE ENCOUNTER
Pt had a cologuard within the last three years. They did receive two orders--one from [address] [place] [address] and one [place]. They can keep one on file and send to pt in November after the 3 year vance. If that is ok no need to call --call only if you want to cancel the order and redo later in the year.

## 2021-08-31 ENCOUNTER — VIRTUAL VISIT (OUTPATIENT)
Dept: PULMONOLOGY | Age: 66
End: 2021-08-31
Payer: MEDICARE

## 2021-08-31 ENCOUNTER — TELEPHONE (OUTPATIENT)
Dept: PULMONOLOGY | Age: 66
End: 2021-08-31

## 2021-08-31 DIAGNOSIS — G47.33 OSA (OBSTRUCTIVE SLEEP APNEA): Primary | ICD-10-CM

## 2021-08-31 DIAGNOSIS — G25.81 RESTLESS LEG SYNDROME: ICD-10-CM

## 2021-08-31 DIAGNOSIS — I48.0 PAF (PAROXYSMAL ATRIAL FIBRILLATION) (HCC): ICD-10-CM

## 2021-08-31 PROCEDURE — 99212 OFFICE O/P EST SF 10 MIN: CPT | Performed by: INTERNAL MEDICINE

## 2021-08-31 ASSESSMENT — SLEEP AND FATIGUE QUESTIONNAIRES
HOW LIKELY ARE YOU TO NOD OFF OR FALL ASLEEP WHILE WATCHING TV: 0
HOW LIKELY ARE YOU TO NOD OFF OR FALL ASLEEP WHILE SITTING AND TALKING TO SOMEONE: 0
HOW LIKELY ARE YOU TO NOD OFF OR FALL ASLEEP WHILE SITTING QUIETLY AFTER LUNCH WITHOUT ALCOHOL: 0
ESS TOTAL SCORE: 2
HOW LIKELY ARE YOU TO NOD OFF OR FALL ASLEEP WHEN YOU ARE A PASSENGER IN A CAR FOR AN HOUR WITHOUT A BREAK: 1
HOW LIKELY ARE YOU TO NOD OFF OR FALL ASLEEP WHILE SITTING AND READING: 0
HOW LIKELY ARE YOU TO NOD OFF OR FALL ASLEEP WHILE SITTING INACTIVE IN A PUBLIC PLACE: 0
HOW LIKELY ARE YOU TO NOD OFF OR FALL ASLEEP WHILE LYING DOWN TO REST IN THE AFTERNOON WHEN CIRCUMSTANCES PERMIT: 1
HOW LIKELY ARE YOU TO NOD OFF OR FALL ASLEEP IN A CAR, WHILE STOPPED FOR A FEW MINUTES IN TRAFFIC: 0

## 2021-08-31 NOTE — PROGRESS NOTES
Chief Complaint/Referring Provider: Pulmonary follow-up and to discuss the test results    Patient was subjected to a home sleep study on the basis of patient's previous evaluations, patient was called to follow-up on a virtual basis, patient does not have any new symptoms at this time, patient does not have any increasing cough expectoration shortness of breath or wheezing, patient does not have any chest pain or palpitations, patient does not have any fever chills abdominal discomfort or any increasing leg edema, patient does not have any new symptoms or medications per se, no other pertinent review of system of concern     Previous  HPI: Patient is a 50-year-old female who was referred to the office for a pulmonary evaluation  Patient states that she was diagnosed as having atrial fibrillation/atrial flutter and as a part of the work-up she was told that she needs to be evaluated for sleep apnea and patient was referred to this office for further evaluation  Patient states that her evaluation for thyroid abnormality are normal, patient states that she does not have any sore throat patient does not have any otalgia no ear discharge, patient does not have any  difficulty in swallowing, no coughing or choking while eating, patient does have some sinus congestion at times, patient states that she can feel her abnormal heartbeat and has had palpitations, patient does not have any chest pain, patient does not have any increasing shortness of breath or any limitation of activities because of shortness of breath, patient does not have any significant fever or chills, patient does not have any abdominal discomfort nausea vomiting, patient does not have any history of taking too much of caffeine, patient has had restless leg syndrome as per the patient, patient does not give history of any acute iron deficiency anemia, patient does not have any history of smoking, patient does not have any change in the ambient environment any sick contacts, patient does not have any other pertinent review of system of concern    Past Medical History:   Diagnosis Date    Atrial flutter, paroxysmal ([facility] [address] 1/14    Dr Zuñiga= post viral infection no sx 2/2015    BCC (basal cell carcinoma), face 2014    Broken arm 01/2018    right arm       Past Surgical History:   Procedure Laterality Date    COSMETIC SURGERY  2014    On nose    MOHS SURGERY  12/09/2019    SKIN CANCER EXCISION  2014    BCC left nostril       No Known Allergies    Medication list was reviewed and updated as needed in [facility]    Social History     Socioeconomic History    Marital status:      Spouse name: Not on file    Number of children: Not on file    Years of education: Not on file    Highest education level: Not on file   Occupational History    Not on file   Tobacco Use    Smoking status: Never Smoker    Smokeless tobacco: Never Used   Vaping Use    Vaping Use: Never used   Substance and Sexual Activity    Alcohol use: Yes     Comment: occas    Drug use: No    Sexual activity: Not on file   Other Topics Concern    Not on file   Social History Narrative    Not on file     Social Determinants of Health     Financial Resource Strain: Low Risk     Difficulty of Paying Living Expenses: Not hard at all   Food Insecurity: No Food Insecurity    Worried About [address] in the Last Year: Never true    [facility] in the Last Year: Never true   Transportation Needs: No Transportation Needs    Lack of Transportation (Medical): No    Lack of Transportation (Non-Medical):  No   Physical Activity:     Days of Exercise per Week:     Minutes of Exercise per Session:    Stress:     Feeling of Stress :    Social Connections:     Frequency of Communication with Friends and Family:     Frequency of Social Gatherings with Friends and Family:     Attends Orthodox Services:     Active Member of Clubs or Organizations:     Attends Club or Organization Meetings:     Marital Status:    Intimate Partner Violence:     Fear of Current or Ex-Partner:     Emotionally Abused:     Physically Abused:     Sexually Abused:        Family History   Problem Relation Age of Onset    Arrhythmia Mother 80        a fib    Cancer Father          brain tumor / Aisha Cardinal    High Blood Pressure Neg Hx     High Cholesterol Neg Hx             Review of Systems same as above     Physical Exam:  not currently breastfeeding.'  Constitutional:  No acute distress. HENT:  Oropharynx is clear and moist. No thyromegaly. Eyes:  Conjunctivae arenormal. Pupils equal, round, and reactive to light. No scleral icterus. Neck: . No tracheal deviation present. No obvious thyroid mass. Cardiovascular:Normal rate, regular rhythm, normal heart sounds. No right ventricular heave. Nolower extremity edema. Pulmonary/Chest: No wheezes. No rales. Chest wall is not dull to percussion. Noaccessory muscle usage or stridor. Abdominal: Soft. Bowel sounds present. No distension or hernia. Notenderness. Musculoskeletal: No cyanosis. No clubbing. No obvious joint deformity. Lymphadenopathy: No cervical or supraclavicular adenopathy. Skin: Skin is warm and dry. No rash or nodules on the exposed extremities. Psychiatric: Normal mood and affect. Behavior is normal.  No anxiety. Neurologic: Alert, awake and oriented. PERRL. Speech fluent  (deferred)    Sleep Medicine Data:  Sitting and reading: Would never doze  Watching TV: Would never doze  Sitting, inactive in a public place (e.g. a theatre or a meeting):  Would never doze  As a passenger in a car for an hour without a break: Slight chance of dozing  Lying down to rest in the afternoon when circumstances permit: Slight chance of dozing  Sitting and talking to someone: Would never doze  Sitting quietly after a lunch without alcohol: Would never doze  In a car, while stopped for a few minutes in traffic: Would never doze  Total score: 2 Data:     Imaging:  I have reviewed radiology images personally. No orders to display     TWO XRAY VIEWS OF THE CHEST       2/16/2021 5:16 pm       COMPARISON:   02/13/2014       HISTORY:   ORDERING SYSTEM PROVIDED HISTORY: palpitations   TECHNOLOGIST PROVIDED HISTORY:   Reason for exam:->palpitations   Reason for Exam: palpitations       FINDINGS:   Heart size is normal.  Mediastinal contours are normal.  Pulmonary   vascularity is normal.  No focal lung consolidation is noted       Postsurgical changes are seen in the proximal humerus on the right           Impression   No acute disease     ECHO in 2014- Summary    -Normal left ventricle size, wall thickness and systolic function with an    estimated ejection fraction of 60%.  -Mild mitral regurgitation is present.    -There is mild tricuspid regurgitation with RVSP estimated at 31 mmHg. Patient sleep read shows that patient has minimal FABIAN with AHI of 5.5/h along with that patient has some nocturnal hypoxemia with saturation dropping to 81% but patient's saturation below 89% was only for 1.6 minutes    Assessment:    1. Atrial flutter, paroxysmal ([address]      2. PAF (paroxysmal atrial fibrillation) ([address]        3. Restless leg syndrome        4.  Obstructive  sleep apnea            Plan:   Patient review of system were discussed  Patient was told about the sleep results along with implications  Patient does not have any significant sleep apnea which needs any intervention at this time which was explained to the patient  Patient does not have any obvious nocturnal hypoxemia which needs any further evaluation  Diet and muscle modification discussed  No intervention required from pulmonary standpoint of view for any sleep apnea  Patient can follow-up on whenever necessary basis

## 2021-08-31 NOTE — TELEPHONE ENCOUNTER
Within this Telehealth Consent, the terms you and yours refer to the person using the Telehealth Service (Service), or in the case of a use of the Service by or on behalf of a minor, you and yours refer to and include (i) the parent or legal guardian who provides consent to the use of the Service by such minor or uses the Service on behalf of such minor, and (ii) the minor for whom consent is being provided or on whose behalf the Service is being utilized. When using Service, you will be consulting with your health care providers via the use of Telehealth.   Telehealth involves the delivery of healthcare services using electronic communications, information technology or other means between a healthcare provider and a patient who are not in the same physical location. Telehealth may be used for diagnosis, treatment, follow-up and/or patient education, and may include, but is not limited to, one or more of the following:    Electronic transmission of medical records, photo images, personal health information or other data between a patient and a healthcare provider    Interactions between a patient and healthcare provider via audio, video and/or data communications    Use of output data from medical devices, sound and video files    Anticipated Benefits   The use of Telehealth by your Provider(s) through the Service may have the following possible benefits:    Making it easier and more efficient for you to access medical care and treatment for the conditions treated by such Provider(s) utilizing the Service    Allowing you to obtain medical care and treatment by Provider(s) at times that are convenient for you    Enabling you to interact with Provider(s) without the necessity of an in-office appointment     Possible Risks   While the use of Telehealth can provide potential benefits for you, there are also potential risks associated with the use of Telehealth.  These risks include, but may not be limited to the following:    Your Provider(s) may not able to provide medical treatment for your particular condition and you may be required to seek alternative healthcare or emergency care services.  The electronic systems or other security protocols or safeguards used in the Service could fail, causing a breach of privacy of your medical or other information.  Given regulatory requirements in certain jurisdictions, your Provider(s) diagnosis and/or treatment options, especially pertaining to certain prescriptions, may be limited. Acceptance   1. You understand that Services will be provided via Telehealth. This process involves the use of HIPAA compliant and secure, real-time audio-visual interfacing with a qualified and appropriately trained provider located at [facility]. 2. You understand that, under no circumstances, will this session be recorded. 3. You understand that the Provider(s) at [facility] and other clinical participants will be party to the information obtained during the Telehealth session in accordance with best medical practices. 4. You understand that the information obtained during the Telehealth session will be used to help determine the most appropriate treatment options. 5. You understand that You have the right to revoke this consent at any point in time. 6. You understand that Telehealth is voluntary, and that continued treatment is not dependent upon consent. 7. You understand that, in the event of non-consent to Telehealth services and/or technical difficulties, you will obtain services as typically provided in the absence of Telehealth technology. 8. You understand that this consent will be kept in Your medical record. 9. No potential benefits from the use of Telehealth or specific results can be guaranteed. Your condition may not be cured or improved and, in some cases, may get worse.    10. There are limitations in the provision of medical care and treatment via Telehealth and the Service and you may not be able to receive diagnosis and/or treatment through the Service for every condition for which you seek diagnosis and/or treatment. 11. There are potential risks to the use of Telehealth, including but not limited to the risks described in this Telehealth Consent. 12. Your Provider(s) have discussed the use of Telehealth and the Service with you, including the benefits and risks of such and you have provided oral consent to your Provider(s) for the use of Telehealth and the Service. 15. You understand that it is your duty to provide your Provider(s) truthful, accurate and complete information, including all relevant information regarding care that you may have received or may be receiving from other healthcare providers outside of the Service. 14. You understand that each of your Provider(s) may determine in his or sole discretion that your condition is not suitable for diagnosis and/or treatment using the Service, and that you may need to seek medical care and treatment a specialist or other healthcare provider, outside of the Service. 15. You understand that you are fully responsible for payment for all services provided by Provider(s) or through use of the Service and that you may not be able to use third-party insurance. 16. You represent that (a) you have read this Telehealth Consent carefully, (b) you understand the risks and benefits of the Service and the use of Telehealth in the medical care and treatment provided to you by Provider(s) using the Service, and (c) you have the legal capacity and authority to provide this consent for yourself and/or the minor for which you are consenting under applicable federal and state laws, including laws relating to the age of [de-identified] and/or parental/guardian consent.    17. You give your informed consent to the use of Telehealth by Provider(s) using the Service under the terms described in the Terms of Service and this Telehealth Consent. The patient was read the following statement and has consented to the visit as of 8/31/21.      The patient has been scheduled for their first telehealth visit on 8/31/21 with Bhavya Baez MD.

## 2021-08-31 NOTE — PATIENT INSTRUCTIONS
Remember to bring a list of pulmonary medications and any CPAP or BiPAP machines to your next appointment with the office. Please keep all of your future appointments scheduled by [address], [facility]. Out of respect for other patients and providers, you may be asked to reschedule your appointment if you arrive later than your scheduled appointment time. Appointments cancelled less than 24hrs in advance will be considered a no show. Patients with three missed appointments within 1 year or four missed appointments within 2 years can be dismissed from the practice. Please be aware that our physicians are required to work in the Intensive Care Unit at [facility].  Your appointment may need to be rescheduled if they are designated to work during your appointment time. You may receive a survey regarding the care you received during your visit. Your input is valuable to us. We encourage you to complete and return your survey. We hope you will choose us in the future for your healthcare needs. Pt instructed of all future appointment dates & times, including radiology, labs, procedures & referrals. If procedures were scheduled preparation instructions provided. Instructions on future appointments with [facility] were given.

## 2021-08-31 NOTE — PROGRESS NOTES
MA Communication: The following orders are received by verbal communication from Magdalena Plzaa MD    Orders include:     Follow up as needed

## 2021-09-06 ASSESSMENT — LIFESTYLE VARIABLES
AUDIT TOTAL SCORE: 0
HOW OFTEN DURING THE LAST YEAR HAVE YOU BEEN UNABLE TO REMEMBER WHAT HAPPENED THE NIGHT BEFORE BECAUSE YOU HAD BEEN DRINKING: 0
AUDIT-C TOTAL SCORE: 0
HOW OFTEN DURING THE LAST YEAR HAVE YOU NEEDED AN ALCOHOLIC DRINK FIRST THING IN THE MORNING TO GET YOURSELF GOING AFTER A NIGHT OF HEAVY DRINKING: NEVER
HOW OFTEN DO YOU HAVE A DRINK CONTAINING ALCOHOL: FOUR OR MORE TIMES A WEEK
HOW OFTEN DURING THE LAST YEAR HAVE YOU NEEDED AN ALCOHOLIC DRINK FIRST THING IN THE MORNING TO GET YOURSELF GOING AFTER A NIGHT OF HEAVY DRINKING: 0
AUDIT-C TOTAL SCORE: 4
HOW OFTEN DURING THE LAST YEAR HAVE YOU HAD A FEELING OF GUILT OR REMORSE AFTER DRINKING: NEVER
HOW OFTEN DO YOU HAVE A DRINK CONTAINING ALCOHOL: 4
HOW MANY STANDARD DRINKS CONTAINING ALCOHOL DO YOU HAVE ON A TYPICAL DAY: 0
HOW MANY STANDARD DRINKS CONTAINING ALCOHOL DO YOU HAVE ON A TYPICAL DAY: ONE OR TWO
HOW OFTEN DURING THE LAST YEAR HAVE YOU HAD A FEELING OF GUILT OR REMORSE AFTER DRINKING: 0
HOW OFTEN DO YOU HAVE SIX OR MORE DRINKS ON ONE OCCASION: NEVER
HAS A RELATIVE, FRIEND, DOCTOR, OR ANOTHER HEALTH PROFESSIONAL EXPRESSED CONCERN ABOUT YOUR DRINKING OR SUGGESTED YOU CUT DOWN: NO
HOW OFTEN DURING THE LAST YEAR HAVE YOU BEEN UNABLE TO REMEMBER WHAT HAPPENED THE NIGHT BEFORE BECAUSE YOU HAD BEEN DRINKING: NEVER
HOW OFTEN DURING THE LAST YEAR HAVE YOU FAILED TO DO WHAT WAS NORMALLY EXPECTED FROM YOU BECAUSE OF DRINKING: 0
HAVE YOU OR SOMEONE ELSE BEEN INJURED AS A RESULT OF YOUR DRINKING: 0
AUDIT TOTAL SCORE: 4
HAS A RELATIVE, FRIEND, DOCTOR, OR ANOTHER HEALTH PROFESSIONAL EXPRESSED CONCERN ABOUT YOUR DRINKING OR SUGGESTED YOU CUT DOWN: 0
HOW OFTEN DO YOU HAVE SIX OR MORE DRINKS ON ONE OCCASION: 0
HOW OFTEN DURING THE LAST YEAR HAVE YOU FOUND THAT YOU WERE NOT ABLE TO STOP DRINKING ONCE YOU HAD STARTED: NEVER
HOW OFTEN DURING THE LAST YEAR HAVE YOU FAILED TO DO WHAT WAS NORMALLY EXPECTED FROM YOU BECAUSE OF DRINKING: NEVER
HOW OFTEN DURING THE LAST YEAR HAVE YOU FOUND THAT YOU WERE NOT ABLE TO STOP DRINKING ONCE YOU HAD STARTED: 0
HAVE YOU OR SOMEONE ELSE BEEN INJURED AS A RESULT OF YOUR DRINKING: NO

## 2021-09-06 ASSESSMENT — PATIENT HEALTH QUESTIONNAIRE - PHQ9
SUM OF ALL RESPONSES TO PHQ QUESTIONS 1-9: 0
2. FEELING DOWN, DEPRESSED OR HOPELESS: 0
1. LITTLE INTEREST OR PLEASURE IN DOING THINGS: 0
SUM OF ALL RESPONSES TO PHQ9 QUESTIONS 1 & 2: 0
SUM OF ALL RESPONSES TO PHQ QUESTIONS 1-9: 0
SUM OF ALL RESPONSES TO PHQ QUESTIONS 1-9: 0

## 2021-09-08 ENCOUNTER — OFFICE VISIT (OUTPATIENT)
Dept: DERMATOLOGY | Age: 66
End: 2021-09-08
Payer: MEDICARE

## 2021-09-08 VITALS — TEMPERATURE: 97.4 F

## 2021-09-08 DIAGNOSIS — Z86.018 HISTORY OF DYSPLASTIC NEVUS: ICD-10-CM

## 2021-09-08 DIAGNOSIS — Z85.828 HISTORY OF BASAL CELL CANCER: ICD-10-CM

## 2021-09-08 DIAGNOSIS — L82.1 SEBORRHEIC KERATOSES: ICD-10-CM

## 2021-09-08 DIAGNOSIS — D22.9 BENIGN NEVUS: Primary | ICD-10-CM

## 2021-09-08 PROCEDURE — 99213 OFFICE O/P EST LOW 20 MIN: CPT | Performed by: DERMATOLOGY

## 2021-09-08 NOTE — PROGRESS NOTES
[facility]) Dermatology  [address]  1955    78 y.o. female     Date of Visit: 9/8/2021    Chief Complaint:   Chief Complaint   Patient presents with    Skin Lesion     6 mo FSE      HX: neoplasm/skin        I was asked to see this patient by Dr. Carrington ref. provider found. History of Present Illness:  1. Total-body skin exam    Multiple nevi-stable in size, shape, color. Has not noticed any new or changing pigmented lesions. \    Increasing number of seborrheic keratoses torso-increasing in size and number but asymptomatic. Not itching, bleeding. Patient does monitor her skin for change    Skin history:  7/14 basal cell carcinoma nose [facility], status post Mohs surgery and forehead flap  11/15 right upper paraspinal back dysplastic nevus, mild, saucerization  11/15 right posterior shoulder dysplastic nevus, mild, extended the biopsy margins on initial biopsy- re-saucerization  4/16 right mid paraspinal back dysplastic nevus, mild  4/16 right anterior shoulder dysplastic nevus, mild  4/16 left mid upper arm dysplastic nevus, mild  3/17 nodular basal cell carcinoma left posterior upper arm status post curettage  11/17 right upper paraspinal back dysplastic nevus with moderate dysplasia  9/18 right lower back dysplastic nevus with mild dysplasia  3/19 left mid lateral back irritated compound nevus, recurrent 9/19 9/19 basal cell carcinoma right lower eyelid status post Mohs and oculoplastics reconstruction   10/20 basal cell carcinoma central chest status post curettage     AK- LN2     Rosacea- Metrocream .75% 11/17-not helpful-uses Elidel 1% cream as needed 9/20       Review of Systems:  Constitutional: Reports general sense of well-being       Past Medical History, Surgical History, Family History, Medications and Allergies reviewed.     Social History:   Social History     Socioeconomic History    Marital status:      Spouse name: Not papules. Multiple slightly darker nevi-torso, right arm, left medial lower leg, right distal anterior thigh  Multiple hyperkeratotic stuck on papules and plaques torso      Assessment and Plan     1. Benign nevus - Benign acquired melanocytic nevi  -Recommend monthly self skin exams   -Educated regarding the ABCDEs of melanoma detection   -Call for any new/changing moles or concerning lesions  -Reviewed sun protective behavior -- sun avoidance during the peak hours of the day, sun-protective clothing (including hat and sunglasses), sunscreen use (water resistant, broad spectrum, SPF at least 30, need for reapplication every 2 to 3 hours), avoidance of tanning beds        2. Seborrheic keratoses - Discussed underlying nature of seborrheic keratosis and low risk of malignancy. Treatment reserved for lesions that are itching, bleeding, growing or otherwise becoming bothersome. Discussed monitoring for change with reevaluation for changing lesions. 3. History of dysplastic nevus-monitor for change   4. History of basal cell cancer - No evidence of recurrence. Discussed risk of subsequent skin cancers and increased risk of melanoma. Reviewed importance of monitoring skin for change and sun protection with hats and sunscreen, as well as sun avoidance.

## 2021-09-13 ENCOUNTER — VIRTUAL VISIT (OUTPATIENT)
Dept: FAMILY MEDICINE CLINIC | Age: 66
End: 2021-09-13
Payer: MEDICARE

## 2021-09-13 DIAGNOSIS — Z13.220 LIPID SCREENING: ICD-10-CM

## 2021-09-13 DIAGNOSIS — Z12.11 COLON CANCER SCREENING: ICD-10-CM

## 2021-09-13 DIAGNOSIS — Z13.1 DIABETES MELLITUS SCREENING: ICD-10-CM

## 2021-09-13 DIAGNOSIS — I48.0 PAF (PAROXYSMAL ATRIAL FIBRILLATION) (HCC): ICD-10-CM

## 2021-09-13 DIAGNOSIS — Z00.00 ROUTINE GENERAL MEDICAL EXAMINATION AT A HEALTH CARE FACILITY: Primary | ICD-10-CM

## 2021-09-13 DIAGNOSIS — M81.0 POST-MENOPAUSAL OSTEOPOROSIS: ICD-10-CM

## 2021-09-13 PROCEDURE — G0439 PPPS, SUBSEQ VISIT: HCPCS | Performed by: NURSE PRACTITIONER

## 2021-09-13 NOTE — PROGRESS NOTES
Medicare Annual Wellness Visit  Name: Meghan Smith Date: 2021   MRN: [id number] Sex: Female   Age: 78 y.o. Ethnicity: Non- / Non    : 1955 Race: White (non-)      Willis Garcia is here for Medicare AWV    Screenings for behavioral, psychosocial and functional/safety risks, and cognitive dysfunction are all negative except as indicated below. These results, as well as other patient data from the [address] form, are documented in Flowsheets linked to this Encounter. No Known Allergies    Prior to Visit Medications    Medication Sig Taking? Authorizing Provider   dilTIAZem (CARDIZEM CD) 180 MG extended release capsule Take 1 capsule by mouth daily Yes Bambi Escalera MD   apixaban (ELIQUIS) 5 MG TABS tablet Take 1 tablet by mouth 2 times daily Yes Bambi Escalera MD   Multiple Vitamins-Minerals (MULTIVITAMIN ADULT PO) Take 1 tablet by mouth daily Yes Historical Provider, MD   aspirin (ASPIRIN CHILDRENS) 81 MG chewable tablet Take 1 tablet by mouth daily. Yes Aylin Matthew MD   pimecrolimus (ELIDEL) 1 % cream Apply topically 2 times daily. Patient taking differently: as needed Apply topically 2 times daily.   Tatiana Garcia MD   TRIAMCINOLONE ACETONIDE EX Apply topically every 14 days   Historical Provider, MD       Past Medical History:   Diagnosis Date    Atrial flutter, paroxysmal ([address]     Dr Zuñiga= post viral infection no sx 2015    BCC (basal cell carcinoma), face     Broken arm 2018    right arm       Past Surgical History:   Procedure Laterality Date    COSMETIC SURGERY      On nose    MOHS SURGERY  2019    SKIN CANCER EXCISION  2014    BCC left nostril       Family History   Problem Relation Age of Onset    Arrhythmia Mother 80        a fib    Cancer Father          brain tumor / glioblasotma    High Blood Pressure Neg Hx     High Cholesterol Neg Hx        CareTeam (Including outside providers/suppliers regularly involved in providing care):   Patient Care Team:  BRANDAN Palmer CNP as PCP - General (Family Nurse Practitioner)  BRANDAN Palmer CNP as PCP - [facility] Empaneled Provider  Kyra Weiss MD as Consulting Physician (Electrophysiology)  Tamra Chi MD as Consulting Physician (Dermatology)  Debra Handy MD as Consulting Physician (Ophthalmology)    Wt Readings from Last 3 Encounters:   08/05/21 130 lb (59 kg)   06/14/21 135 lb (61.2 kg)   03/01/21 133 lb (60.3 kg)     Patient-Reported Vitals 9/13/2021   Patient-Reported Weight 130 LBS   Patient-Reported Height -   Patient-Reported Systolic 051   Patient-Reported Diastolic 65   Patient-Reported Pulse 78   Patient-Reported Temperature 98.2      There is no height or weight on file to calculate BMI. Based upon direct observation of the patient, evaluation of cognition reveals recent and remote memory intact. Patient's complete Health Risk Assessment and screening values have been reviewed and are found in Flowsheets. The following problems were reviewed today and where indicated follow up appointments were made and/or referrals ordered. Positive Risk Factor Screenings with Interventions:          General Health and ACP:  General  In general, how would you say your health is?: Very Good  In the past 7 days, have you experienced any of the following?  New or Increased Pain, New or Increased Fatigue, Loneliness, Social Isolation, Stress or Anger?: None of These  Do you get the social and emotional support that you need?: Yes  Do you have a Living Will?: Yes  Advance Directives     Power of [address] [place] [address] Will ACP-Advance Directive ACP-Power of     Not on File Not on File Not on File Not on File      General Health Risk Interventions:  · INSTRUCTED TO BRING COPY OF LIVING WILL TO OFFICE     Hearing/Vision:  No exam data present  Hearing/Vision  Do you or your family notice any trouble with your hearing that hasn't been managed with hearing aids?: No  Do you have difficulty driving, watching TV, or doing any of your daily activities because of your eyesight?: No  Have you had an eye exam within the past year?: (!) No  Hearing/Vision Interventions:  · Vision concerns:  patient encouraged to make appointment with his/her eye specialist    Safety:  Safety  Do you have working smoke detectors?: Yes  Have all throw rugs been removed or fastened?: Yes  Do you have non-slip mats or surfaces in all bathtubs/showers?: (!) No  Do all of your stairways have a railing or banister?: Yes  Are your doorways, halls and stairs free of clutter?: Yes  Do you always fasten your seatbelt when you are in a car?: Yes  Safety Interventions:  · Patient declines any further evaluation/treatment for this issue     Personalized Preventive Plan   Current Health Maintenance Status  Immunization History   Administered Date(s) Administered    COVID-19, Weeks Peter, PF, 30mcg/0.3mL 03/02/2021, 03/23/2021        Health Maintenance   Topic Date Due    DTaP/Tdap/Td vaccine (1 - Tdap) Never done    Lipid screen  Never done    Colon cancer screen colonoscopy  Never done    Shingles Vaccine (1 of 2) Never done    DEXA (modify frequency per FRAX score)  Never done    Pneumococcal 65+ years Vaccine (1 of 1 - PPSV23) Never done    COVID-19 Vaccine (3 - Pfizer risk 3-dose series) 04/20/2021    Annual Wellness Visit (AWV)  04/22/2021    Flu vaccine (1) Never done    Breast cancer screen  08/11/2023    Hepatitis A vaccine  Aged Out    Hepatitis B vaccine  Aged Out    Hib vaccine  Aged Out    Meningococcal (ACWY) vaccine  Aged Out    Hepatitis C screen  Discontinued     Recommendations for Can'tWait Due: see orders and patient instructions/AVS.  .   Recommended screening schedule for the next 5-10 years is provided to the patient in written form: see Patient Laurel Mckinney was seen today for medicare awv.    Diagnoses and all orders for this visit:    Routine general medical examination at a health care facility. AWV  - Reviewed medical and social history together. Discussed wide range of preventive recommendations for Medicare population, including fall prevention, exercise, recommendations for healthy produce-based diet, vaccines and routine screening tests, addressing all care gaps. SHINGLES - PNEUMOCOCCAL - FLU VACCINE DECLINED AT THIS TIME  ADVISED PT THAT THE SHINGLES VACCINE WILL BE FREE AT THE PHARMACY  WILL FIND OUT IF SHE CAN GET BOOSTER /COVID AT [facility]         PAF (paroxysmal atrial fibrillation) (HCC)  CONTINUE ELIQUIS AS PRESCRIBED   FOLLOW UP WITH CARDIOLOGY  Post-menopausal osteoporosis  -     DEXA BONE DENSITY AXIAL SKELETON; Future    Colon cancer screening  -     Cologuard (For External Results Only); Future    Lipid screening  -     Lipid Panel; Future    Diabetes mellitus screening  -     Comprehensive Metabolic Panel; Future        Talisha Lopez is a 78 y.o. female being evaluated by a Virtual Visit (video and audio) encounter to address concerns as mentioned above. A caregiver was present when appropriate. Due to this being a TeleHealth encounter (During ISBSX-38 public health emergency), evaluation of the following organ systems was limited: Vitals/Constitutional/EENT/Resp/CV/GI//MS/Neuro/Skin/Heme-Lymph-Imm. Pursuant to the emergency declaration under the [facility], [address] [place] [address] authority and the [employer] Act, this Virtual Visit was conducted with patient's (and/or legal guardian's) consent, to reduce the patient's risk of exposure to COVID-19 and provide necessary medical care. The patient (and/or legal guardian) has also been advised to contact this office for worsening conditions or problems, and seek emergency medical treatment and/or call 911 if deemed necessary.      Patient identification was verified at the start of the visit: Yes    Services were provided through a video synchronous discussion virtually to substitute for in-person clinic visit. Patient and provider were located at their individual homes. --BRANDAN Burrell CNP on 9/13/2021 at 10:04 AM    An electronic signature was used to authenticate this note.

## 2021-09-13 NOTE — PATIENT INSTRUCTIONS
Personalized Preventive Plan for Maria E Loo - 9/13/2021  Medicare offers a range of preventive health benefits. Some of the tests and screenings are paid in full while other may be subject to a deductible, co-insurance, and/or copay. Some of these benefits include a comprehensive review of your medical history including lifestyle, illnesses that may run in your family, and various assessments and screenings as appropriate. After reviewing your medical record and screening and assessments performed today your provider may have ordered immunizations, labs, imaging, and/or referrals for you. A list of these orders (if applicable) as well as your Preventive Care list are included within your After Visit Summary for your review. Other Preventive Recommendations:    · A preventive eye exam performed by an eye specialist is recommended every 1-2 years to screen for glaucoma; cataracts, macular degeneration, and other eye disorders. · A preventive dental visit is recommended every 6 months. · Try to get at least 150 minutes of exercise per week or 10,000 steps per day on a pedometer . · Order or download the FREE \"Exercise & Physical Activity: Your Everyday Guide\" from [employer]. Call 5-525.961.2610 or search [employer] online. · You need 9537-0009 mg of calcium and 9387-6006 IU of vitamin D per day. It is possible to meet your calcium requirement with diet alone, but a vitamin D supplement is usually necessary to meet this goal.  · When exposed to the sun, use a sunscreen that protects against both UVA and UVB radiation with an SPF of 30 or greater. Reapply every 2 to 3 hours or after sweating, drying off with a towel, or swimming. · Always wear a seat belt when traveling in a car. Always wear a helmet when riding a bicycle or motorcycle.

## 2021-09-23 ENCOUNTER — NURSE ONLY (OUTPATIENT)
Dept: FAMILY MEDICINE CLINIC | Age: 66
End: 2021-09-23
Payer: MEDICARE

## 2021-09-23 DIAGNOSIS — Z13.1 DIABETES MELLITUS SCREENING: ICD-10-CM

## 2021-09-23 DIAGNOSIS — Z13.220 LIPID SCREENING: ICD-10-CM

## 2021-09-23 LAB
A/G RATIO: 1.7 (ref 1.1–2.2)
ALBUMIN SERPL-MCNC: 4.4 G/DL (ref 3.4–5)
ALP BLD-CCNC: 94 U/L (ref 40–129)
ALT SERPL-CCNC: 23 U/L (ref 10–40)
ANION GAP SERPL CALCULATED.3IONS-SCNC: 13 MMOL/L (ref 3–16)
AST SERPL-CCNC: 31 U/L (ref 15–37)
BILIRUB SERPL-MCNC: 0.7 MG/DL (ref 0–1)
BUN BLDV-MCNC: 10 MG/DL (ref 7–20)
CALCIUM SERPL-MCNC: 9.5 MG/DL (ref 8.3–10.6)
CHLORIDE BLD-SCNC: 99 MMOL/L (ref 99–110)
CHOLESTEROL, TOTAL: 233 MG/DL (ref 0–199)
CO2: 26 MMOL/L (ref 21–32)
CREAT SERPL-MCNC: 0.7 MG/DL (ref 0.6–1.2)
GFR AFRICAN AMERICAN: >60
GFR NON-AFRICAN AMERICAN: >60
GLOBULIN: 2.6 G/DL
GLUCOSE BLD-MCNC: 82 MG/DL (ref 70–99)
HDLC SERPL-MCNC: 88 MG/DL (ref 40–60)
LDL CHOLESTEROL CALCULATED: 126 MG/DL
POTASSIUM SERPL-SCNC: 4.8 MMOL/L (ref 3.5–5.1)
SODIUM BLD-SCNC: 138 MMOL/L (ref 136–145)
TOTAL PROTEIN: 7 G/DL (ref 6.4–8.2)
TRIGL SERPL-MCNC: 95 MG/DL (ref 0–150)
VLDLC SERPL CALC-MCNC: 19 MG/DL

## 2021-09-23 PROCEDURE — 36415 COLL VENOUS BLD VENIPUNCTURE: CPT | Performed by: NURSE PRACTITIONER

## 2021-11-04 NOTE — PROGRESS NOTES
[employer] MESSAGE SENT TO PT, PT DUE FOR DEXA SCAN.   [facility]

## 2021-11-15 ENCOUNTER — PATIENT MESSAGE (OUTPATIENT)
Dept: CARDIOLOGY CLINIC | Age: 66
End: 2021-11-15

## 2021-11-15 NOTE — TELEPHONE ENCOUNTER
Pt calling regarding her My Chart message. Pt stated she has also been experiencing slight CP. CP is @ a 2 on a 1-10 scale. Please advise. Pt can be reached @ [phone].

## 2021-11-16 ENCOUNTER — TELEPHONE (OUTPATIENT)
Dept: CARDIOLOGY CLINIC | Age: 66
End: 2021-11-16

## 2021-11-16 NOTE — TELEPHONE ENCOUNTER
Please let patient know I am sorry to hear she isn't feeling well. We can try increasing the Cardizem to see if this offers more heart rate control. Please ask her if she felt as if her HR were irregular when elevated. Thanks. Unable to assess due to medical condition

## 2021-11-16 NOTE — TELEPHONE ENCOUNTER
Fatmata Walsh  to Annelise CollinsJuly mg, BRANDAN - CNP          2:04 PM  July Pichardo,     It seems the generic Diltiazem 24H ER(CD) 180 mg CP may not be working as well as it had been. Around the beginning of October, I refilled the medicine and the capsules were a different color. I assumed they were from a different . I've checked my pulse at different times of the day and night, and for the last couple of weeks it seems to be in the 90's or higher. Right now it is 108 and I have been sitting in a chair. Do you have any suggestions?     Thank you,  Evangelina Loo         2:27 PM  Note     Pt calling regarding her My Chart message. Pt stated she has also been experiencing slight CP. CP is @ a 2 on a 1-10 scale. Please advise. Pt can be reached @ [phone].                           July Malmedahl, APRN - CNP  to Me  [facility] Staff    AM    9:10 AM  Note     Please let patient know I am sorry to hear she isn't feeling well. We can try increasing the Cardizem to see if this offers more heart rate control. Please ask her if she felt as if her HR were irregular when elevated. Thanks. Me     CD    10:01 AM  Note     July - patient states her HR is 108-120 at rest. I asked if she thought her heart felt irregular, she stated she feels palpitations. I did ask if she knew her heart rate when she was up and doing things, she stated she did not. She states she walks several times a week, and the last few weeks she tires easily.

## 2021-11-17 ENCOUNTER — OFFICE VISIT (OUTPATIENT)
Dept: CARDIOLOGY CLINIC | Age: 66
End: 2021-11-17
Payer: MEDICARE

## 2021-11-17 VITALS
HEART RATE: 86 BPM | SYSTOLIC BLOOD PRESSURE: 108 MMHG | HEIGHT: 65 IN | WEIGHT: 132 LBS | OXYGEN SATURATION: 93 % | DIASTOLIC BLOOD PRESSURE: 66 MMHG | BODY MASS INDEX: 21.99 KG/M2

## 2021-11-17 DIAGNOSIS — I48.0 PAF (PAROXYSMAL ATRIAL FIBRILLATION) (HCC): Primary | ICD-10-CM

## 2021-11-17 DIAGNOSIS — I48.0 PAROXYSMAL ATRIAL FIBRILLATION (HCC): ICD-10-CM

## 2021-11-17 DIAGNOSIS — I48.92 ATRIAL FLUTTER, PAROXYSMAL (HCC): ICD-10-CM

## 2021-11-17 PROCEDURE — 93000 ELECTROCARDIOGRAM COMPLETE: CPT | Performed by: NURSE PRACTITIONER

## 2021-11-17 PROCEDURE — 99214 OFFICE O/P EST MOD 30 MIN: CPT | Performed by: NURSE PRACTITIONER

## 2021-11-17 RX ORDER — DILTIAZEM HYDROCHLORIDE 240 MG/1
240 CAPSULE, COATED, EXTENDED RELEASE ORAL DAILY
Qty: 30 CAPSULE | Refills: 3 | Status: SHIPPED
Start: 2021-11-17 | End: 2021-11-19 | Stop reason: SINTOL

## 2021-11-17 NOTE — PROGRESS NOTES
[facility]   Electrophysiology Outpatient Note              Date:  November 17, 2021  Patient name: Derik Head  YOB: 1955    Primary Care physician: BRANDAN Rea CNP    HISTORY OF PRESENT ILLNESS: The patient is a 78 y.o.  female with a history of AF, AFL, MVP, mild MR, and basal cell carcinoma. In 2014, she was admitted at [facility] for atrial flutter. In 2/2015, She wore an event monitor that showed several brief episodes of atrial flutter. In 2/2021, she was evaluated in the ED for lightheadedness, palpitations and nausea. ECG monitoring in the ER showed SR. She wore an event monitor from 3/1/2021 to 3/30/2021 that showed SR with PAF burden of 5%. Today she is being seen for PAF and AFL. EKG shows AF with a HR of 113 bpm. She reports that she believes she has been in AF for about three weeks. Heart rates have been between 100-120 bpm. She reports that in October, pharmacy filled her diltiazem prescription with a different generic form. She noticed the AF after the change. Despite elevated HR, she feels well. Denies chest pain, shortness of breath, and dizziness. Past Medical History:   has a past medical history of Atrial flutter, paroxysmal ([address] BCC (basal cell carcinoma), face, and Broken arm. Past Surgical History:   has a past surgical history that includes Skin cancer excision (2014); Cosmetic surgery (2014); and Mohs surgery (12/09/2019). Home Medications:    Prior to Admission medications    Medication Sig Start Date End Date Taking? Authorizing Provider   dilTIAZem (CARDIZEM CD) 180 MG extended release capsule Take 1 capsule by mouth daily 4/6/21  Yes Augustina Mendoza MD   apixaban (ELIQUIS) 5 MG TABS tablet Take 1 tablet by mouth 2 times daily 4/6/21  Yes Augustina Mendoza MD   pimecrolimus (ELIDEL) 1 % cream Apply topically 2 times daily.   Patient taking differently: as needed Apply topically 2 times daily. 9/14/20  Yes Pecolia Mortimer, MD   Multiple Vitamins-Minerals (MULTIVITAMIN ADULT PO) Take 1 tablet by mouth daily   Yes Historical Provider, MD   TRIAMCINOLONE ACETONIDE EX Apply topically every 14 days    Yes Historical Provider, MD   aspirin (ASPIRIN CHILDRENS) 81 MG chewable tablet Take 1 tablet by mouth daily. 2/14/14  Yes Jonah Scott MD       Allergies:  Patient has no known allergies. Social History:   reports that she has never smoked. She has never used smokeless tobacco. She reports current alcohol use. She reports that she does not use drugs. Family History: family history includes Arrhythmia (age of onset: 80) in her mother; Cancer in her father. All 14 point review of systems are completed and pertinent positives are mentioned in the history of present illness. Other systems are reviewed and are negative. PHYSICAL EXAM:    Vital signs:    /66   Pulse 86   Ht 5' 4.5\" (1.638 m)   Wt 132 lb (59.9 kg)   SpO2 93%   BMI 22.31 kg/m²      Constitutional and general appearance: alert, cooperative, no distress and appears stated age  HEENT: PERRL, no cervical lymphadenopathy. No masses palpable.  Normal oral mucosa  Respiratory:  · Normal excursion and expansion without use of accessory muscles  · Resp auscultation: Normal breath sounds without wheezing, rhonchi, and rales  Cardiovascular:  · The apical impulse is not displaced  · Heart tones are crisp and normal. Irregular S1 and S2.  · Jugular venous pulsation Normal  · The carotid upstroke is normal in amplitude and contour without delay or bruit  · Peripheral pulses are symmetrical and full   Abdomen:  · No masses or tenderness  · Bowel sounds present  Extremities:  ·  No cyanosis or clubbing  ·  No lower extremity edema  ·  Skin: warm and dry  Neurological:  · Alert and oriented  · Moves all extremities well  · No abnormalities of mood, affect, memory, mentation, or behavior are noted    DATA:    ECG 11/17/2021   bpm Echo 2/2014:  Summary    -Normal left ventricle size, wall thickness and systolic function with an    estimated ejection fraction of 60%.  -Mild mitral regurgitation is present.    -There is mild tricuspid regurgitation with RVSP estimated at 31 mmHg. All labs and testing reviewed. CARDIOLOGY LABS:   CBC: No results for input(s): WBC, HGB, HCT, PLT in the last 72 hours. BMP: No results for input(s): NA, K, CO2, BUN, CREATININE, LABGLOM, GLUCOSE in the last 72 hours. PT/INR: No results for input(s): PROTIME, INR in the last 72 hours. APTT:No results for input(s): APTT in the last 72 hours. FASTING LIPID PANEL:  Lab Results   Component Value Date    HDL 88 09/23/2021    LDLCALC 126 09/23/2021    TRIG 95 09/23/2021     LIVER PROFILE:No results for input(s): AST, ALT, ALB in the last 72 hours. IMPRESSION:    Patient Active Problem List   Diagnosis    Atrial flutter, paroxysmal (HCC)    Mitral regurgitation    MVP (mitral valve prolapse)    Closed fracture of right proximal humerus    Basal cell carcinoma (BCC) of right lower eyelid    PAF (paroxysmal atrial fibrillation) (HCC)    Restless leg syndrome    FABIAN (obstructive sleep apnea)       Assessment:   Atrial fibrillation of unknown duration: stable   -rates mildly elevated   -[id number] score 2 (age, gender)   Paroxsymal atrial flutter: stable   Mitral valve prolapse  Mild mitral regurgitation   Basal cell carcinoma s/p MOHS procedure 2019      Plan:   Increase Cardizem to 240 mg PO daily for increased rate control   Continue Eliquis  Update echocardiogram   If AF is persistent with difficult to control rates, recommend antiarrhythmic therapy after echocardiogram has been updated. Monitor BP and HR at home and call if consistently out of goal ranges.  Ideally, HR will remain consistently below 100 bpm.   Follow up in December as planned      ERICK Dyer, APRN-CNP  [facility]  [phone]

## 2021-11-17 NOTE — PATIENT INSTRUCTIONS
Increase Cardizem to 240 mg PO daily for increased rate control     Continue Eliquis    Update echocardiogram. Call [phone] to schedule. If AF is persistent with difficult to control rates, recommend antiarrhythmic therapy after echocardiogram has been updated.      Monitor BP and HR at home and call if consistently out of goal ranges    Follow up in December

## 2021-11-19 ENCOUNTER — TELEPHONE (OUTPATIENT)
Dept: CARDIOLOGY CLINIC | Age: 66
End: 2021-11-19

## 2021-11-19 DIAGNOSIS — I48.0 PAROXYSMAL ATRIAL FIBRILLATION (HCC): ICD-10-CM

## 2021-11-19 RX ORDER — DILTIAZEM HYDROCHLORIDE 240 MG/1
240 CAPSULE, EXTENDED RELEASE ORAL DAILY
Qty: 30 CAPSULE | Refills: 3 | Status: SHIPPED | OUTPATIENT
Start: 2021-11-19 | End: 2022-02-11

## 2021-11-19 NOTE — TELEPHONE ENCOUNTER
Pt was informed to call NPAM with an update on her condition today since increasing the diltiazem. Pt stated she seems to be feel about the same. She stated when she picked up the new prescription from the pharmacy she noticed it is the same  that caused other problems in the past. She stated when she was taking Cartia in the past it was made by a different  that did not give her any issues. Pt is asking if she can get a brand name script for Cartia. But  Pt is requesting to speak directly with NP.  Please call pt [phone].

## 2021-11-19 NOTE — TELEPHONE ENCOUNTER
Spoke with pt relayed message form NPAM. Pt will call back in about a week with heart rate recording.

## 2021-11-22 NOTE — TELEPHONE ENCOUNTER
If HR's are overall < 100 bpm at rest, I am satisfied. She should continue [place]. We can discuss additional treatment options at 12/2021 appointment.

## 2021-11-22 NOTE — TELEPHONE ENCOUNTER
Pt was told to call in a week to report how her HR has been. HR has been in the 90's and has been as high as 113 today. Pt reports BP has been fine. Today BP was 111/70, 103/73. Pt stated other than her HR being high she does not have any symptoms. Please advise.  Pt can be reached @ [phone]

## 2021-11-23 ENCOUNTER — NURSE ONLY (OUTPATIENT)
Dept: CARDIOLOGY CLINIC | Age: 66
End: 2021-11-23
Payer: MEDICARE

## 2021-11-23 ENCOUNTER — TELEPHONE (OUTPATIENT)
Dept: CARDIOLOGY CLINIC | Age: 66
End: 2021-11-23

## 2021-11-23 DIAGNOSIS — I48.0 PAF (PAROXYSMAL ATRIAL FIBRILLATION) (HCC): Primary | ICD-10-CM

## 2021-11-23 PROCEDURE — 93000 ELECTROCARDIOGRAM COMPLETE: CPT | Performed by: INTERNAL MEDICINE

## 2021-11-23 NOTE — TELEPHONE ENCOUNTER
Pt stopped in for EKG as advised by LIVIA. Pt has had recent med changes,she feel that they are not working.

## 2021-11-23 NOTE — TELEPHONE ENCOUNTER
Spoke with the patient and advised her of the message below. She voiced understanding .  Call complete

## 2021-11-23 NOTE — TELEPHONE ENCOUNTER
Pt called and NPAM message given. Pt wants to know what she should do it HR at rest is over 100 BPM. Please advise.

## 2021-11-23 NOTE — TELEPHONE ENCOUNTER
LIVIA - patient will be going to the  location shortly for an EKG. Her resting HR has been 110/118. She was told if this happens again to come in for an EKG.

## 2021-11-23 NOTE — TELEPHONE ENCOUNTER
This is no different from the ECG of 11/17/21.  She would benefit from the addition of Toprol, 25 mg q am.

## 2021-11-24 RX ORDER — METOPROLOL SUCCINATE 25 MG/1
25 TABLET, EXTENDED RELEASE ORAL DAILY
Qty: 90 TABLET | Refills: 1 | Status: SHIPPED | OUTPATIENT
Start: 2021-11-24 | End: 2022-02-11 | Stop reason: SDUPTHER

## 2021-11-24 NOTE — TELEPHONE ENCOUNTER
I spoke with pt and relayed message per [facility]. I sent new rx and gave instructions. Pt verbalized understanding.

## 2021-11-30 ENCOUNTER — HOSPITAL ENCOUNTER (OUTPATIENT)
Dept: NON INVASIVE DIAGNOSTICS | Age: 66
Discharge: HOME | End: 2021-11-30
Payer: MEDICARE

## 2021-11-30 LAB
LV EF: 58 %
LVEF MODALITY: NORMAL

## 2021-11-30 PROCEDURE — 93306 TTE W/DOPPLER COMPLETE: CPT

## 2021-11-30 NOTE — RESULT ENCOUNTER NOTE
Please let patient know that her echocardiogram overall looks good. Her mitral regurgitation (leaky heart valve) has progressed from mild to moderate and we will continue to monitor this with annual echos.

## 2021-12-03 ENCOUNTER — TELEPHONE (OUTPATIENT)
Dept: CARDIOLOGY CLINIC | Age: 66
End: 2021-12-03

## 2021-12-03 NOTE — TELEPHONE ENCOUNTER
----- Message from BRANDAN Wilson CNP sent at 11/30/2021  4:42 PM EST -----  Please let patient know that her echocardiogram overall looks good. Her mitral regurgitation (leaky heart valve) has progressed from mild to moderate and we will continue to monitor this with annual echos.

## 2021-12-07 NOTE — PROGRESS NOTES
[facility]   Electrophysiology Outpatient Note              Date:  December 9, 2021  Patient name: Marcos Freeman  YOB: 1955    Primary Care physician: BRANDAN Wood CNP    HISTORY OF PRESENT ILLNESS: The patient is a 78 y.o.  female with a history of AF, AFL, MVP, mild MR, and basal cell carcinoma. In 2014, she was admitted at [facility] for atrial flutter. In 2/2015, She wore an event monitor that showed several brief episodes of atrial flutter. In 2/2021, she was evaluated in the ED for lightheadedness, palpitations and nausea. ECG monitoring in the ER showed SR. She wore an event monitor from 3/1/2021 to 3/30/2021 that showed SR with PAF burden of 5%. Today she is being seen for PAF and AFL. EKG shows AF with a HR of 90. She reports occasional shortness of breath. Has been checking her heart rate, it is typically 80-90 bpm. She is able exercise without difficulty. Denies chest pain, palpitations,and dizziness. Past Medical History:   has a past medical history of Atrial flutter, paroxysmal ([address] BCC (basal cell carcinoma), face, and Broken arm. Past Surgical History:   has a past surgical history that includes Skin cancer excision (2014); Cosmetic surgery (2014); and Mohs surgery (12/09/2019). Home Medications:    Prior to Admission medications    Medication Sig Start Date End Date Taking? Authorizing Provider   metoprolol succinate (TOPROL XL) 25 MG extended release tablet Take 1 tablet by mouth daily 11/24/21  Yes Sven Simmons MD   CARTIA  MG extended release capsule Take 1 capsule by mouth daily Please dispense Cartia. Do not substitute with generic diltiazem. 11/19/21  Yes BRANDAN Saldivar CNP   apixaban (ELIQUIS) 5 MG TABS tablet Take 1 tablet by mouth 2 times daily 4/6/21  Yes Sven Simmons MD   pimecrolimus (ELIDEL) 1 % cream Apply topically 2 times daily.   Patient taking differently: as needed Apply topically 2 times daily. 9/14/20  Yes Omari Aldridge MD   Multiple Vitamins-Minerals (MULTIVITAMIN ADULT PO) Take 1 tablet by mouth daily   Yes Historical Provider, MD   TRIAMCINOLONE ACETONIDE EX Apply topically every 14 days    Yes Historical Provider, MD   aspirin (ASPIRIN CHILDRENS) 81 MG chewable tablet Take 1 tablet by mouth daily. 2/14/14  Yes Gerald Reyes MD       Allergies:  Patient has no known allergies. Social History:   reports that she has never smoked. She has never used smokeless tobacco. She reports current alcohol use. She reports that she does not use drugs. Family History: family history includes Arrhythmia (age of onset: 80) in her mother; Cancer in her father. All 14 point review of systems are completed and pertinent positives are mentioned in the history of present illness. Other systems are reviewed and are negative. PHYSICAL EXAM:    Vital signs:    BP 98/60   Pulse 90   Ht 5' 4.5\" (1.638 m)   Wt 134 lb (60.8 kg)   SpO2 96%   BMI 22.65 kg/m²      Constitutional and general appearance: alert, cooperative, no distress and appears stated age  HEENT: PERRL, no cervical lymphadenopathy. No masses palpable.  Normal oral mucosa  Respiratory:  · Normal excursion and expansion without use of accessory muscles  · Resp auscultation: Normal breath sounds without wheezing, rhonchi, and rales  Cardiovascular:  · The apical impulse is not displaced  · Heart tones are crisp and normal. Irregular S1 and S2.  · Jugular venous pulsation Normal  · The carotid upstroke is normal in amplitude and contour without delay or bruit  · Peripheral pulses are symmetrical and full   Abdomen:  · No masses or tenderness  · Bowel sounds present  Extremities:  ·  No cyanosis or clubbing  ·  No lower extremity edema  ·  Skin: warm and dry  Neurological:  · Alert and oriented  · Moves all extremities well  · No abnormalities of mood, affect, memory, mentation, or behavior are noted    DATA:    ECG 12/9/2021  AF 90 bpm      Echo 2/2014:  Summary    -Normal left ventricle size, wall thickness and systolic function with an    estimated ejection fraction of 60%.  -Mild mitral regurgitation is present.    -There is mild tricuspid regurgitation with RVSP estimated at 31 mmHg. All labs and testing reviewed. CARDIOLOGY LABS:   CBC: No results for input(s): WBC, HGB, HCT, PLT in the last 72 hours. BMP: No results for input(s): NA, K, CO2, BUN, CREATININE, LABGLOM, GLUCOSE in the last 72 hours. PT/INR: No results for input(s): PROTIME, INR in the last 72 hours. APTT:No results for input(s): APTT in the last 72 hours. FASTING LIPID PANEL:  Lab Results   Component Value Date    HDL 88 09/23/2021    LDLCALC 126 09/23/2021    TRIG 95 09/23/2021     LIVER PROFILE:No results for input(s): AST, ALT, ALB in the last 72 hours. IMPRESSION:    Assessment:   Persistent atrial fibrillation: stable   -[id number] score 2 (age, gender)   Paroxsymal atrial flutter: stable   Mitral valve prolapse  Mild mitral regurgitation   Basal cell carcinoma s/p MOHS procedure 2019      Plan:   Continue Eliquis, Cartia, Toprol and Eliquis   CBC due 2/2022  BMP due 9/2022  Discussed additional treatment options for AF including antiarrythmic medications, RFCA and pacemaker with AVN ablation. Patient is satisfied with current treatment plan of rate control and stroke prophylaxis. Would like to wait on further treatment. Plan to revisit at next visit.    Follow up in 2 months       ERICK Najera, BRANDAN-CNP  [address]  [phone]

## 2021-12-09 ENCOUNTER — OFFICE VISIT (OUTPATIENT)
Dept: CARDIOLOGY CLINIC | Age: 66
End: 2021-12-09
Payer: MEDICARE

## 2021-12-09 VITALS
HEIGHT: 65 IN | SYSTOLIC BLOOD PRESSURE: 98 MMHG | HEART RATE: 90 BPM | OXYGEN SATURATION: 96 % | WEIGHT: 134 LBS | BODY MASS INDEX: 22.33 KG/M2 | DIASTOLIC BLOOD PRESSURE: 60 MMHG

## 2021-12-09 DIAGNOSIS — I48.92 ATRIAL FLUTTER, PAROXYSMAL (HCC): ICD-10-CM

## 2021-12-09 DIAGNOSIS — I34.1 MVP (MITRAL VALVE PROLAPSE): ICD-10-CM

## 2021-12-09 DIAGNOSIS — I48.0 PAF (PAROXYSMAL ATRIAL FIBRILLATION) (HCC): Primary | ICD-10-CM

## 2021-12-09 PROCEDURE — 99214 OFFICE O/P EST MOD 30 MIN: CPT | Performed by: NURSE PRACTITIONER

## 2021-12-09 PROCEDURE — 93000 ELECTROCARDIOGRAM COMPLETE: CPT | Performed by: NURSE PRACTITIONER

## 2021-12-09 RX ORDER — PROPAFENONE HYDROCHLORIDE 150 MG/1
150 TABLET, FILM COATED ORAL EVERY 8 HOURS
Qty: 90 TABLET | Refills: 3 | Status: SHIPPED
Start: 2021-12-09 | End: 2021-12-09 | Stop reason: CLARIF

## 2021-12-10 ENCOUNTER — TELEPHONE (OUTPATIENT)
Dept: CARDIOLOGY CLINIC | Age: 66
End: 2021-12-10

## 2021-12-10 NOTE — TELEPHONE ENCOUNTER
Pt sts she received notification that a new med was ready for . Pt declined to take additional medication and wants NPAM to call her pharmacy and cancel order. TY  See OV notes: Discussed additional treatment options for AF including antiarrythmic medications, RFCA and pacemaker with AVN ablation. Patient is satisfied with current treatment plan of rate control and stroke prophylaxis. Would like to wait on further treatment. Plan to revisit at next visit.

## 2021-12-17 ENCOUNTER — TELEPHONE (OUTPATIENT)
Dept: CARDIOLOGY CLINIC | Age: 66
End: 2021-12-17

## 2021-12-17 NOTE — TELEPHONE ENCOUNTER
[facility] called regarding the Cartia  mg prescription that was sent on 11/19/2021. The instructions say not to substitute with generic and [facility] cannot supply the brand name. Can Diltiazem be used instead?  If so please send new prescription to [facility] on [address] [facility] [address] @ [phone]

## 2021-12-20 RX ORDER — DILTIAZEM HYDROCHLORIDE 240 MG/1
240 CAPSULE, COATED, EXTENDED RELEASE ORAL DAILY
Qty: 30 CAPSULE | Refills: 3 | Status: SHIPPED | OUTPATIENT
Start: 2021-12-20 | End: 2022-02-11 | Stop reason: SDUPTHER

## 2021-12-20 NOTE — TELEPHONE ENCOUNTER
Please clarify with Santo Mcclellan whether or not she wants the name brand or not and pend Rx appropriately.

## 2021-12-30 ENCOUNTER — HOSPITAL ENCOUNTER (OUTPATIENT)
Dept: GENERAL RADIOLOGY | Age: 66
Discharge: HOME | End: 2021-12-30
Payer: MEDICARE

## 2021-12-30 DIAGNOSIS — M81.0 POST-MENOPAUSAL OSTEOPOROSIS: ICD-10-CM

## 2021-12-30 PROCEDURE — 77080 DXA BONE DENSITY AXIAL: CPT

## 2022-02-11 ENCOUNTER — OFFICE VISIT (OUTPATIENT)
Dept: CARDIOLOGY CLINIC | Age: 67
End: 2022-02-11
Payer: MEDICARE

## 2022-02-11 VITALS
OXYGEN SATURATION: 99 % | BODY MASS INDEX: 22.16 KG/M2 | HEIGHT: 65 IN | WEIGHT: 133 LBS | DIASTOLIC BLOOD PRESSURE: 60 MMHG | HEART RATE: 89 BPM | SYSTOLIC BLOOD PRESSURE: 98 MMHG

## 2022-02-11 DIAGNOSIS — I48.92 ATRIAL FLUTTER, PAROXYSMAL (HCC): Primary | ICD-10-CM

## 2022-02-11 DIAGNOSIS — I48.0 PAROXYSMAL ATRIAL FIBRILLATION (HCC): ICD-10-CM

## 2022-02-11 PROCEDURE — 93000 ELECTROCARDIOGRAM COMPLETE: CPT | Performed by: NURSE PRACTITIONER

## 2022-02-11 PROCEDURE — 99214 OFFICE O/P EST MOD 30 MIN: CPT | Performed by: NURSE PRACTITIONER

## 2022-02-11 RX ORDER — METOPROLOL SUCCINATE 25 MG/1
25 TABLET, EXTENDED RELEASE ORAL DAILY
Qty: 90 TABLET | Refills: 3
Start: 2022-02-11 | End: 2022-05-02

## 2022-02-11 RX ORDER — B-COMPLEX WITH VITAMIN C
1 TABLET ORAL 2 TIMES DAILY
COMMUNITY

## 2022-02-11 RX ORDER — DILTIAZEM HYDROCHLORIDE 240 MG/1
240 CAPSULE, COATED, EXTENDED RELEASE ORAL DAILY
Qty: 30 CAPSULE | Refills: 3 | Status: SHIPPED | OUTPATIENT
Start: 2022-02-11 | End: 2022-07-06 | Stop reason: SDUPTHER

## 2022-02-11 NOTE — PATIENT INSTRUCTIONS
No changes today    Can try the addition of an antiarrhythmic medication if you become symptomatic or rates become difficult to control. Please let me know if you would like to pursue.       Monitor HR at home and call if consistently out of goal ranges    Follow up inn 4 months

## 2022-02-11 NOTE — PROGRESS NOTES
[address]   Electrophysiology Outpatient Note              Date:  February 11, 2022  Patient name: La Mabry  YOB: 1955    Primary Care physician: BRANDAN Watson CNP    HISTORY OF PRESENT ILLNESS: The patient is a 78 y.o.  female with a history of AF, AFL, MVP, mild MR, and basal cell carcinoma. In 2014, she was admitted at [facility] for atrial flutter. In 2/2015, She wore an event monitor that showed several brief episodes of atrial flutter. In 2/2021, she was evaluated in the ED for lightheadedness, palpitations and nausea. ECG monitoring in the ER showed SR. She wore an event monitor from 3/1/2021 to 3/30/2021 that showed SR with PAF burden of 5%. Today she is being seen for PAF and AFL. EKG shows AF with a HR of 89. She has been feeling well. She walks daily. Average heart rates are in the 80's. Denies chest pain, palpitations, shortness of breath, and dizziness. Past Medical History:   has a past medical history of Atrial flutter, paroxysmal ([address] BCC (basal cell carcinoma), face, and Broken arm. Past Surgical History:   has a past surgical history that includes Skin cancer excision (2014); Cosmetic surgery (2014); and Mohs surgery (12/09/2019). Home Medications:    Prior to Admission medications    Medication Sig Start Date End Date Taking? Authorizing Provider   calcium carbonate-vitamin D 600-200 MG-UNIT TABS Take 1 tablet by mouth 2 times daily   Yes Historical Provider, MD   dilTIAZem (CARDIZEM CD) 240 MG extended release capsule Take 1 capsule by mouth daily 12/20/21  Yes BRANDAN Saldivar CNP   metoprolol succinate (TOPROL XL) 25 MG extended release tablet Take 1 tablet by mouth daily 11/24/21  Yes Desire Macario MD   CARTIA  MG extended release capsule Take 1 capsule by mouth daily Please dispense Cartia. Do not substitute with generic diltiazem.  11/19/21  Yes BRANDAN Saldivar CNP   apixaban (ELIQUIS) 5 MG TABS tablet Take 1 tablet by mouth 2 times daily 4/6/21  Yes Jason Torrez MD   pimecrolimus (ELIDEL) 1 % cream Apply topically 2 times daily. Patient taking differently: as needed Apply topically 2 times daily. 9/14/20  Yes Payton Phillips MD   Multiple Vitamins-Minerals (MULTIVITAMIN ADULT PO) Take 1 tablet by mouth daily   Yes Historical Provider, MD   TRIAMCINOLONE ACETONIDE EX Apply topically every 14 days    Yes Historical Provider, MD   aspirin (ASPIRIN CHILDRENS) 81 MG chewable tablet Take 1 tablet by mouth daily. 2/14/14  Yes Veda Caldwell MD       Allergies:  Patient has no known allergies. Social History:   reports that she has never smoked. She has never used smokeless tobacco. She reports current alcohol use. She reports that she does not use drugs. Family History: family history includes Arrhythmia (age of onset: 80) in her mother; Cancer in her father. All 14 point review of systems are completed and pertinent positives are mentioned in the history of present illness. Other systems are reviewed and are negative. PHYSICAL EXAM:    Vital signs:    BP 98/60   Pulse 89   Ht 5' 4.5\" (1.638 m)   Wt 133 lb (60.3 kg)   SpO2 99%   BMI 22.48 kg/m²      Constitutional and general appearance: alert, cooperative, no distress and appears stated age  HEENT: PERRL, no cervical lymphadenopathy. No masses palpable.  Normal oral mucosa  Respiratory:  · Normal excursion and expansion without use of accessory muscles  · Resp auscultation: Normal breath sounds without wheezing, rhonchi, and rales  Cardiovascular:  · The apical impulse is not displaced  · Heart tones are crisp and normal. Irregular S1 and S2.  · Jugular venous pulsation Normal  · The carotid upstroke is normal in amplitude and contour without delay or bruit  · Peripheral pulses are symmetrical and full   Abdomen:  · No masses or tenderness  · Bowel sounds present  Extremities:  ·  No cyanosis or clubbing  ·  No lower extremity edema  ·  Skin: warm and dry  Neurological:  · Alert and oriented  · Moves all extremities well  · No abnormalities of mood, affect, memory, mentation, or behavior are noted    DATA:    ECG 12/9/2021  AF 90 bpm      Echo 11/2021:      Summary   Normal left ventricle size, wall thickness, and systolic function with an   estimated ejection fraction of 55-60%. No regional wall motion abnormalities are seen. Normal diastolic function. Moderate mitral regurgitation. Mild aortic regurgitation. Mild to moderate tricuspid regurgitation. The pulmonic valve is not well visualized. Estimated pulmonary artery systolic pressure is at 21 mmHg assuming a right   atrial pressure of 3 mmHg. Echo 2/2014:  Summary    -Normal left ventricle size, wall thickness and systolic function with an    estimated ejection fraction of 60%.  -Mild mitral regurgitation is present.    -There is mild tricuspid regurgitation with RVSP estimated at 31 mmHg. All labs and testing reviewed. CARDIOLOGY LABS:   CBC: No results for input(s): WBC, HGB, HCT, PLT in the last 72 hours. BMP: No results for input(s): NA, K, CO2, BUN, CREATININE, LABGLOM, GLUCOSE in the last 72 hours. PT/INR: No results for input(s): PROTIME, INR in the last 72 hours. APTT:No results for input(s): APTT in the last 72 hours. FASTING LIPID PANEL:  Lab Results   Component Value Date    HDL 88 09/23/2021    LDLCALC 126 09/23/2021    TRIG 95 09/23/2021     LIVER PROFILE:No results for input(s): AST, ALT, ALB in the last 72 hours.     IMPRESSION:    Assessment:   Persistent atrial fibrillation: stable   -[id number] score 2 (age, gender)   Paroxsymal atrial flutter: stable   Mitral valve prolapse  Mild mitral regurgitation   Basal cell carcinoma s/p MOHS procedure 2019      Plan:   Continue Eliquis, Cartia, Toprol and Eliquis   Will update CBC at next visit   Discussed additional treatment options for AF including antiarrythmic medications, RFCA and pacemaker with AVN ablation. Patient is satisfied with current treatment plan of rate control and stroke prophylaxis. Would like to continue to wait on further treatment.   Follow up in 4 months or sooner     BRANDAN Davila-SARAH  [facility]  [phone]

## 2022-02-11 NOTE — LETTER
[address] [place] [address] Cardiology - [address] [facility] [address] [address]  Phone: [phone]  Fax: [phone]    BRANDAN Nice - SARAH    February 14, 2022     Antonia Joshua, BRANDAN - [address] [place] [address]    Patient: Dre Anderson   MR Number: [id number]   YOB: 1955   Date of Visit: 2/11/2022       Dear Antonia Joshua: Thank you for referring Dre Anderson to me for evaluation/treatment. Below are the relevant portions of my assessment and plan of care. If you have questions, please do not hesitate to call me. I look forward to following [facility] along with you.     Sincerely,      BRANDAN Orourke - CNP

## 2022-03-08 ENCOUNTER — OFFICE VISIT (OUTPATIENT)
Dept: DERMATOLOGY | Age: 67
End: 2022-03-08
Payer: COMMERCIAL

## 2022-03-08 VITALS — TEMPERATURE: 96.8 F

## 2022-03-08 DIAGNOSIS — L21.9 SEBORRHEIC DERMATITIS: ICD-10-CM

## 2022-03-08 DIAGNOSIS — L82.1 SEBORRHEIC KERATOSES: ICD-10-CM

## 2022-03-08 DIAGNOSIS — Z85.828 HISTORY OF BASAL CELL CANCER: ICD-10-CM

## 2022-03-08 DIAGNOSIS — D22.9 BENIGN NEVUS: ICD-10-CM

## 2022-03-08 DIAGNOSIS — Z86.018 HISTORY OF DYSPLASTIC NEVUS: ICD-10-CM

## 2022-03-08 DIAGNOSIS — D48.5 NEOPLASM OF UNCERTAIN BEHAVIOR OF SKIN: Primary | ICD-10-CM

## 2022-03-08 PROCEDURE — 11102 TANGNTL BX SKIN SINGLE LES: CPT | Performed by: DERMATOLOGY

## 2022-03-08 PROCEDURE — 11103 TANGNTL BX SKIN EA SEP/ADDL: CPT | Performed by: DERMATOLOGY

## 2022-03-08 PROCEDURE — 99214 OFFICE O/P EST MOD 30 MIN: CPT | Performed by: DERMATOLOGY

## 2022-03-08 NOTE — PROGRESS NOTES
[facility]) Dermatology  Ernesto Ramos M.D.  [phone]       Ir Zeke  1955    78 y.o. female     Date of Visit: 3/8/2022    Chief Complaint:   Chief Complaint   Patient presents with    Skin Lesion        I was asked to see this patient by Dr. Carrington ref. provider found. History of Present Illness:  1. Total-body skin exam    Seborrheic dermatitis currently flaring-yellow scale nose, eyebrows, paranasal cheeks. Uses hydrocortisone 1% cream for 2 to 3 days approximately 1 time per month. Uses Elidel 1% cream approximately 1 time per week. Has not started zinc pyrithione topically, which we had previously discussed. Currently flaring and flares approximately 1 time per month with erythema, yellow scale. Increasing number of seborrheic keratoses torso-increasing in size and number. Not itching, bleeding. Multiple nevi. Stable in size, shape, color.   Has not noticed any new or changing pigmented lesion    Skin history:  7/14 basal cell carcinoma [facility], status post Mohs surgery and forehead flap  11/15 right upper paraspinal back dysplastic nevus, mild, saucerization  11/15 right posterior shoulder dysplastic nevus, mild, extended the biopsy margins on initial biopsy- re-saucerization  4/16 right mid paraspinal back dysplastic nevus, mild  4/16 right anterior shoulder dysplastic nevus, mild  4/16 left mid upper arm dysplastic nevus, mild  3/17 nodular basal cell carcinoma left posterior upper arm status post curettage  11/17 right upper paraspinal back dysplastic nevus with moderate dysplasia  9/18 right lower back dysplastic nevus with mild dysplasia  3/19 left mid lateral back irritated compound nevus, recurrent 9/19 9/19 basal cell carcinoma right lower eyelid status post Mohs and oculoplastics reconstruction   10/20 basal cell carcinoma central chest status post curettage     AK- LN2     Rosacea- Metrocream .75% 11/17-not helpful-uses Elidel 1% cream as needed 9/20       Review of Systems:  Constitutional: Reports general sense of well-being       Past Medical History, Surgical History, Family History, Medications and Allergies reviewed. Social History:   Social History     Socioeconomic History    Marital status:      Spouse name: Not on file    Number of children: Not on file    Years of education: Not on file    Highest education level: Not on file   Occupational History    Not on file   Tobacco Use    Smoking status: Never Smoker    Smokeless tobacco: Never Used   Vaping Use    Vaping Use: Never used   Substance and Sexual Activity    Alcohol use: Yes     Comment: occas    Drug use: No    Sexual activity: Not on file   Other Topics Concern    Not on file   Social History Narrative    Not on file     Social Determinants of Health     Financial Resource Strain: Low Risk     Difficulty of Paying Living Expenses: Not hard at all   Food Insecurity: No Food Insecurity    Worried About [address] [employer] in the Last Year: Never true    [address] [facility] [employer] in the Last Year: Never true   Transportation Needs: No Transportation Needs    Lack of Transportation (Medical): No    Lack of Transportation (Non-Medical):  No   Physical Activity:     Days of Exercise per Week: Not on file    Minutes of Exercise per Session: Not on file   Stress:     Feeling of Stress : Not on file   Social Connections:     Frequency of Communication with Friends and Family: Not on file    Frequency of Social Gatherings with Friends and Family: Not on file    Attends [employer] Services: Not on file    Active Member of Clubs or Organizations: Not on file    Attends Club or Organization Meetings: Not on file    Marital Status: Not on file   Intimate Partner Violence:     Fear of Current or Ex-Partner: Not on file    Emotionally Abused: Not on file    Physically Abused: Not on file    Sexually Abused: Not on file   Housing Stability:     Unable to Pay for Housing in the Last Year: Not on file    Number of Places Lived in the Last Year: Not on file    Unstable Housing in the Last Year: Not on file       Physical Examination       -General: Well-appearing, NAD  1. Normal affect. Total body skin exam including scalp, face, neck, chest, abdomen, back, bilateral upper extremities, bilateral lower extremities, ocular conjunctiva, external lips, and nails was performed. Examination normal unless stated below. Underwear area not examined. Scattered on the trunk and extremities are multiple well-defined round and oval symmetric smoothly-bordered uniformly brown macules and papules. Background erythema with telangiectasias of face-yellow scale nasal dorsum, eyebrows, paranasal cheeks. Multiple hyperkeratotic stuck on papules and plaques torso. Well-healed scars no sign of recurrence. Left proximal medial lower leg 5 mm dark brown irregular papule rule out atypia/malignant melanoma  Right dorsal mid forearm 5 mm irregular brown and dark brown papule rule out atypia              Assessment and Plan     1. Neoplasm of uncertain behavior of skin-left proximal medial lower leg, right mid dorsal forearm--Discussed possible diagnosis. Patient agreeable to biopsy. Verbal consent obtained after risks (infection, bleeding, scar), benefits and alternatives explained. -Area(s) to be biopsied were marked with a surgical pen. Site(s) were cleansed with alcohol. Local anesthesia achieved with 1% lidocaine with epinephrine/sodium bicarbonate. Shave biopsy performed with a razor blade. Hemostasis was achieved with aluminum chloride. The wound(s) were dressed with petrolatum and covered with a bandage. Specimen(s) sent to pathology. Pt educated re: risk of bleeding, infection, scar and wound care instructions.      2. Benign nevus - Benign acquired melanocytic nevi  -Recommend monthly self skin exams   -Educated regarding the ABCDEs of melanoma detection   -Call for any new/changing moles or concerning lesions  -Reviewed sun protective behavior -- sun avoidance during the peak hours of the day, sun-protective clothing (including hat and sunglasses), sunscreen use (water resistant, broad spectrum, SPF at least 30, need for reapplication every 2 to 3 hours), avoidance of tanning beds      3. Seborrheic keratoses - Discussed underlying nature of seborrheic keratosis and low risk of malignancy. Treatment reserved for lesions that are itching, bleeding, growing or otherwise becoming bothersome. Discussed monitoring for change with reevaluation for changing lesions. 4. Seborrheic dermatitis-chronic problem, flaring-discussed Z bar to be used daily-sample given. Elidel 1% cream twice daily during flares and may need to use this several times per week for maintenance. Discussed hydrocortisone-may be used for 1 to 2 days/month that she has been doing but avoid ongoing use. 5. History of dysplastic nevus-monitor for new or changing pigmented lesions   6. History of basal cell cancer - No evidence of recurrence. Discussed risk of subsequent skin cancers and increased risk of melanoma. Reviewed importance of monitoring skin for change and sun protection with hats and sunscreen, as well as sun avoidance.

## 2022-03-10 LAB — DERMATOLOGY PATHOLOGY REPORT: NORMAL

## 2022-04-28 DIAGNOSIS — I48.0 PAROXYSMAL ATRIAL FIBRILLATION (HCC): ICD-10-CM

## 2022-05-01 DIAGNOSIS — I48.0 PAROXYSMAL ATRIAL FIBRILLATION (HCC): ICD-10-CM

## 2022-05-02 RX ORDER — APIXABAN 5 MG/1
TABLET, FILM COATED ORAL
Qty: 180 TABLET | Refills: 3 | Status: SHIPPED | OUTPATIENT
Start: 2022-05-02

## 2022-05-02 RX ORDER — METOPROLOL SUCCINATE 25 MG/1
TABLET, EXTENDED RELEASE ORAL
Qty: 90 TABLET | Refills: 3 | Status: SHIPPED | OUTPATIENT
Start: 2022-05-02

## 2022-05-02 NOTE — TELEPHONE ENCOUNTER
Pt calling regarding Eliquis 5 mg BID and Metoprolol Succinate 25 mg refill requests.  Last OV 02/11/2022 with NPAM.

## 2022-05-03 RX ORDER — METOPROLOL SUCCINATE 25 MG/1
25 TABLET, EXTENDED RELEASE ORAL DAILY
Qty: 90 TABLET | Refills: 3 | OUTPATIENT
Start: 2022-05-03

## 2022-07-05 NOTE — PROGRESS NOTES
[address]   Electrophysiology Outpatient Note              Date:  July 6, 2022  Patient name: Lillian Pretty  YOB: 1955    Primary Care physician: BRANDAN Joiner CNP    HISTORY OF PRESENT ILLNESS: The patient is a 75 y.o.  female with a history of AF, AFL, MVP, mild MR, and basal cell carcinoma. In 2014, she was admitted at [facility] for atrial flutter. In 2/2015, She wore an event monitor that showed several brief episodes of atrial flutter. In 2/2021, she was evaluated in the ED for lightheadedness, palpitations and nausea. ECG monitoring in the ER showed SR. She wore an event monitor from 3/1/2021 to 3/30/2021 that showed SR with PAF burden of 5%. Today she is being seen for PAF and AFL. EKG shows SB with a HR of 56. She thinks she converted from AF to sinus sometime in March or April. She has noticed that she has a little bit more energy when exercising in sinus. Otherwise, she reports that she did not feel poorly in AF. Denies chest pain, shortness of breath, palpitations or dizziness. She continues to exercise daily with walking. Past Medical History:   has a past medical history of Atrial flutter, paroxysmal ([address] BCC (basal cell carcinoma), face, and Broken arm. Past Surgical History:   has a past surgical history that includes Skin cancer excision (2014); Cosmetic surgery (2014); and Mohs surgery (12/09/2019). Home Medications:    Prior to Admission medications    Medication Sig Start Date End Date Taking?  Authorizing Provider   ELIQUIS 5 MG TABS tablet TAKE ONE TABLET BY MOUTH TWICE A DAY 5/2/22  Yes Al Hoang MD   metoprolol succinate (TOPROL XL) 25 MG extended release tablet TAKE ONE TABLET BY MOUTH DAILY 5/2/22  Yes Al Hoang MD   calcium carbonate-vitamin D 600-200 MG-UNIT TABS Take 1 tablet by mouth 2 times daily   Yes Historical Provider, MD   dilTIAZem (CARDIZEM CD) 240 MG extended release capsule Take 1 capsule by mouth daily 2/11/22  Yes July Najera APRN - CNP   pimecrolimus (ELIDEL) 1 % cream Apply topically 2 times daily. Patient taking differently: as needed Apply topically 2 times daily. 9/14/20  Yes Misael Garcia MD   Multiple Vitamins-Minerals (MULTIVITAMIN ADULT PO) Take 1 tablet by mouth daily   Yes Historical Provider, MD   TRIAMCINOLONE ACETONIDE EX Apply topically every 14 days    Yes Historical Provider, MD   aspirin (ASPIRIN CHILDRENS) 81 MG chewable tablet Take 1 tablet by mouth daily. 2/14/14  Yes Josiah Ren MD       Allergies:  Patient has no known allergies. Social History:   reports that she has never smoked. She has never used smokeless tobacco. She reports current alcohol use. She reports that she does not use drugs. Family History: family history includes Arrhythmia (age of onset: 80) in her mother; Cancer in her father. All 14 point review of systems are completed and pertinent positives are mentioned in the history of present illness. Other systems are reviewed and are negative. PHYSICAL EXAM:    Vital signs:    BP (!) 102/50   Pulse 58   Ht 5' 4.5\" (1.638 m)   Wt 131 lb (59.4 kg)   SpO2 98%   BMI 22.14 kg/m²      Constitutional and general appearance: alert, cooperative, no distress and appears stated age  HEENT: PERRL, no cervical lymphadenopathy. No masses palpable.  Normal oral mucosa  Respiratory:  · Normal excursion and expansion without use of accessory muscles  · Resp auscultation: Normal breath sounds without wheezing, rhonchi, and rales  Cardiovascular:  · The apical impulse is not displaced  · Heart tones are crisp and normal. Irregular S1 and S2.  · Jugular venous pulsation Normal  · The carotid upstroke is normal in amplitude and contour without delay or bruit  · Peripheral pulses are symmetrical and full   Abdomen:  · No masses or tenderness  · Bowel sounds present  Extremities:  ·  No cyanosis or clubbing  ·  No lower extremity edema  ·  Skin: warm and dry  Neurological:  · Alert and oriented  · Moves all extremities well  · No abnormalities of mood, affect, memory, mentation, or behavior are noted    DATA:    ECG 7/6/2022:  SB 56 bpm     Echo 11/2021:      Summary   Normal left ventricle size, wall thickness, and systolic function with an   estimated ejection fraction of 55-60%. No regional wall motion abnormalities are seen. Normal diastolic function. Moderate mitral regurgitation. Mild aortic regurgitation. Mild to moderate tricuspid regurgitation. The pulmonic valve is not well visualized. Estimated pulmonary artery systolic pressure is at 21 mmHg assuming a right   atrial pressure of 3 mmHg. Echo 2/2014:  Summary    -Normal left ventricle size, wall thickness and systolic function with an    estimated ejection fraction of 60%.  -Mild mitral regurgitation is present.    -There is mild tricuspid regurgitation with RVSP estimated at 31 mmHg. All labs and testing reviewed. CARDIOLOGY LABS:   CBC: No results for input(s): WBC, HGB, HCT, PLT in the last 72 hours. BMP: No results for input(s): NA, K, CO2, BUN, CREATININE, LABGLOM, GLUCOSE in the last 72 hours. PT/INR: No results for input(s): PROTIME, INR in the last 72 hours. APTT:No results for input(s): APTT in the last 72 hours. FASTING LIPID PANEL:  Lab Results   Component Value Date/Time    HDL 88 09/23/2021 07:29 AM    LDLCALC 126 09/23/2021 07:29 AM    TRIG 95 09/23/2021 07:29 AM     LIVER PROFILE:No results for input(s): AST, ALT, ALB in the last 72 hours.     IMPRESSION:    Assessment:   Paroxysmal atrial fibrillation (formerly persistent): stable   -[id number] score 2 (age, gender)   Paroxsymal atrial flutter: stable   Mitral valve prolapse  Mild mitral regurgitation   Basal cell carcinoma s/p MOHS procedure 2019      Plan:   Continue Eliquis, Cartia, and Toprol   Annual labs due now (CBC, BMP)  Discussed additional treatment options for AF including antiarrythmic medications, RFCA and pacemaker with AVN ablation. Patient is satisfied with current treatment plan of rate control and stroke prophylaxis. She will notify us if she reconsiders.   Follow up in 6 months then yearly if able     MDM BRANDAN Patterson-CNP  [address]  [phone]

## 2022-07-06 ENCOUNTER — OFFICE VISIT (OUTPATIENT)
Dept: CARDIOLOGY CLINIC | Age: 67
End: 2022-07-06
Payer: COMMERCIAL

## 2022-07-06 VITALS
SYSTOLIC BLOOD PRESSURE: 102 MMHG | WEIGHT: 131 LBS | HEART RATE: 58 BPM | OXYGEN SATURATION: 98 % | HEIGHT: 65 IN | BODY MASS INDEX: 21.83 KG/M2 | DIASTOLIC BLOOD PRESSURE: 50 MMHG

## 2022-07-06 DIAGNOSIS — I48.92 ATRIAL FLUTTER, PAROXYSMAL (HCC): ICD-10-CM

## 2022-07-06 DIAGNOSIS — I48.0 PAF (PAROXYSMAL ATRIAL FIBRILLATION) (HCC): Primary | ICD-10-CM

## 2022-07-06 PROCEDURE — 93000 ELECTROCARDIOGRAM COMPLETE: CPT | Performed by: NURSE PRACTITIONER

## 2022-07-06 PROCEDURE — 99214 OFFICE O/P EST MOD 30 MIN: CPT | Performed by: NURSE PRACTITIONER

## 2022-07-06 PROCEDURE — 1123F ACP DISCUSS/DSCN MKR DOCD: CPT | Performed by: NURSE PRACTITIONER

## 2022-07-06 RX ORDER — DILTIAZEM HYDROCHLORIDE 240 MG/1
240 CAPSULE, COATED, EXTENDED RELEASE ORAL DAILY
Qty: 90 CAPSULE | Refills: 3 | Status: SHIPPED | OUTPATIENT
Start: 2022-07-06

## 2022-08-02 ENCOUNTER — HOSPITAL ENCOUNTER (OUTPATIENT)
Age: 67
Discharge: HOME | End: 2022-08-02
Payer: COMMERCIAL

## 2022-08-02 DIAGNOSIS — I48.0 PAF (PAROXYSMAL ATRIAL FIBRILLATION) (HCC): ICD-10-CM

## 2022-08-02 LAB
ANION GAP SERPL CALCULATED.3IONS-SCNC: 14 MMOL/L (ref 3–16)
BASOPHILS ABSOLUTE: 0.1 K/UL (ref 0–0.2)
BASOPHILS RELATIVE PERCENT: 1.2 %
BUN BLDV-MCNC: 11 MG/DL (ref 7–20)
CALCIUM SERPL-MCNC: 9.9 MG/DL (ref 8.3–10.6)
CHLORIDE BLD-SCNC: 101 MMOL/L (ref 99–110)
CO2: 27 MMOL/L (ref 21–32)
CREAT SERPL-MCNC: 0.7 MG/DL (ref 0.6–1.2)
EOSINOPHILS ABSOLUTE: 0.3 K/UL (ref 0–0.6)
EOSINOPHILS RELATIVE PERCENT: 5.8 %
GFR AFRICAN AMERICAN: >60
GFR NON-AFRICAN AMERICAN: >60
GLUCOSE BLD-MCNC: 95 MG/DL (ref 70–99)
HCT VFR BLD CALC: 38.6 % (ref 36–48)
HEMOGLOBIN: 13.3 G/DL (ref 12–16)
LYMPHOCYTES ABSOLUTE: 1.3 K/UL (ref 1–5.1)
LYMPHOCYTES RELATIVE PERCENT: 24.8 %
MCH RBC QN AUTO: 31.5 PG (ref 26–34)
MCHC RBC AUTO-ENTMCNC: 34.4 G/DL (ref 31–36)
MCV RBC AUTO: 91.5 FL (ref 80–100)
MONOCYTES ABSOLUTE: 0.6 K/UL (ref 0–1.3)
MONOCYTES RELATIVE PERCENT: 10.9 %
NEUTROPHILS ABSOLUTE: 2.9 K/UL (ref 1.7–7.7)
NEUTROPHILS RELATIVE PERCENT: 57.3 %
PDW BLD-RTO: 12.8 % (ref 12.4–15.4)
PLATELET # BLD: 259 K/UL (ref 135–450)
PMV BLD AUTO: 9.7 FL (ref 5–10.5)
POTASSIUM SERPL-SCNC: 4.5 MMOL/L (ref 3.5–5.1)
RBC # BLD: 4.22 M/UL (ref 4–5.2)
SODIUM BLD-SCNC: 142 MMOL/L (ref 136–145)
WBC # BLD: 5.1 K/UL (ref 4–11)

## 2022-08-02 PROCEDURE — 80048 BASIC METABOLIC PNL TOTAL CA: CPT

## 2022-08-02 PROCEDURE — 85025 COMPLETE CBC W/AUTO DIFF WBC: CPT

## 2022-08-02 PROCEDURE — 36415 COLL VENOUS BLD VENIPUNCTURE: CPT

## 2022-08-03 ENCOUNTER — TELEPHONE (OUTPATIENT)
Dept: CARDIOLOGY CLINIC | Age: 67
End: 2022-08-03

## 2022-08-03 NOTE — TELEPHONE ENCOUNTER
----- Message from BRANDAN Warren CNP sent at 8/2/2022  4:03 PM EDT -----  Reviewed. Please let patient know her labs are stable.

## 2022-09-07 ENCOUNTER — OFFICE VISIT (OUTPATIENT)
Dept: DERMATOLOGY | Age: 67
End: 2022-09-07
Payer: COMMERCIAL

## 2022-09-07 DIAGNOSIS — L21.9 SEBORRHEIC DERMATITIS: ICD-10-CM

## 2022-09-07 DIAGNOSIS — D22.9 BENIGN NEVUS: ICD-10-CM

## 2022-09-07 DIAGNOSIS — Z85.828 HISTORY OF BASAL CELL CANCER: ICD-10-CM

## 2022-09-07 DIAGNOSIS — D22.5 IRRITATED NEVUS OF BACK: ICD-10-CM

## 2022-09-07 DIAGNOSIS — D18.01 HEMANGIOMA OF SKIN: ICD-10-CM

## 2022-09-07 DIAGNOSIS — D48.5 NEOPLASM OF UNCERTAIN BEHAVIOR OF SKIN: Primary | ICD-10-CM

## 2022-09-07 PROCEDURE — 99214 OFFICE O/P EST MOD 30 MIN: CPT | Performed by: DERMATOLOGY

## 2022-09-07 PROCEDURE — 1123F ACP DISCUSS/DSCN MKR DOCD: CPT | Performed by: DERMATOLOGY

## 2022-09-07 PROCEDURE — 11103 TANGNTL BX SKIN EA SEP/ADDL: CPT | Performed by: DERMATOLOGY

## 2022-09-07 PROCEDURE — 11102 TANGNTL BX SKIN SINGLE LES: CPT | Performed by: DERMATOLOGY

## 2022-09-07 RX ORDER — PIMECROLIMUS 10 MG/G
CREAM TOPICAL
Qty: 60 G | Refills: 5 | Status: SHIPPED | OUTPATIENT
Start: 2022-09-07

## 2022-09-07 NOTE — PROGRESS NOTES
dark brown well-demarcated papule              Assessment and Plan     1. Neoplasm of uncertain behavior of skin-left lower lateral back, right lower back--Discussed possible diagnosis. Patient agreeable to biopsy. Verbal consent obtained after risks (infection, bleeding, scar), benefits and alternatives explained. -Area(s) to be biopsied were marked with a surgical pen. Site(s) were cleansed with alcohol. Local anesthesia achieved with 1% lidocaine with epinephrine/sodium bicarbonate. Shave biopsy performed with a razor blade. Hemostasis was achieved with aluminum chloride. The wound(s) were dressed with petrolatum and covered with a bandage. Specimen(s) sent to pathology. Pt educated re: risk of bleeding, infection, scar and wound care instructions. 2. Seborrheic dermatitis-continue Z bar-samples given. Elidel 1% cream twice daily. Discussed using Elidel 1% cream twice daily and following up in the next 1 to 2 months for reevaluation of nose due to active seborrheic dermatitis today-suboptimal exam   3. Irritated nevus of back-follow-up for shave removal   4. Hemangioma of skin and follow-up for shave removal and cautery   5. Benign nevus - Benign acquired melanocytic nevi  -Recommend monthly self skin exams   -Educated regarding the ABCDEs of melanoma detection   -Call for any new/changing moles or concerning lesions  -Reviewed sun protective behavior -- sun avoidance during the peak hours of the day, sun-protective clothing (including hat and sunglasses), sunscreen use (water resistant, broad spectrum, SPF at least 30, need for reapplication every 2 to 3 hours), avoidance of tanning beds      6. History of basal cell cancer - No evidence of recurrence. Discussed risk of subsequent skin cancers and increased risk of melanoma. Reviewed importance of monitoring skin for change and sun protection with hats and sunscreen, as well as sun avoidance.

## 2022-09-09 LAB — DERMATOLOGY PATHOLOGY REPORT: NORMAL

## 2022-09-22 ENCOUNTER — PROCEDURE VISIT (OUTPATIENT)
Dept: DERMATOLOGY | Age: 67
End: 2022-09-22
Payer: COMMERCIAL

## 2022-09-22 DIAGNOSIS — D22.4 IRRITATED NEVUS OF NECK: ICD-10-CM

## 2022-09-22 DIAGNOSIS — D18.01 HEMANGIOMA OF SKIN: ICD-10-CM

## 2022-09-22 DIAGNOSIS — L82.0 SEBORRHEIC KERATOSES, INFLAMED: Primary | ICD-10-CM

## 2022-09-22 PROCEDURE — 11301 SHAVE SKIN LESION 0.6-1.0 CM: CPT | Performed by: DERMATOLOGY

## 2022-09-22 PROCEDURE — 17110 DESTRUCTION B9 LES UP TO 14: CPT | Performed by: DERMATOLOGY

## 2022-09-22 PROCEDURE — 11306 SHAVE SKIN LESION 0.6-1.0 CM: CPT | Performed by: DERMATOLOGY

## 2022-09-22 NOTE — PROGRESS NOTES
[facility]) Dermatology  Sonu Nagy M.D.  [phone]       Liberty Montoya  1955    75 y.o. female     Date of Visit: 9/22/2022    Chief Complaint:   Chief Complaint   Patient presents with    Skin Lesion     Bx neck, back of L arm        I was asked to see this patient by Dr. Carrington ref. provider found. History of Present Illness:  1. Patient presents today for several issues    Inflamed seborrheic keratoses anterior neck and left base of anterior neck. Raised, have increased in size. Inflamed hemangioma left posterior upper arm-has slowly increased in size, now easily traumatized. Irritated nevus right supraclavicular neck.   Has increased in size, easily traumatized    Had previously mentioned other irritated nevi that she wanted removed on her torso-states that they are not currently bothersome and defers removal today     Skin history:  7/14 basal cell carcinoma [facility], status post Mohs surgery and forehead flap  11/15 right upper paraspinal back dysplastic nevus, mild, saucerization  11/15 right posterior shoulder dysplastic nevus, mild, extended the biopsy margins on initial biopsy- re-saucerization  4/16 right mid paraspinal back dysplastic nevus, mild  4/16 right anterior shoulder dysplastic nevus, mild  4/16 left mid upper arm dysplastic nevus, mild  3/17 nodular basal cell carcinoma left posterior upper arm status post curettage  11/17 right upper paraspinal back dysplastic nevus with moderate dysplasia  9/18 right lower back dysplastic nevus with mild dysplasia  3/19 left mid lateral back irritated compound nevus, recurrent 9/19 9/19 basal cell carcinoma right lower eyelid status post Mohs and oculoplastics reconstruction   10/20 basal cell carcinoma central chest status post curettage  3/22 right mid dorsal forearm dysplastic nevus with mild dysplasia  3/22 left proximal medial lower leg dysplastic nevus with mild dysplasia     AK- LN2     Rosacea- Metrocream .75% 11/17-not helpful-uses Elidel 1% cream as needed 9/20       Review of Systems:  Constitutional: Reports general sense of well-being       Past Medical History, Surgical History, Family History, Medications and Allergies reviewed. Social History:   Social History     Socioeconomic History    Marital status:      Spouse name: Not on file    Number of children: Not on file    Years of education: Not on file    Highest education level: Not on file   Occupational History    Not on file   Tobacco Use    Smoking status: Never    Smokeless tobacco: Never   Vaping Use    Vaping Use: Never used   Substance and Sexual Activity    Alcohol use: Yes     Comment: occas    Drug use: No    Sexual activity: Not on file   Other Topics Concern    Not on file   Social History Narrative    Not on file     Social Determinants of Health     Financial Resource Strain: Not on file   Food Insecurity: Not on file   Transportation Needs: Not on file   Physical Activity: Not on file   Stress: Not on file   Social Connections: Not on file   Intimate Partner Violence: Not on file   Housing Stability: Not on file       Physical Examination       -General: Well-appearing, NAD  1. Hyperkeratotic stuck on tan papules anterior neck, left base of anterior neck. Right supraclavicular neck 7 mm raised flesh-colored to tan papule  Left upper arm 7 mm purple papule          Assessment and Plan     1. Seborrheic keratoses, inflamed -anterior neck x2, left base of anterior neck x1 (3 total) after the risks, complications, and alternatives were explained to the patient, including risk of blister formation, discomfort, scar, hypopigmentation, patient elected to proceed with cryotherapy. Lesion(s) were treated w/ liquid nitrogen using a Cryogun. 1 freeze thaw cycle was performed with a freeze time of 1-5 seconds. There were no immediate complications. Wound care instructions were discussed with the patient.     May need additional treatment-offered return visit, patient will call if needed. Otherwise recheck at return visit tbse     2. Irritated nevus of neck-right supraclavicular neck--Verbal consent obtained after risks (infection, bleeding, scar), benefits and alternatives explained. -Area(s) to be shaved were marked with a surgical pen. Site(s) were cleansed with alcohol. Local anesthesia achieved with 1% lidocaine with epinephrine/sodium bicarbonate. Shave lesion performed with a razor blade. Hemostasis was achieved with aluminum chloride. The wound(s) were dressed with petrolatum and covered with a bandage. Specimen(s) sent to pathology. Pt educated re: risk of bleeding, infection, scar and wound care instructions. 3. Hemangioma of skin -left posterior upper arm-Verbal consent obtained after risks (infection, bleeding, scar), benefits and alternatives explained. -Area(s) to be shaved were marked with a surgical pen. Site(s) were cleansed with alcohol. Local anesthesia achieved with 1% lidocaine with epinephrine/sodium bicarbonate. Shave lesion performed with a razor blade. Hemostasis was achieved with aluminum chloride and electrocautery. The wound(s) were dressed with petrolatum and covered with a bandage. Specimen(s) sent to pathology. Pt educated re: risk of bleeding, infection, scar and wound care instructions.

## 2022-09-26 LAB — DERMATOLOGY PATHOLOGY REPORT: NORMAL

## 2022-11-02 ENCOUNTER — HOSPITAL ENCOUNTER (OUTPATIENT)
Dept: MAMMOGRAPHY | Age: 67
Discharge: HOME | End: 2022-11-02
Payer: MEDICARE

## 2022-11-02 VITALS — BODY MASS INDEX: 22.36 KG/M2 | HEIGHT: 64 IN | WEIGHT: 131 LBS

## 2022-11-02 DIAGNOSIS — Z12.31 VISIT FOR SCREENING MAMMOGRAM: ICD-10-CM

## 2022-11-02 PROCEDURE — 77067 SCR MAMMO BI INCL CAD: CPT

## 2022-12-27 NOTE — PROGRESS NOTES
[address]   Electrophysiology Outpatient Note              Date:  December 27, 2022  Patient name: Dai Burns  YOB: 1955    Primary Care physician: BRANDAN Tuttle CNP    HISTORY OF PRESENT ILLNESS: The patient is a 75 y.o.  female with a history of AF, AFL, MVP, mild MR, and basal cell carcinoma. In 2014, she was admitted at [facility] for atrial flutter. In 2/2015, She wore an event monitor that showed several brief episodes of atrial flutter. In 2/2021, she was evaluated in the ED for lightheadedness, palpitations and nausea. ECG monitoring in the ER showed SR. She wore an event monitor from 3/1/2021 to 3/30/2021 that showed SR with PAF burden of 5%. Today she is being seen for PAF and AFL. EKG shows AF with a HR of 75. She thinks she went back into AF in September. She does report some shortness of breath with fast walking and going up stairs but reports it has been tolerable. Her heart rates have been controlled and on average stay below 100 bpm.     Past Medical History:   has a past medical history of Atrial flutter, paroxysmal ([address] BCC (basal cell carcinoma), face, and Broken arm. Past Surgical History:   has a past surgical history that includes Skin cancer excision (2014); Cosmetic surgery (2014); and Mohs surgery (12/09/2019). Home Medications:    Prior to Admission medications    Medication Sig Start Date End Date Taking? Authorizing Provider   pimecrolimus (ELIDEL) 1 % cream Apply topically 2 times daily.  9/7/22   Jay Esparza MD   dilTIAZem (CARDIZEM CD) 240 MG extended release capsule Take 1 capsule by mouth daily 7/6/22   BRANDAN Saldivar CNP   ELIQUIS 5 MG TABS tablet TAKE ONE TABLET BY MOUTH TWICE A DAY 5/2/22   Frederick Lee MD   metoprolol succinate (TOPROL XL) 25 MG extended release tablet TAKE ONE TABLET BY MOUTH DAILY 5/2/22   Frederick Lee MD   calcium carbonate-vitamin D 600-200 MG-UNIT TABS Take 1 tablet by mouth 2 times daily    Historical Provider, MD   Multiple Vitamins-Minerals (MULTIVITAMIN ADULT PO) Take 1 tablet by mouth daily    Historical Provider, MD   TRIAMCINOLONE ACETONIDE EX Apply topically every 14 days     Historical Provider, MD   aspirin (ASPIRIN CHILDRENS) 81 MG chewable tablet Take 1 tablet by mouth daily. 2/14/14   Rhianna Sun MD       Allergies:  Patient has no known allergies. Social History:   reports that she has never smoked. She has never used smokeless tobacco. She reports current alcohol use. She reports that she does not use drugs. Family History: family history includes Arrhythmia (age of onset: 80) in her mother; Cancer in her father. All 14 point review of systems are completed and pertinent positives are mentioned in the history of present illness. Other systems are reviewed and are negative. PHYSICAL EXAM:    Vital signs: There were no vitals taken for this visit. Constitutional and general appearance: alert, cooperative, no distress and appears stated age  [de-identified]: PERRL, no cervical lymphadenopathy. No masses palpable. Normal oral mucosa  Respiratory:  Normal excursion and expansion without use of accessory muscles  Resp auscultation: Normal breath sounds without wheezing, rhonchi, and rales  Cardiovascular:   The apical impulse is not displaced  Heart tones are crisp and normal. Irregular S1 and S2.  Jugular venous pulsation Normal  The carotid upstroke is normal in amplitude and contour without delay or bruit  Peripheral pulses are symmetrical and full   Abdomen:  No masses or tenderness  Bowel sounds present  Extremities:   No cyanosis or clubbing   No lower extremity edema   Skin: warm and dry  Neurological:  Alert and oriented  Moves all extremities well  No abnormalities of mood, affect, memory, mentation, or behavior are noted    DATA:    ECG 7/6/2022:  SB 56 bpm     Echo 11/2021:      Summary   Normal left ventricle size, wall thickness, and systolic function with an   estimated ejection fraction of 55-60%. No regional wall motion abnormalities are seen. Normal diastolic function. Moderate mitral regurgitation. Mild aortic regurgitation. Mild to moderate tricuspid regurgitation. The pulmonic valve is not well visualized. Estimated pulmonary artery systolic pressure is at 21 mmHg assuming a right   atrial pressure of 3 mmHg. Echo 2/2014:  Summary    -Normal left ventricle size, wall thickness and systolic function with an    estimated ejection fraction of 60%. -Mild mitral regurgitation is present.    -There is mild tricuspid regurgitation with RVSP estimated at 31 mmHg. All labs and testing reviewed. CARDIOLOGY LABS:   CBC: No results for input(s): WBC, HGB, HCT, PLT in the last 72 hours. BMP: No results for input(s): NA, K, CO2, BUN, CREATININE, LABGLOM, GLUCOSE in the last 72 hours. PT/INR: No results for input(s): PROTIME, INR in the last 72 hours. APTT:No results for input(s): APTT in the last 72 hours. FASTING LIPID PANEL:  Lab Results   Component Value Date/Time    HDL 88 09/23/2021 07:29 AM    LDLCALC 126 09/23/2021 07:29 AM    TRIG 95 09/23/2021 07:29 AM     LIVER PROFILE:No results for input(s): AST, ALT, ALB in the last 72 hours. IMPRESSION:    Assessment:   Persistent atrial fibrillation: recurrent, stable   -[id number] score 2 (age, gender)   Paroxsymal atrial flutter: stable   Mitral valve prolapse  Mild mitral regurgitation   Basal cell carcinoma s/p MOHS procedure 2019      Plan:   Continue Eliquis, Cartia, and Toprol   Annual labs due 8/2023 (CBC, BMP)  Again had a thorough discussion with patient regarding additional treatment options for AF including antiarrythmic medications, RFCA and pacemaker with AVN ablation. Patient is hesitant to agree to antiarrythmic medications or procedure at this time. I have included printed education regarding ablation.  She will review and notify us if she reconsiders. I will have her follow up with Dr. Flavio Strange or Dr. Declan Concepcion in 4-6 weeks so that if she does decide to move forward with a procedure, she is familiar with an EP MD who can offer AF ablation.     ERICK Najera, BRANADN-CNP  [employer]  [phone]

## 2023-01-16 ENCOUNTER — OFFICE VISIT (OUTPATIENT)
Dept: CARDIOLOGY CLINIC | Age: 68
End: 2023-01-16
Payer: MEDICARE

## 2023-01-16 VITALS
BODY MASS INDEX: 23.52 KG/M2 | WEIGHT: 137 LBS | OXYGEN SATURATION: 96 % | HEART RATE: 93 BPM | SYSTOLIC BLOOD PRESSURE: 100 MMHG | DIASTOLIC BLOOD PRESSURE: 58 MMHG

## 2023-01-16 DIAGNOSIS — I48.0 PAROXYSMAL ATRIAL FIBRILLATION (HCC): Primary | ICD-10-CM

## 2023-01-16 DIAGNOSIS — I48.92 ATRIAL FLUTTER, PAROXYSMAL (HCC): ICD-10-CM

## 2023-01-16 PROCEDURE — 93000 ELECTROCARDIOGRAM COMPLETE: CPT | Performed by: NURSE PRACTITIONER

## 2023-01-16 PROCEDURE — 99215 OFFICE O/P EST HI 40 MIN: CPT | Performed by: NURSE PRACTITIONER

## 2023-01-16 PROCEDURE — 1123F ACP DISCUSS/DSCN MKR DOCD: CPT | Performed by: NURSE PRACTITIONER

## 2023-01-16 NOTE — PATIENT INSTRUCTIONS
Continue current medications    Education attached regarding AF ablation    Continue to utilize Apple watch to monitor HR and rhythm     Follow up with EP MD in 4-6 weeks to further discuss possible ablation

## 2023-01-17 DIAGNOSIS — I48.0 PAROXYSMAL ATRIAL FIBRILLATION (HCC): ICD-10-CM

## 2023-01-17 NOTE — TELEPHONE ENCOUNTER
Refill  ELIQUIS 5 MG TABS tablet  metoprolol succinate (TOPROL XL) 25 MG extended release tablet   dilTIAZem (CARDIZEM CD) 240 MG extended release capsule  Send to [facility] [phone]    Please change pharmacy on chart TY

## 2023-01-18 RX ORDER — DILTIAZEM HYDROCHLORIDE 240 MG/1
240 CAPSULE, COATED, EXTENDED RELEASE ORAL DAILY
Qty: 90 CAPSULE | Refills: 1 | Status: SHIPPED | OUTPATIENT
Start: 2023-01-18

## 2023-01-18 RX ORDER — METOPROLOL SUCCINATE 25 MG/1
TABLET, EXTENDED RELEASE ORAL
Qty: 90 TABLET | Refills: 1 | Status: SHIPPED | OUTPATIENT
Start: 2023-01-18

## 2023-03-21 ENCOUNTER — OFFICE VISIT (OUTPATIENT)
Dept: DERMATOLOGY | Age: 68
End: 2023-03-21
Payer: MEDICARE

## 2023-03-21 DIAGNOSIS — D22.9 BENIGN NEVUS: ICD-10-CM

## 2023-03-21 DIAGNOSIS — L82.1 SEBORRHEIC KERATOSES: ICD-10-CM

## 2023-03-21 DIAGNOSIS — Z85.828 HISTORY OF BASAL CELL CANCER: ICD-10-CM

## 2023-03-21 DIAGNOSIS — L82.0 SEBORRHEIC KERATOSES, INFLAMED: ICD-10-CM

## 2023-03-21 DIAGNOSIS — D48.5 NEOPLASM OF UNCERTAIN BEHAVIOR OF SKIN: Primary | ICD-10-CM

## 2023-03-21 DIAGNOSIS — L57.0 AK (ACTINIC KERATOSIS): ICD-10-CM

## 2023-03-21 DIAGNOSIS — L21.9 SEBORRHEIC DERMATITIS: ICD-10-CM

## 2023-03-21 PROCEDURE — 11102 TANGNTL BX SKIN SINGLE LES: CPT | Performed by: DERMATOLOGY

## 2023-03-21 PROCEDURE — 17111 DESTRUCTION B9 LESIONS 15/>: CPT | Performed by: DERMATOLOGY

## 2023-03-21 PROCEDURE — 1123F ACP DISCUSS/DSCN MKR DOCD: CPT | Performed by: DERMATOLOGY

## 2023-03-21 PROCEDURE — 17000 DESTRUCT PREMALG LESION: CPT | Performed by: DERMATOLOGY

## 2023-03-21 PROCEDURE — 99214 OFFICE O/P EST MOD 30 MIN: CPT | Performed by: DERMATOLOGY

## 2023-03-21 PROCEDURE — 11103 TANGNTL BX SKIN EA SEP/ADDL: CPT | Performed by: DERMATOLOGY

## 2023-03-21 NOTE — PROGRESS NOTES
today.  Multiple hyperkeratotic stuck on papules and plaques neck, torso. Right posterior proximal lower leg 6 mm pink papule rule out basal cell carcinoma  Right superior shoulder 6 mm brown irregular papule rule out atypia-malignant melanoma          Assessment and Plan     1. Neoplasm of uncertain behavior of skin -right posterior proximal lower leg and right superior shoulder--Discussed possible diagnosis. Patient agreeable to biopsy. Verbal consent obtained after risks (infection, bleeding, scar), benefits and alternatives explained. -Area(s) to be biopsied were marked with a surgical pen. Site(s) were cleansed with alcohol. Local anesthesia achieved with 1% lidocaine with epinephrine/sodium bicarbonate. Shave biopsy performed with a razor blade. Hemostasis was achieved with aluminum chloride. The wound(s) were dressed with petrolatum and covered with a bandage. Specimen(s) sent to pathology. Pt educated re: risk of bleeding, infection, scar and wound care instructions. Right posterior proximal lower leg only:    Curettage performed after informed written consent obtained following explanation of risks, benefits, alternatives  -Local anesthesia acheived with 1% lidocaine with epinephrine/sodium bicarbonate. Sharp curettage performed in 3 different directions. First pass size 8mm. Hemostasis obtained with aluminum chloride.   -Edu re: bleeding, discomfort, infection, scar  -Edu re: wound care, risk of recurrence     2. AK (actinic keratosis) -left nose-1 after the risks, complications, and alternatives were explained to the patient, including risk of blister formation, discomfort, scar, hypopigmentation, patient elected to proceed with cryotherapy. Lesion(s) were treated w/ liquid nitrogen using a Cryogun. 1 freeze thaw cycle was performed with a freeze time of 1-5 seconds. There were no immediate complications. Wound care instructions were discussed with the patient.      3. Seborrheic

## 2023-03-27 LAB — DERMATOLOGY PATHOLOGY REPORT: NORMAL

## 2023-05-01 RX ORDER — METOPROLOL SUCCINATE 25 MG/1
TABLET, EXTENDED RELEASE ORAL
Qty: 90 TABLET | Refills: 1 | Status: SHIPPED | OUTPATIENT
Start: 2023-05-01

## 2023-06-28 RX ORDER — DILTIAZEM HYDROCHLORIDE 240 MG/1
240 CAPSULE, COATED, EXTENDED RELEASE ORAL DAILY
Qty: 90 CAPSULE | Refills: 1 | Status: SHIPPED | OUTPATIENT
Start: 2023-06-28

## 2023-07-11 ENCOUNTER — OFFICE VISIT (OUTPATIENT)
Dept: CARDIOLOGY CLINIC | Age: 68
End: 2023-07-11

## 2023-07-11 VITALS
DIASTOLIC BLOOD PRESSURE: 66 MMHG | WEIGHT: 134 LBS | BODY MASS INDEX: 22.88 KG/M2 | HEART RATE: 79 BPM | OXYGEN SATURATION: 100 % | HEIGHT: 64 IN | SYSTOLIC BLOOD PRESSURE: 102 MMHG

## 2023-07-11 DIAGNOSIS — I48.0 PAROXYSMAL ATRIAL FIBRILLATION (HCC): ICD-10-CM

## 2023-07-11 RX ORDER — METOPROLOL SUCCINATE 25 MG/1
TABLET, EXTENDED RELEASE ORAL
Qty: 90 TABLET | Refills: 3 | Status: SHIPPED | OUTPATIENT
Start: 2023-07-11

## 2023-07-11 RX ORDER — DILTIAZEM HYDROCHLORIDE 240 MG/1
240 CAPSULE, COATED, EXTENDED RELEASE ORAL DAILY
Qty: 90 CAPSULE | Refills: 3 | Status: SHIPPED | OUTPATIENT
Start: 2023-07-11

## 2023-07-11 NOTE — PATIENT INSTRUCTIONS
No changes today     Please call me if you reconsider alternative treatment options     Refills sent to [facility]     Continue to utilize Apple watch     Update labs at your convenience     Follow up in one year or sooner if needed

## 2023-08-02 ENCOUNTER — HOSPITAL ENCOUNTER (OUTPATIENT)
Age: 68
Discharge: HOME | End: 2023-08-02
Payer: MEDICARE

## 2023-08-02 DIAGNOSIS — I48.0 PAROXYSMAL ATRIAL FIBRILLATION (HCC): ICD-10-CM

## 2023-08-02 LAB
ANION GAP SERPL CALCULATED.3IONS-SCNC: 11 MMOL/L (ref 3–16)
BASOPHILS # BLD: 0 K/UL (ref 0–0.2)
BASOPHILS NFR BLD: 0.5 %
BUN SERPL-MCNC: 15 MG/DL (ref 7–20)
CALCIUM SERPL-MCNC: 10 MG/DL (ref 8.3–10.6)
CHLORIDE SERPL-SCNC: 101 MMOL/L (ref 99–110)
CO2 SERPL-SCNC: 30 MMOL/L (ref 21–32)
CREAT SERPL-MCNC: 0.9 MG/DL (ref 0.6–1.2)
DEPRECATED RDW RBC AUTO: 12.6 % (ref 12.4–15.4)
EOSINOPHIL # BLD: 0.2 K/UL (ref 0–0.6)
EOSINOPHIL NFR BLD: 2.5 %
GFR SERPLBLD CREATININE-BSD FMLA CKD-EPI: >60 ML/MIN/{1.73_M2}
GLUCOSE SERPL-MCNC: 110 MG/DL (ref 70–99)
HCT VFR BLD AUTO: 41.7 % (ref 36–48)
HGB BLD-MCNC: 14.1 G/DL (ref 12–16)
LYMPHOCYTES # BLD: 2.1 K/UL (ref 1–5.1)
LYMPHOCYTES NFR BLD: 25.4 %
MCH RBC QN AUTO: 31.3 PG (ref 26–34)
MCHC RBC AUTO-ENTMCNC: 33.9 G/DL (ref 31–36)
MCV RBC AUTO: 92.4 FL (ref 80–100)
MONOCYTES # BLD: 0.7 K/UL (ref 0–1.3)
MONOCYTES NFR BLD: 8.6 %
NEUTROPHILS # BLD: 5.1 K/UL (ref 1.7–7.7)
NEUTROPHILS NFR BLD: 63 %
PLATELET # BLD AUTO: 292 K/UL (ref 135–450)
PMV BLD AUTO: 10 FL (ref 5–10.5)
POTASSIUM SERPL-SCNC: 4.2 MMOL/L (ref 3.5–5.1)
RBC # BLD AUTO: 4.51 M/UL (ref 4–5.2)
SODIUM SERPL-SCNC: 142 MMOL/L (ref 136–145)
WBC # BLD AUTO: 8.1 K/UL (ref 4–11)

## 2023-08-02 PROCEDURE — 80048 BASIC METABOLIC PNL TOTAL CA: CPT

## 2023-08-02 PROCEDURE — 85025 COMPLETE CBC W/AUTO DIFF WBC: CPT

## 2023-08-02 PROCEDURE — 36415 COLL VENOUS BLD VENIPUNCTURE: CPT

## 2023-09-26 ENCOUNTER — OFFICE VISIT (OUTPATIENT)
Dept: DERMATOLOGY | Age: 68
End: 2023-09-26
Payer: MEDICARE

## 2023-09-26 DIAGNOSIS — Z85.828 HISTORY OF BASAL CELL CANCER: ICD-10-CM

## 2023-09-26 DIAGNOSIS — L40.9 PSORIASIS: ICD-10-CM

## 2023-09-26 DIAGNOSIS — D48.5 NEOPLASM OF UNCERTAIN BEHAVIOR OF SKIN: Primary | ICD-10-CM

## 2023-09-26 DIAGNOSIS — D22.9 BENIGN NEVUS: ICD-10-CM

## 2023-09-26 PROCEDURE — 1123F ACP DISCUSS/DSCN MKR DOCD: CPT | Performed by: DERMATOLOGY

## 2023-09-26 PROCEDURE — 11102 TANGNTL BX SKIN SINGLE LES: CPT | Performed by: DERMATOLOGY

## 2023-09-26 PROCEDURE — 99214 OFFICE O/P EST MOD 30 MIN: CPT | Performed by: DERMATOLOGY

## 2023-09-26 RX ORDER — CLOBETASOL PROPIONATE 0.05 G/100ML
SHAMPOO TOPICAL
Qty: 118 ML | Refills: 3 | Status: SHIPPED | OUTPATIENT
Start: 2023-09-26

## 2023-09-26 RX ORDER — TRIAMCINOLONE ACETONIDE 0.25 MG/G
OINTMENT TOPICAL
Qty: 15 G | Refills: 1 | Status: SHIPPED | OUTPATIENT
Start: 2023-09-26

## 2023-09-26 NOTE — PROGRESS NOTES
[facility]) Dermatology  Bess Holter, M.D.  [address] [facility]  1955    70 y.o. female     Date of Visit: 9/26/2023    Chief Complaint:   Chief Complaint   Patient presents with    Skin Lesion        I was asked to see this patient by Dr. Charissa sloan. provider found. History of Present Illness:  1. TBSE    Multiple nevi. Patient has not noticed any new or changing pigmented lesions. Stable in size, shape, color. Not itching, bleeding. History of seborrheic dermatitis-currently flaring throughout her scalp, nose. Using Elidel for her nose and head and shoulder shampoo for her scalp with minimal improvement.   Thicker plaques throughout her scalp    Skin history:  7/14 basal cell carcinoma nose [facility], status post Mohs surgery and forehead flap  11/15 right upper paraspinal back dysplastic nevus, mild, saucerization  11/15 right posterior shoulder dysplastic nevus, mild, extended the biopsy margins on initial biopsy- re-saucerization  4/16 right mid paraspinal back dysplastic nevus, mild  4/16 right anterior shoulder dysplastic nevus, mild  4/16 left mid upper arm dysplastic nevus, mild  3/17 nodular basal cell carcinoma left posterior upper arm status post curettage  11/17 right upper paraspinal back dysplastic nevus with moderate dysplasia  9/18 right lower back dysplastic nevus with mild dysplasia  3/19 left mid lateral back irritated compound nevus, recurrent 9/19 9/19 basal cell carcinoma right lower eyelid status post Mohs and oculoplastics reconstruction   10/20 basal cell carcinoma central chest status post curettage  3/22 right mid dorsal forearm dysplastic nevus with mild dysplasia  3/22 left proximal medial lower leg dysplastic nevus with mild dysplasia  9/22 right lower back junctional dysplastic nevus with moderate dysplasia     AK- LN2     Rosacea- Metrocream .75% 11/17-not helpful-uses Elidel 1% cream as needed 9/20     Seborrheic dermatitis-Elidel 1%

## 2023-09-27 ENCOUNTER — TELEPHONE (OUTPATIENT)
Dept: FAMILY MEDICINE CLINIC | Age: 68
End: 2023-09-27

## 2023-09-29 LAB — DERMATOLOGY PATHOLOGY REPORT: NORMAL

## 2023-10-26 ENCOUNTER — OFFICE VISIT (OUTPATIENT)
Dept: DERMATOLOGY | Age: 68
End: 2023-10-26

## 2023-10-26 DIAGNOSIS — L40.9 PSORIASIS: ICD-10-CM

## 2023-10-26 DIAGNOSIS — D48.5 NEOPLASM OF UNCERTAIN BEHAVIOR OF SKIN: Primary | ICD-10-CM

## 2023-10-26 NOTE — PROGRESS NOTES
[facility] Dermatology  Bess Holter, M.D.  [address] [facility]  1955    70 y.o. female     Date of Visit: 10/26/2023    Chief Complaint:   Chief Complaint   Patient presents with    Skin Lesion     4-6 wk scalp/nose recheck         I was asked to see this patient by Dr. Carrington ref. provider found. History of Present Illness:  1. Patient presents today for follow-up of psoriasis/seborrheic dermatitis. At her last visit was flaring-thick plaques throughout her scalp, also nose, paranasal cheeks, nasofacial grooves. Used triamcinolone 0.025% ointment twice daily alternating with Elidel 1% cream 1 week of each over the last month. Did have some improvement. Initially use clobetasol shampoo every day to her scalp, then reduce down to a few times per week. Scalp still sometimes itchy. A few focal areas of psoriasis   that did not fully resolve.     Returns today to recheck psoriasis face and scalp and also for evaluation for nonmelanoma skin cancer-psoriatic flare at last visit obscured exam of face      Skin history:  7/14 basal cell carcinoma nose [facility], status post Mohs surgery and forehead flap  11/15 right upper paraspinal back dysplastic nevus, mild, saucerization  11/15 right posterior shoulder dysplastic nevus, mild, extended the biopsy margins on initial biopsy- re-saucerization  4/16 right mid paraspinal back dysplastic nevus, mild  4/16 right anterior shoulder dysplastic nevus, mild  4/16 left mid upper arm dysplastic nevus, mild  3/17 nodular basal cell carcinoma left posterior upper arm status post curettage  11/17 right upper paraspinal back dysplastic nevus with moderate dysplasia  9/18 right lower back dysplastic nevus with mild dysplasia  3/19 left mid lateral back irritated compound nevus, recurrent 9/19 9/19 basal cell carcinoma right lower eyelid status post Mohs and oculoplastics reconstruction   10/20 basal cell carcinoma central chest status post

## 2023-11-01 LAB — DERMATOLOGY PATHOLOGY REPORT: ABNORMAL

## 2023-11-02 RX ORDER — FLUOROURACIL 50 MG/G
CREAM TOPICAL
Qty: 40 G | Refills: 0 | Status: SHIPPED | OUTPATIENT
Start: 2023-11-02

## 2023-11-03 ENCOUNTER — TELEPHONE (OUTPATIENT)
Dept: DERMATOLOGY | Age: 68
End: 2023-11-03

## 2023-11-03 NOTE — TELEPHONE ENCOUNTER
Called and spoke to patient clarified Efudex instruction use. Patient scheduled for 11/20/23 at 10 am for Efudex follow up. Mark Number

## 2023-11-03 NOTE — TELEPHONE ENCOUNTER
How many weeks does she use Fluorouracil? Does she wash off the area in between the times she uses the cream? She is supposed to use it two times a day. If she washes it off, does she use water or soap and water?      Phone for pt is [phone]

## 2023-11-20 ENCOUNTER — OFFICE VISIT (OUTPATIENT)
Dept: DERMATOLOGY | Age: 68
End: 2023-11-20
Payer: MEDICARE

## 2023-11-20 DIAGNOSIS — D04.39 SQUAMOUS CELL CARCINOMA IN SITU (SCCIS) OF SKIN OF NOSE: Primary | ICD-10-CM

## 2023-11-20 PROCEDURE — 99214 OFFICE O/P EST MOD 30 MIN: CPT | Performed by: DERMATOLOGY

## 2023-11-20 PROCEDURE — 1123F ACP DISCUSS/DSCN MKR DOCD: CPT | Performed by: DERMATOLOGY

## 2023-11-20 NOTE — PROGRESS NOTES
dorsum-Efudex     AK- LN2     Rosacea- Metrocream .75% 11/17-not helpful-uses Elidel 1% cream as needed 9/20     Seborrheic dermatitis-Elidel 1% cream, zinc pyrithione soap    Review of Systems:  Constitutional: Reports general sense of well-being       Past Medical History, Surgical History, Family History, Medications and Allergies reviewed. Social History:   Social History     Socioeconomic History    Marital status:      Spouse name: Not on file    Number of children: Not on file    Years of education: Not on file    Highest education level: Not on file   Occupational History    Not on file   Tobacco Use    Smoking status: Never    Smokeless tobacco: Never   Vaping Use    Vaping Use: Never used   Substance and Sexual Activity    Alcohol use: Yes     Comment: occas    Drug use: No    Sexual activity: Not on file   Other Topics Concern    Not on file   Social History Narrative    Not on file     Social Determinants of Health     Financial Resource Strain: Low Risk  (8/4/2021)    Overall Financial Resource Strain (CARDIA)     Difficulty of Paying Living Expenses: Not hard at all   Food Insecurity: No Food Insecurity (8/4/2021)    Hunger Vital Sign     Worried About Running Out of Food in the Last Year: Never true     [address] in the Last Year: Never true   Transportation Needs: No Transportation Needs (8/4/2021)    PRAPARE - Transportation     Lack of Transportation (Medical): No     Lack of Transportation (Non-Medical): No   Physical Activity: Not on file   Stress: Not on file   Social Connections: Not on file   Intimate Partner Violence: Not on file   Housing Stability: Not on file       Physical Examination       -General: Well-appearing, NAD  1. Erythematous crusted plaque nasal dorsum with superficial desquamation      Assessment and Plan     1. 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 Aquaphor or Vaseline until healed.

## 2023-11-27 SDOH — ECONOMIC STABILITY: FOOD INSECURITY: WITHIN THE PAST 12 MONTHS, THE FOOD YOU BOUGHT JUST DIDN'T LAST AND YOU DIDN'T HAVE MONEY TO GET MORE.: NEVER TRUE

## 2023-11-27 SDOH — ECONOMIC STABILITY: HOUSING INSECURITY
IN THE LAST 12 MONTHS, WAS THERE A TIME WHEN YOU DID NOT HAVE A STEADY PLACE TO SLEEP OR SLEPT IN A SHELTER (INCLUDING NOW)?: NO

## 2023-11-27 SDOH — ECONOMIC STABILITY: INCOME INSECURITY: HOW HARD IS IT FOR YOU TO PAY FOR THE VERY BASICS LIKE FOOD, HOUSING, MEDICAL CARE, AND HEATING?: NOT HARD AT ALL

## 2023-11-27 SDOH — ECONOMIC STABILITY: FOOD INSECURITY: WITHIN THE PAST 12 MONTHS, YOU WORRIED THAT YOUR FOOD WOULD RUN OUT BEFORE YOU GOT MONEY TO BUY MORE.: NEVER TRUE

## 2023-11-27 ASSESSMENT — PATIENT HEALTH QUESTIONNAIRE - PHQ9
2. FEELING DOWN, DEPRESSED OR HOPELESS: 0
SUM OF ALL RESPONSES TO PHQ QUESTIONS 1-9: 0
SUM OF ALL RESPONSES TO PHQ QUESTIONS 1-9: 0
1. LITTLE INTEREST OR PLEASURE IN DOING THINGS: 0
SUM OF ALL RESPONSES TO PHQ QUESTIONS 1-9: 0
SUM OF ALL RESPONSES TO PHQ QUESTIONS 1-9: 0
SUM OF ALL RESPONSES TO PHQ9 QUESTIONS 1 & 2: 0

## 2023-11-30 ENCOUNTER — OFFICE VISIT (OUTPATIENT)
Dept: FAMILY MEDICINE CLINIC | Age: 68
End: 2023-11-30

## 2023-11-30 VITALS
HEART RATE: 84 BPM | WEIGHT: 140 LBS | HEIGHT: 64 IN | BODY MASS INDEX: 23.9 KG/M2 | SYSTOLIC BLOOD PRESSURE: 90 MMHG | DIASTOLIC BLOOD PRESSURE: 62 MMHG

## 2023-11-30 DIAGNOSIS — Z13.220 LIPID SCREENING: ICD-10-CM

## 2023-11-30 DIAGNOSIS — Z12.31 ENCOUNTER FOR SCREENING MAMMOGRAM FOR MALIGNANT NEOPLASM OF BREAST: ICD-10-CM

## 2023-11-30 DIAGNOSIS — Z13.1 DIABETES MELLITUS SCREENING: ICD-10-CM

## 2023-11-30 DIAGNOSIS — I48.92 ATRIAL FLUTTER, PAROXYSMAL (HCC): Chronic | ICD-10-CM

## 2023-11-30 DIAGNOSIS — Z00.00 MEDICARE ANNUAL WELLNESS VISIT, SUBSEQUENT: Primary | ICD-10-CM

## 2023-11-30 ASSESSMENT — PATIENT HEALTH QUESTIONNAIRE - PHQ9
1. LITTLE INTEREST OR PLEASURE IN DOING THINGS: 0
SUM OF ALL RESPONSES TO PHQ QUESTIONS 1-9: 0
2. FEELING DOWN, DEPRESSED OR HOPELESS: 0
SUM OF ALL RESPONSES TO PHQ QUESTIONS 1-9: 0
SUM OF ALL RESPONSES TO PHQ QUESTIONS 1-9: 0
SUM OF ALL RESPONSES TO PHQ9 QUESTIONS 1 & 2: 0
1. LITTLE INTEREST OR PLEASURE IN DOING THINGS: NOT AT ALL
2. FEELING DOWN, DEPRESSED OR HOPELESS: NOT AT ALL
SUM OF ALL RESPONSES TO PHQ QUESTIONS 1-9: 0
SUM OF ALL RESPONSES TO PHQ9 QUESTIONS 1 & 2: 0

## 2023-11-30 ASSESSMENT — LIFESTYLE VARIABLES
HAS A RELATIVE, FRIEND, DOCTOR, OR ANOTHER HEALTH PROFESSIONAL EXPRESSED CONCERN ABOUT YOUR DRINKING OR SUGGESTED YOU CUT DOWN: 0
HOW OFTEN DURING THE LAST YEAR HAVE YOU FOUND THAT YOU WERE NOT ABLE TO STOP DRINKING ONCE YOU HAD STARTED: 0
HOW OFTEN DURING THE LAST YEAR HAVE YOU HAD A FEELING OF GUILT OR REMORSE AFTER DRINKING: 0
HOW OFTEN DO YOU HAVE A DRINK CONTAINING ALCOHOL: 4 OR MORE TIMES A WEEK
HOW OFTEN DURING THE LAST YEAR HAVE YOU BEEN UNABLE TO REMEMBER WHAT HAPPENED THE NIGHT BEFORE BECAUSE YOU HAD BEEN DRINKING: 0
HAVE YOU OR SOMEONE ELSE BEEN INJURED AS A RESULT OF YOUR DRINKING: 0
HOW MANY STANDARD DRINKS CONTAINING ALCOHOL DO YOU HAVE ON A TYPICAL DAY: 1 OR 2
HOW OFTEN DURING THE LAST YEAR HAVE YOU NEEDED AN ALCOHOLIC DRINK FIRST THING IN THE MORNING TO GET YOURSELF GOING AFTER A NIGHT OF HEAVY DRINKING: 0
HOW OFTEN DURING THE LAST YEAR HAVE YOU FAILED TO DO WHAT WAS NORMALLY EXPECTED FROM YOU BECAUSE OF DRINKING: 0

## 2023-11-30 NOTE — PROGRESS NOTES
Medicare Annual Wellness Visit    Yvette Cassidy is here for Medicare AWV (Annual wellness visit)      Medicare annual wellness visit, subsequent  Recommendations for Preventive Services Due: see orders and patient instructions/AVS.  Recommended screening schedule for the next 5-10 years is provided to the patient in written form: see Patient Eli Glez was seen today for medicare awv. Diagnoses and all orders for this visit:    Medicare annual wellness visit, subsequent  Recommendations for Preventive Services Due: see orders and patient instructions/AVS.  Recommended screening schedule for the next 5-10 years is provided to the patient in written form: see Patient Instructions/AVS.  Atrial flutter, paroxysmal ([address])  Stable on current medication  Follow up with cardiology  Encounter for screening mammogram for malignant neoplasm of breast  -     ZULMA DIGITAL SCREEN W OR WO CAD BILATERAL; Future    Lipid screening  -     Lipid, Fasting; Future    Diabetes mellitus screening  -     Comprehensive Metabolic Panel; Future    Care Gaps Addressed   Tdap- shingles- rsv vaccines declined     Subjective   The following acute and/or chronic problems were also addressed today:  afib    Patient's complete Health Risk Assessment and screening values have been reviewed and are found in Flowsheets. The following problems were reviewed today and where indicated follow up appointments were made and/or referrals ordered.     Afib  Currently taking eliquis- baby asa- toprol- see cardiology [facility]  Positive Risk Factor Screenings with Interventions:                    Vision Screen:  Do you have difficulty driving, watching TV, or doing any of your daily activities because of your eyesight?: No  Have you had an eye exam within the past year?: (!) No last exam 11/22  She will scheduel in the next six months    Interventions:   Patient encouraged to make appointment with their eye

## 2023-12-12 ENCOUNTER — NURSE ONLY (OUTPATIENT)
Dept: FAMILY MEDICINE CLINIC | Age: 68
End: 2023-12-12
Payer: MEDICARE

## 2023-12-12 DIAGNOSIS — Z13.1 DIABETES MELLITUS SCREENING: ICD-10-CM

## 2023-12-12 DIAGNOSIS — Z13.220 LIPID SCREENING: ICD-10-CM

## 2023-12-12 LAB
ALBUMIN SERPL-MCNC: 4.6 G/DL (ref 3.4–5)
ALBUMIN/GLOB SERPL: 2.1 {RATIO} (ref 1.1–2.2)
ALP SERPL-CCNC: 121 U/L (ref 40–129)
ALT SERPL-CCNC: 26 U/L (ref 10–40)
ANION GAP SERPL CALCULATED.3IONS-SCNC: 9 MMOL/L (ref 3–16)
AST SERPL-CCNC: 28 U/L (ref 15–37)
BILIRUB SERPL-MCNC: 0.8 MG/DL (ref 0–1)
BUN SERPL-MCNC: 14 MG/DL (ref 7–20)
CALCIUM SERPL-MCNC: 9.3 MG/DL (ref 8.3–10.6)
CHLORIDE SERPL-SCNC: 103 MMOL/L (ref 99–110)
CHOLEST SERPL-MCNC: 217 MG/DL (ref 0–199)
CO2 SERPL-SCNC: 32 MMOL/L (ref 21–32)
CREAT SERPL-MCNC: 0.7 MG/DL (ref 0.6–1.2)
GFR SERPLBLD CREATININE-BSD FMLA CKD-EPI: >60 ML/MIN/{1.73_M2}
GLUCOSE SERPL-MCNC: 107 MG/DL (ref 70–99)
HDLC SERPL-MCNC: 74 MG/DL (ref 40–60)
LDL CHOLESTEROL CALCULATED: 119 MG/DL
POTASSIUM SERPL-SCNC: 4.8 MMOL/L (ref 3.5–5.1)
PROT SERPL-MCNC: 6.8 G/DL (ref 6.4–8.2)
SODIUM SERPL-SCNC: 144 MMOL/L (ref 136–145)
TRIGL SERPL-MCNC: 122 MG/DL (ref 0–150)
VLDLC SERPL CALC-MCNC: 24 MG/DL

## 2023-12-12 PROCEDURE — 36415 COLL VENOUS BLD VENIPUNCTURE: CPT | Performed by: NURSE PRACTITIONER

## 2024-01-03 ENCOUNTER — OFFICE VISIT (OUTPATIENT)
Dept: DERMATOLOGY | Age: 69
End: 2024-01-03
Payer: MEDICARE

## 2024-01-03 DIAGNOSIS — D04.39 SQUAMOUS CELL CARCINOMA IN SITU (SCCIS) OF SKIN OF NOSE: Primary | ICD-10-CM

## 2024-01-03 PROCEDURE — 99214 OFFICE O/P EST MOD 30 MIN: CPT | Performed by: DERMATOLOGY

## 2024-01-03 PROCEDURE — 1123F ACP DISCUSS/DSCN MKR DOCD: CPT | Performed by: DERMATOLOGY

## 2024-01-03 NOTE — PROGRESS NOTES
Examination       -General: Well-appearing, NAD  1.  Biopsy sites well-healed with no evidence of residual/recurrent squamous cell carcinoma in situ.  Mild postinflammatory erythema.      Assessment and Plan     1. 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  Excellent sun protection.  Would expect improvement of postinflammatory erythema over time.  Recheck at total-body skin exam as previously scheduled

## 2024-02-13 ENCOUNTER — HOSPITAL ENCOUNTER (OUTPATIENT)
Dept: MAMMOGRAPHY | Age: 69
Discharge: HOME | End: 2024-02-13
Payer: MEDICARE

## 2024-02-13 VITALS — WEIGHT: 132 LBS | HEIGHT: 64 IN | BODY MASS INDEX: 22.53 KG/M2

## 2024-02-13 DIAGNOSIS — Z12.31 VISIT FOR SCREENING MAMMOGRAM: ICD-10-CM

## 2024-02-13 PROCEDURE — 77063 BREAST TOMOSYNTHESIS BI: CPT

## 2024-03-26 ENCOUNTER — OFFICE VISIT (OUTPATIENT)
Dept: DERMATOLOGY | Age: 69
End: 2024-03-26
Payer: MEDICARE

## 2024-03-26 DIAGNOSIS — L82.0 SEBORRHEIC KERATOSES, INFLAMED: ICD-10-CM

## 2024-03-26 DIAGNOSIS — L40.9 PSORIASIS: Primary | ICD-10-CM

## 2024-03-26 DIAGNOSIS — Z85.828 HISTORY OF NONMELANOMA SKIN CANCER: ICD-10-CM

## 2024-03-26 DIAGNOSIS — L82.1 SEBORRHEIC KERATOSES: ICD-10-CM

## 2024-03-26 DIAGNOSIS — D22.9 BENIGN NEVUS: ICD-10-CM

## 2024-03-26 PROCEDURE — 17110 DESTRUCTION B9 LES UP TO 14: CPT | Performed by: DERMATOLOGY

## 2024-03-26 PROCEDURE — 99214 OFFICE O/P EST MOD 30 MIN: CPT | Performed by: DERMATOLOGY

## 2024-03-26 PROCEDURE — 1123F ACP DISCUSS/DSCN MKR DOCD: CPT | Performed by: DERMATOLOGY

## 2024-03-26 RX ORDER — CLOBETASOL PROPIONATE 0.05 G/100ML
SHAMPOO TOPICAL
Qty: 118 ML | Refills: 6 | Status: SHIPPED | OUTPATIENT
Start: 2024-03-26

## 2024-03-26 RX ORDER — TRIAMCINOLONE ACETONIDE 0.25 MG/G
OINTMENT TOPICAL
Qty: 15 G | Refills: 6 | Status: SHIPPED | OUTPATIENT
Start: 2024-03-26

## 2024-03-26 RX ORDER — PIMECROLIMUS 10 MG/G
CREAM TOPICAL
Qty: 60 G | Refills: 6 | Status: SHIPPED | OUTPATIENT
Start: 2024-03-26

## 2024-03-26 RX ORDER — PIMECROLIMUS 10 MG/G
CREAM TOPICAL
Qty: 60 G | Refills: 2 | Status: SHIPPED | OUTPATIENT
Start: 2024-03-26 | End: 2024-03-26 | Stop reason: SDUPTHER

## 2024-03-26 NOTE — PROGRESS NOTES
[facility] Dermatology  Yuliana Kingston M.D.  [phone]       Sabrina Johansen  1955    68 y.o. female     Date of Visit: 3/26/2024    Chief Complaint:   Chief Complaint   Patient presents with    Skin Lesion        I was asked to see this patient by Dr. Carrington ref. provider found.    History of Present Illness:  1. TBSE    Multiple nevi. Patient has not noticed any new or changing pigmented lesions.   Stable in size, shape, color. Not itching, bleeding.     Psoriasis flaring-erythematous scaly plaques nose, cheeks, throughout her scalp.  Pruritic.  Has been previously treated with Elidel 1% cream, triamcinolone 0.025% ointment, clobetasol shampoo.  Not using any treatment currently.    New raised papule left nasolabial fold.  Pruritic.  Present for the last 1 to 2 months.  Not bleeding or growing.    Skin history:  7/14 basal cell carcinoma [facility], status post Mohs surgery and forehead flap  11/15 right upper paraspinal back dysplastic nevus, mild, saucerization  11/15 right posterior shoulder dysplastic nevus, mild, extended the biopsy margins on initial biopsy- re-saucerization  4/16 right mid paraspinal back dysplastic nevus, mild  4/16 right anterior shoulder dysplastic nevus, mild  4/16 left mid upper arm dysplastic nevus, mild  3/17 nodular basal cell carcinoma left posterior upper arm status post curettage  11/17 right upper paraspinal back dysplastic nevus with moderate dysplasia  9/18 right lower back dysplastic nevus with mild dysplasia  3/19 left mid lateral back irritated compound nevus, recurrent 9/19 9/19 basal cell carcinoma right lower eyelid status post Mohs and oculoplastics reconstruction   10/20 basal cell carcinoma central chest status post curettage  3/22 right mid dorsal forearm dysplastic nevus with mild dysplasia  3/22 left proximal medial lower leg dysplastic nevus with mild dysplasia  9/22 right lower back junctional dysplastic nevus with moderate

## 2024-05-28 ENCOUNTER — OFFICE VISIT (OUTPATIENT)
Dept: DERMATOLOGY | Age: 69
End: 2024-05-28
Payer: MEDICARE

## 2024-05-28 DIAGNOSIS — Z85.828 HISTORY OF NONMELANOMA SKIN CANCER: ICD-10-CM

## 2024-05-28 DIAGNOSIS — L40.9 PSORIASIS: Primary | ICD-10-CM

## 2024-05-28 PROCEDURE — 99214 OFFICE O/P EST MOD 30 MIN: CPT | Performed by: DERMATOLOGY

## 2024-05-28 PROCEDURE — 1123F ACP DISCUSS/DSCN MKR DOCD: CPT | Performed by: DERMATOLOGY

## 2024-05-28 RX ORDER — TACROLIMUS 1 MG/G
OINTMENT TOPICAL
Qty: 30 G | Refills: 4 | Status: SHIPPED | OUTPATIENT
Start: 2024-05-28

## 2024-05-28 NOTE — PROGRESS NOTES
[facility] Dermatology  Yuliana Kingston M.D.  [phone]       Sabrina Johansen  1955    68 y.o. female     Date of Visit: 5/28/2024    Chief Complaint:   Chief Complaint   Patient presents with    Skin Lesion        I was asked to see this patient by Dr. Linton.    History of Present Illness:  1.  Follow-up psoriasis-patient using clobetasol shampoo 1-2 times per week-not completely clear but much improved and no longer pruritic.  Did use triamcinolone 0.025% ointment twice daily for 1 week, now has been using Elidel 1% cream continuously for the last 3 weeks.  Much improved.  Much less scale and no longer pruritic.  Does note that her insurance will no longer cover Elidel      Skin history:  7/14 basal cell carcinoma [facility], status post Mohs surgery and forehead flap  11/15 right upper paraspinal back dysplastic nevus, mild, saucerization  11/15 right posterior shoulder dysplastic nevus, mild, extended the biopsy margins on initial biopsy- re-saucerization  4/16 right mid paraspinal back dysplastic nevus, mild  4/16 right anterior shoulder dysplastic nevus, mild  4/16 left mid upper arm dysplastic nevus, mild  3/17 nodular basal cell carcinoma left posterior upper arm status post curettage  11/17 right upper paraspinal back dysplastic nevus with moderate dysplasia  9/18 right lower back dysplastic nevus with mild dysplasia  3/19 left mid lateral back irritated compound nevus, recurrent 9/19 9/19 basal cell carcinoma right lower eyelid status post Mohs and oculoplastics reconstruction   10/20 basal cell carcinoma central chest status post curettage  3/22 right mid dorsal forearm dysplastic nevus with mild dysplasia  3/22 left proximal medial lower leg dysplastic nevus with mild dysplasia  9/22 right lower back junctional dysplastic nevus with moderate dysplasia  10/23 squamous cell carcinoma in situ left nasal dorsum and right nasal dorsum-Efudex     AK- LN2     Rosacea-

## 2024-07-07 DIAGNOSIS — I48.0 PAROXYSMAL ATRIAL FIBRILLATION (HCC): ICD-10-CM

## 2024-07-19 ENCOUNTER — OFFICE VISIT (OUTPATIENT)
Dept: CARDIOLOGY CLINIC | Age: 69
End: 2024-07-19
Payer: MEDICARE

## 2024-07-19 VITALS
WEIGHT: 136 LBS | OXYGEN SATURATION: 95 % | HEIGHT: 64 IN | BODY MASS INDEX: 23.22 KG/M2 | HEART RATE: 77 BPM | DIASTOLIC BLOOD PRESSURE: 60 MMHG | SYSTOLIC BLOOD PRESSURE: 100 MMHG

## 2024-07-19 DIAGNOSIS — I48.92 ATRIAL FLUTTER, PAROXYSMAL (HCC): Chronic | ICD-10-CM

## 2024-07-19 DIAGNOSIS — I48.19 PERSISTENT ATRIAL FIBRILLATION (HCC): Primary | ICD-10-CM

## 2024-07-19 PROCEDURE — 99214 OFFICE O/P EST MOD 30 MIN: CPT | Performed by: NURSE PRACTITIONER

## 2024-07-19 PROCEDURE — 1123F ACP DISCUSS/DSCN MKR DOCD: CPT | Performed by: NURSE PRACTITIONER

## 2024-07-19 PROCEDURE — 93000 ELECTROCARDIOGRAM COMPLETE: CPT | Performed by: NURSE PRACTITIONER

## 2024-07-19 RX ORDER — METOPROLOL SUCCINATE 25 MG/1
TABLET, EXTENDED RELEASE ORAL
Qty: 90 TABLET | Refills: 3 | Status: SHIPPED | OUTPATIENT
Start: 2024-07-19

## 2024-07-19 RX ORDER — DILTIAZEM HYDROCHLORIDE 240 MG/1
240 CAPSULE, COATED, EXTENDED RELEASE ORAL DAILY
Qty: 90 CAPSULE | Refills: 3 | Status: SHIPPED | OUTPATIENT
Start: 2024-07-19

## 2024-07-19 NOTE — PATIENT INSTRUCTIONS
No changes today     Refills sent     Continue to monitor heart rate     Follow up in one year or sooner if needed

## 2024-07-19 NOTE — PROGRESS NOTES
are symmetrical and full   Abdomen:  No masses or tenderness  Bowel sounds present  Extremities:   No cyanosis or clubbing   No lower extremity edema   Skin: warm and dry  Neurological:  Alert and oriented  Moves all extremities well  No abnormalities of mood, affect, memory, mentation, or behavior are noted    DATA:    ECG 7/6/2022:  SB 56 bpm     Echo 11/2021:      Summary   Normal left ventricle size, wall thickness, and systolic function with an   estimated ejection fraction of 55-60%.   No regional wall motion abnormalities are seen.   Normal diastolic function.   Moderate mitral regurgitation.   Mild aortic regurgitation.   Mild to moderate tricuspid regurgitation.   The pulmonic valve is not well visualized.   Estimated pulmonary artery systolic pressure is at 21 mmHg assuming a right   atrial pressure of 3 mmHg.      Echo 2/2014:  Summary    -Normal left ventricle size, wall thickness and systolic function with an    estimated ejection fraction of 60%.    -Mild mitral regurgitation is present.    -There is mild tricuspid regurgitation with RVSP estimated at 31 mmHg.       All labs and testing reviewed.  CARDIOLOGY LABS:   CBC: No results for input(s): \"WBC\", \"HGB\", \"HCT\", \"PLT\" in the last 72 hours.  BMP: No results for input(s): \"NA\", \"K\", \"CO2\", \"BUN\", \"CREATININE\", \"LABGLOM\", \"GLUCOSE\" in the last 72 hours.  PT/INR: No results for input(s): \"PROTIME\", \"INR\" in the last 72 hours.  APTT:No results for input(s): \"APTT\" in the last 72 hours.  FASTING LIPID PANEL:  Lab Results   Component Value Date/Time    HDL 74 12/12/2023 07:34 AM    TRIG 95 09/23/2021 07:29 AM     LIVER PROFILE:No results for input(s): \"AST\", \"ALT\" in the last 72 hours.    Invalid input(s): \"ALB\"    IMPRESSION:    Assessment:   Persistent atrial fibrillation:  stable   -[id number] score 2 (age, gender)   Paroxsymal atrial flutter: stable   Mitral valve prolapse  Mild mitral regurgitation   Basal cell carcinoma s/p MOHS procedure

## 2024-09-19 ENCOUNTER — TELEPHONE (OUTPATIENT)
Dept: FAMILY MEDICINE CLINIC | Age: 69
End: 2024-09-19

## 2024-09-25 ENCOUNTER — OFFICE VISIT (OUTPATIENT)
Dept: FAMILY MEDICINE CLINIC | Age: 69
End: 2024-09-25

## 2024-09-25 VITALS
OXYGEN SATURATION: 99 % | HEART RATE: 84 BPM | HEIGHT: 65 IN | WEIGHT: 133.2 LBS | SYSTOLIC BLOOD PRESSURE: 92 MMHG | DIASTOLIC BLOOD PRESSURE: 64 MMHG | BODY MASS INDEX: 22.19 KG/M2

## 2024-09-25 DIAGNOSIS — H25.9 AGE-RELATED CATARACT OF BOTH EYES, UNSPECIFIED AGE-RELATED CATARACT TYPE: Primary | ICD-10-CM

## 2024-09-25 DIAGNOSIS — I48.92 ATRIAL FLUTTER, PAROXYSMAL (HCC): Chronic | ICD-10-CM

## 2024-09-25 DIAGNOSIS — Z01.818 PREOP EXAMINATION: ICD-10-CM

## 2024-09-25 ASSESSMENT — PATIENT HEALTH QUESTIONNAIRE - PHQ9
1. LITTLE INTEREST OR PLEASURE IN DOING THINGS: NOT AT ALL
SUM OF ALL RESPONSES TO PHQ QUESTIONS 1-9: 0
SUM OF ALL RESPONSES TO PHQ9 QUESTIONS 1 & 2: 0
2. FEELING DOWN, DEPRESSED OR HOPELESS: NOT AT ALL
SUM OF ALL RESPONSES TO PHQ QUESTIONS 1-9: 0

## 2024-09-30 ENCOUNTER — OFFICE VISIT (OUTPATIENT)
Dept: DERMATOLOGY | Age: 69
End: 2024-09-30
Payer: MEDICARE

## 2024-09-30 DIAGNOSIS — D48.5 NEOPLASM OF UNCERTAIN BEHAVIOR OF SKIN: Primary | ICD-10-CM

## 2024-09-30 DIAGNOSIS — Z85.828 HISTORY OF NONMELANOMA SKIN CANCER: ICD-10-CM

## 2024-09-30 DIAGNOSIS — D22.9 BENIGN NEVUS: ICD-10-CM

## 2024-09-30 DIAGNOSIS — L40.9 PSORIASIS: ICD-10-CM

## 2024-09-30 PROCEDURE — 1123F ACP DISCUSS/DSCN MKR DOCD: CPT | Performed by: DERMATOLOGY

## 2024-09-30 PROCEDURE — 11103 TANGNTL BX SKIN EA SEP/ADDL: CPT | Performed by: DERMATOLOGY

## 2024-09-30 PROCEDURE — 11102 TANGNTL BX SKIN SINGLE LES: CPT | Performed by: DERMATOLOGY

## 2024-09-30 PROCEDURE — 99214 OFFICE O/P EST MOD 30 MIN: CPT | Performed by: DERMATOLOGY

## 2024-09-30 RX ORDER — CLOBETASOL PROPIONATE 0.05 G/100ML
SHAMPOO TOPICAL
Qty: 118 ML | Refills: 6 | Status: SHIPPED | OUTPATIENT
Start: 2024-09-30

## 2024-09-30 NOTE — PROGRESS NOTES
Safety     Feel physically or emotionally unsafe where currently live: Not on file     Harm by anyone: Not on file     Emotionally Harmed: Not on file   Housing Stability: Unknown (1/23/2024)    Received from [facility]     Housing/Utilities     Worried about losing home: Not on file     Stayed outside house: Not on file     Unable to get utilities: Not on file       Physical Examination       -General: Well-appearing, NAD  1. Normal affect.  Total body skin exam including scalp, face, neck, chest, abdomen, back, bilateral upper extremities, bilateral lower extremities, ocular conjunctiva, external lips, and nails was performed.  Examination normal unless stated below.  Underwear area not examined.  Scattered on the trunk and extremities are multiple well-defined round and oval symmetric smoothly-bordered uniformly brown macules and papules.  Minimal psoriasis today.  Multiple well-healed scars no sign of recurrence  Right proximal lateral lower leg 6 mm irregularly pigmented tan and brown papule rule out malignant melanoma  Right preauricular cheek 6 mm pink papule rule out basal cell carcinoma      Assessment and Plan     1. Neoplasm of uncertain behavior of skin-right preauricular cheek, right proximal lateral lower leg--Discussed possible diagnosis. Patient agreeable to biopsy. Verbal consent obtained after risks (infection, bleeding, scar), benefits and alternatives explained.   -Area(s) to be biopsied were marked with a surgical pen. Site(s) were cleansed with alcohol. Local anesthesia achieved with 1% lidocaine with epinephrine/sodium bicarbonate. Shave biopsy performed with a razor blade. Hemostasis was achieved with aluminum chloride. The wound(s) were dressed with petrolatum and covered with a bandage. Specimen(s) sent to pathology. Pt educated re: risk of bleeding, infection, scar and wound care instructions.     2. Psoriasis-clobetasol shampoo for maintenance, reviewed triamcinolone 0.025%

## 2024-10-07 DIAGNOSIS — C44.319 BASAL CELL CARCINOMA (BCC) OF RIGHT CHEEK: Primary | ICD-10-CM

## 2024-10-07 LAB — DERMATOLOGY PATHOLOGY REPORT: ABNORMAL

## 2024-12-02 SDOH — ECONOMIC STABILITY: FOOD INSECURITY: WITHIN THE PAST 12 MONTHS, YOU WORRIED THAT YOUR FOOD WOULD RUN OUT BEFORE YOU GOT MONEY TO BUY MORE.: NEVER TRUE

## 2024-12-02 SDOH — HEALTH STABILITY: PHYSICAL HEALTH: ON AVERAGE, HOW MANY DAYS PER WEEK DO YOU ENGAGE IN MODERATE TO STRENUOUS EXERCISE (LIKE A BRISK WALK)?: 7 DAYS

## 2024-12-02 SDOH — HEALTH STABILITY: PHYSICAL HEALTH: ON AVERAGE, HOW MANY MINUTES DO YOU ENGAGE IN EXERCISE AT THIS LEVEL?: 30 MIN

## 2024-12-02 SDOH — ECONOMIC STABILITY: FOOD INSECURITY: WITHIN THE PAST 12 MONTHS, THE FOOD YOU BOUGHT JUST DIDN'T LAST AND YOU DIDN'T HAVE MONEY TO GET MORE.: NEVER TRUE

## 2024-12-02 SDOH — ECONOMIC STABILITY: INCOME INSECURITY: HOW HARD IS IT FOR YOU TO PAY FOR THE VERY BASICS LIKE FOOD, HOUSING, MEDICAL CARE, AND HEATING?: NOT HARD AT ALL

## 2024-12-02 ASSESSMENT — LIFESTYLE VARIABLES
HOW OFTEN DURING THE LAST YEAR HAVE YOU BEEN UNABLE TO REMEMBER WHAT HAPPENED THE NIGHT BEFORE BECAUSE YOU HAD BEEN DRINKING: NEVER
HOW OFTEN DURING THE LAST YEAR HAVE YOU FAILED TO DO WHAT WAS NORMALLY EXPECTED FROM YOU BECAUSE OF DRINKING: NEVER
HOW OFTEN DO YOU HAVE SIX OR MORE DRINKS ON ONE OCCASION: 1
HOW OFTEN DURING THE LAST YEAR HAVE YOU FAILED TO DO WHAT WAS NORMALLY EXPECTED FROM YOU BECAUSE OF DRINKING: NEVER
HOW OFTEN DURING THE LAST YEAR HAVE YOU BEEN UNABLE TO REMEMBER WHAT HAPPENED THE NIGHT BEFORE BECAUSE YOU HAD BEEN DRINKING: NEVER
HOW MANY STANDARD DRINKS CONTAINING ALCOHOL DO YOU HAVE ON A TYPICAL DAY: 1
HAVE YOU OR SOMEONE ELSE BEEN INJURED AS A RESULT OF YOUR DRINKING: NO
HOW OFTEN DO YOU HAVE A DRINK CONTAINING ALCOHOL: 4 OR MORE TIMES A WEEK
HOW OFTEN DURING THE LAST YEAR HAVE YOU FOUND THAT YOU WERE NOT ABLE TO STOP DRINKING ONCE YOU HAD STARTED: NEVER
HAS A RELATIVE, FRIEND, DOCTOR, OR ANOTHER HEALTH PROFESSIONAL EXPRESSED CONCERN ABOUT YOUR DRINKING OR SUGGESTED YOU CUT DOWN: NO
HOW OFTEN DURING THE LAST YEAR HAVE YOU HAD A FEELING OF GUILT OR REMORSE AFTER DRINKING: NEVER
HOW OFTEN DURING THE LAST YEAR HAVE YOU HAD A FEELING OF GUILT OR REMORSE AFTER DRINKING: NEVER
HOW OFTEN DURING THE LAST YEAR HAVE YOU NEEDED AN ALCOHOLIC DRINK FIRST THING IN THE MORNING TO GET YOURSELF GOING AFTER A NIGHT OF HEAVY DRINKING: NEVER
HAS A RELATIVE, FRIEND, DOCTOR, OR ANOTHER HEALTH PROFESSIONAL EXPRESSED CONCERN ABOUT YOUR DRINKING OR SUGGESTED YOU CUT DOWN: NO
HOW OFTEN DO YOU HAVE A DRINK CONTAINING ALCOHOL: 5
HAVE YOU OR SOMEONE ELSE BEEN INJURED AS A RESULT OF YOUR DRINKING: NO
HOW OFTEN DURING THE LAST YEAR HAVE YOU NEEDED AN ALCOHOLIC DRINK FIRST THING IN THE MORNING TO GET YOURSELF GOING AFTER A NIGHT OF HEAVY DRINKING: NEVER
HOW MANY STANDARD DRINKS CONTAINING ALCOHOL DO YOU HAVE ON A TYPICAL DAY: 1 OR 2
HOW OFTEN DURING THE LAST YEAR HAVE YOU FOUND THAT YOU WERE NOT ABLE TO STOP DRINKING ONCE YOU HAD STARTED: NEVER

## 2024-12-02 ASSESSMENT — PATIENT HEALTH QUESTIONNAIRE - PHQ9
SUM OF ALL RESPONSES TO PHQ QUESTIONS 1-9: 0
SUM OF ALL RESPONSES TO PHQ QUESTIONS 1-9: 0
SUM OF ALL RESPONSES TO PHQ9 QUESTIONS 1 & 2: 0
SUM OF ALL RESPONSES TO PHQ QUESTIONS 1-9: 0
SUM OF ALL RESPONSES TO PHQ QUESTIONS 1-9: 0
1. LITTLE INTEREST OR PLEASURE IN DOING THINGS: NOT AT ALL
2. FEELING DOWN, DEPRESSED OR HOPELESS: NOT AT ALL

## 2024-12-04 NOTE — PATIENT INSTRUCTIONS
applicable) as well as your Preventive Care list are included within your After Visit Summary for your review.    Other Preventive Recommendations:    A preventive eye exam performed by an eye specialist is recommended every 1-2 years to screen for glaucoma; cataracts, macular degeneration, and other eye disorders.  A preventive dental visit is recommended every 6 months.  Try to get at least 150 minutes of exercise per week or 10,000 steps per day on a pedometer .  Order or download the FREE \"Exercise & Physical Activity: Your Everyday Guide\" from [employer]. Call 1-355.208.2550 or search [employer] online.  You need 8191-1960 mg of calcium and 0726-6061 IU of vitamin D per day. It is possible to meet your calcium requirement with diet alone, but a vitamin D supplement is usually necessary to meet this goal.  When exposed to the sun, use a sunscreen that protects against both UVA and UVB radiation with an SPF of 30 or greater. Reapply every 2 to 3 hours or after sweating, drying off with a towel, or swimming.  Always wear a seat belt when traveling in a car. Always wear a helmet when riding a bicycle or motorcycle.

## 2024-12-05 ENCOUNTER — OFFICE VISIT (OUTPATIENT)
Dept: FAMILY MEDICINE CLINIC | Age: 69
End: 2024-12-05

## 2024-12-05 VITALS
HEIGHT: 64 IN | HEART RATE: 92 BPM | OXYGEN SATURATION: 97 % | DIASTOLIC BLOOD PRESSURE: 60 MMHG | BODY MASS INDEX: 23.15 KG/M2 | SYSTOLIC BLOOD PRESSURE: 100 MMHG | WEIGHT: 135.6 LBS

## 2024-12-05 DIAGNOSIS — Z00.00 MEDICARE ANNUAL WELLNESS VISIT, SUBSEQUENT: Primary | ICD-10-CM

## 2024-12-05 DIAGNOSIS — I48.0 PAF (PAROXYSMAL ATRIAL FIBRILLATION) (HCC): ICD-10-CM

## 2024-12-05 DIAGNOSIS — Z12.31 ENCOUNTER FOR SCREENING MAMMOGRAM FOR MALIGNANT NEOPLASM OF BREAST: ICD-10-CM

## 2024-12-05 DIAGNOSIS — Z13.1 DIABETES MELLITUS SCREENING: ICD-10-CM

## 2024-12-05 DIAGNOSIS — Z12.11 COLON CANCER SCREENING: ICD-10-CM

## 2024-12-05 DIAGNOSIS — Z13.220 LIPID SCREENING: ICD-10-CM

## 2024-12-05 RX ORDER — ESTRADIOL 0.1 MG/G
CREAM VAGINAL
COMMUNITY
Start: 2024-10-22

## 2024-12-05 NOTE — PROGRESS NOTES
Medicare Annual Wellness Visit    Sabrina Johansen is here for Medicare AWV (Medicare AWV, is not fasting)    Assessment & Plan   Lipid screening  Diabetes mellitus screening  PAF (paroxysmal atrial fibrillation) (HCC)  Colon cancer screening    Recommendations for Preventive Services Due: see orders and patient instructions/AVS.  Recommended screening schedule for the next 5-10 years is provided to the patient in written form: see Patient Instructions/AVS.     Sabrina was seen today for medicare awv.    Diagnoses and all orders for this visit:  Medicare annual wellness visit, subsequent  Recommendations for Preventive Services Due: see orders and patient instructions/AVS.  Recommended screening schedule for the next 5-10 years is provided to the patient in written form: see Patient Instructions/AVS.d screening    Lipid screening  -     Lipid, Fasting; Future    Diabetes mellitus screening  PAF (paroxysmal atrial fibrillation) (HCC)  -     Comprehensive Metabolic Panel; Future  Follow up with cardiologist  Colon cancer screening  -     Fecal DNA Colorectal cancer screening (Cologuard)      Encounter for screening mammogram for malignant neoplasm of breast  -     ZULMA DIGITAL SCREEN W OR WO CAD BILATERAL; Future    Flu- shingles -covid vaccine declined        Subjective       Patient's complete Health Risk Assessment and screening values have been reviewed and are found in Flowsheets. The following problems were reviewed today and where indicated follow up appointments were made and/or referrals ordered.    Positive Risk Factor Screenings with Interventions:                    Safety:  Do you have any tripping hazards - loose or unsecured carpets or rugs?: (!) Yes  Do you have non-slip mats or non-slip surfaces or shower bars or grab bars in your shower or bathtub?: (!) No    Interventions:  Patient declined any further interventions or treatment                   Objective   Vitals:    12/05/24 1513   BP: 100/60

## 2025-01-05 DIAGNOSIS — R19.5 POSITIVE COLORECTAL CANCER SCREENING USING COLOGUARD TEST: ICD-10-CM

## 2025-01-05 DIAGNOSIS — Z12.11 COLON CANCER SCREENING: Primary | ICD-10-CM

## 2025-01-05 LAB — NONINV COLON CA DNA+OCC BLD SCRN STL QL: POSITIVE

## 2025-01-09 ENCOUNTER — LAB (OUTPATIENT)
Dept: FAMILY MEDICINE CLINIC | Age: 70
End: 2025-01-09
Payer: MEDICARE

## 2025-01-09 DIAGNOSIS — Z13.220 LIPID SCREENING: Primary | ICD-10-CM

## 2025-01-09 DIAGNOSIS — I48.0 PAF (PAROXYSMAL ATRIAL FIBRILLATION) (HCC): ICD-10-CM

## 2025-01-09 LAB
ALBUMIN SERPL-MCNC: 4.6 G/DL (ref 3.4–5)
ALBUMIN/GLOB SERPL: 2 {RATIO} (ref 1.1–2.2)
ALP SERPL-CCNC: 110 U/L (ref 40–129)
ALT SERPL-CCNC: 37 U/L (ref 10–40)
ANION GAP SERPL CALCULATED.3IONS-SCNC: 11 MMOL/L (ref 3–16)
AST SERPL-CCNC: 34 U/L (ref 15–37)
BILIRUB SERPL-MCNC: 0.7 MG/DL (ref 0–1)
BUN SERPL-MCNC: 17 MG/DL (ref 7–20)
CALCIUM SERPL-MCNC: 9.6 MG/DL (ref 8.3–10.6)
CHLORIDE SERPL-SCNC: 98 MMOL/L (ref 99–110)
CHOLEST SERPL-MCNC: 249 MG/DL (ref 0–199)
CO2 SERPL-SCNC: 28 MMOL/L (ref 21–32)
CREAT SERPL-MCNC: 0.9 MG/DL (ref 0.6–1.2)
GFR SERPLBLD CREATININE-BSD FMLA CKD-EPI: 69 ML/MIN/{1.73_M2}
GLUCOSE SERPL-MCNC: 96 MG/DL (ref 70–99)
HDLC SERPL-MCNC: 81 MG/DL (ref 40–60)
LDLC SERPL CALC-MCNC: 142 MG/DL
POTASSIUM SERPL-SCNC: 4.3 MMOL/L (ref 3.5–5.1)
PROT SERPL-MCNC: 6.9 G/DL (ref 6.4–8.2)
SODIUM SERPL-SCNC: 137 MMOL/L (ref 136–145)
TRIGL SERPL-MCNC: 132 MG/DL (ref 0–150)
VLDLC SERPL CALC-MCNC: 26 MG/DL

## 2025-01-09 PROCEDURE — 36415 COLL VENOUS BLD VENIPUNCTURE: CPT | Performed by: NURSE PRACTITIONER

## 2025-01-15 ENCOUNTER — PROCEDURE VISIT (OUTPATIENT)
Dept: SURGERY | Age: 70
End: 2025-01-15
Payer: MEDICARE

## 2025-01-15 VITALS — SYSTOLIC BLOOD PRESSURE: 128 MMHG | DIASTOLIC BLOOD PRESSURE: 74 MMHG | HEART RATE: 80 BPM

## 2025-01-15 DIAGNOSIS — C44.319 BASAL CELL CARCINOMA OF RIGHT CHEEK: Primary | ICD-10-CM

## 2025-01-15 DIAGNOSIS — Z79.01 ANTICOAGULATED: ICD-10-CM

## 2025-01-15 PROCEDURE — 17312 MOHS ADDL STAGE: CPT | Performed by: DERMATOLOGY

## 2025-01-15 PROCEDURE — 17311 MOHS 1 STAGE H/N/HF/G: CPT | Performed by: DERMATOLOGY

## 2025-01-15 PROCEDURE — 12052 INTMD RPR FACE/MM 2.6-5.0 CM: CPT | Performed by: DERMATOLOGY

## 2025-01-15 NOTE — PATIENT INSTRUCTIONS
[facility] Office Hours:    Monday-Thursday  7:30 AM-4:30 PM    Friday  9:00 AM-1:00 PM      POST-OPERATIVE CARE FOR LIQUID SKIN ADHESIVE             Bandage change after 24 hours    During your procedure today, a liquid skin adhesive was used to close the wound. You do not have to have stiches removed. If has a clear to light purple shiny surface. You do not have to have this removed. It will dissolve (melt away) in about 1-2 weeks. Please follow these instructions to help you recover from your procedure and help your wound heal.    CARING FOR YOUR SURGICAL SITE  The bandage should remain on and completely dry for 24 hours. Do NOT get the bandage wet.  After the first 24 hours, gently remove the remaining part of the bandage. It can be helpful to moisten the bandage edges in the shower.   Be gentle around the area of the wound. Do not scrub, rub or pick at the skin glue. It will gradually dissolve in 1-2 weeks.   Do not shave directly over the wound for one week. You can shave around the area.   After one week you can start cleaning the area gently and resume all normal activity. No further restrictions.  Use Sunscreen with SPF of at least 30 on the area around the wound.    If the dressing comes off or if you have questions, or concerns about the dressing, please call the office for instructions!    POST OPERATIVE INSTRUCTIONS    Activity: it is recommended to avoid strenuous activity such as lifting, pushing, pulling, running, power walking or contact sports for at least 2-7 days or as recommended by your provider.  Eating and drinking: Do not drink alcohol for 48 hours after your procedure. Alcohol increases the chances of bleeding.  Medicines   -If you have discomfort, take Acetaminophen (Tylenol or Extra Strength Tylenol). Follow the instructions and warning on the bottle.    Bleeding: If bleeding occurs, Put firm pressure on the area with gauze for 20 minutes without peeking. If the

## 2025-01-15 NOTE — PROGRESS NOTES
PRE-PROCEDURE SCREENING    Pacemaker/ICD: No  Difficulty with numbing in the past: No  Local Anesthesia Reaction/passing out: No  Latex or adhesive allergy:  No  Any history of reaction to suture or skin glue:  no  Bleeding/Clotting Disorders: No  Anticoagulant Therapy: Yes, eliquis - a-fib  Joint prosthesis: No  Artificial Heart Valve: No  Stroke or Seizures: No  Organ Transplant or Lymphoma: No  Immunosuppression: No  Respiratory Problems: No   
triangulation technique.    The final incision lines were placed with respect for the patient's natural skin tension lines in a linear configuration to avoid functional and aesthetic distortion of adjacent free margins. Following minimal undermining, meticulous hemostasis was obtained with spot monopolar electrocoagulation.  Subcutaneous dead space and dermis were closed using 5-0 Vicryl buried subcutaneous interrupted suture and the epidermis was approximated with   liquid skin adhesive.         WOUND COVERAGE:  The wound was cleaned with normal saline solution, dried off, Aquaphor ointment was applied, and the wound was covered.  A dressing was applied for stabilization and light pressure.  The patient was given detailed oral and written instructions on postoperative care.     There were no complications.  The patient left the Unit in good medical condition.     FOLLOW-UP:  As liquid skin adhesive was placed for epidermal closure, the patient was asked to return if any questions or concerns arose, but otherwise will return to see general dermatology per their instructions.

## 2025-01-16 ENCOUNTER — TELEPHONE (OUTPATIENT)
Dept: SURGERY | Age: 70
End: 2025-01-16

## 2025-01-16 NOTE — TELEPHONE ENCOUNTER
The patient was in the office on 1/15/25 for MOHS procedure located on the right preauricular cheek with ILC repair.  The patient tolerated the procedure well and left the office in good condition.    Pain level on post-operative day 1:  none     Any bleeding episode that required pressure to be held, bandage change or a call to the office or MD?  no     Any other issues?:  no    A post-operative telephone call was placed at 1:16 p.m. in order to check on the patient's recovery process.  The patient reported doing well and had no complaints other than those listed above, if any.  All of the patient's questions were answered.

## 2025-03-06 RX ORDER — TRIAMCINOLONE ACETONIDE 0.25 MG/G
OINTMENT TOPICAL PRN
COMMUNITY

## 2025-03-06 RX ORDER — TRIAMCINOLONE ACETONIDE 1 MG/ML
LOTION TOPICAL PRN
COMMUNITY

## 2025-03-18 ENCOUNTER — TELEPHONE (OUTPATIENT)
Dept: CARDIOLOGY CLINIC | Age: 70
End: 2025-03-18

## 2025-03-18 NOTE — TELEPHONE ENCOUNTER
Leora from  asked for a statis update on Fax from January for cardiac cleance. Leora was advised [facility] cleared pt per [employer] message on 2.3.25 and to have our office fax over a letter.    CARDIAC CLEARANCE REQUEST    What type of procedure are you having:  Colonoscopy  Are you taking any blood thinners:  Eliquis- hold 2 days prior  Type on anesthesia:  Monitor Anesthesia Care   When is your procedure scheduled for:  3.27.25  What physician is performing your procedure:  Dr Juaregui  Phone Number:  [phone]  Fax number to send the letter:   [phone]

## 2025-03-26 ENCOUNTER — ANESTHESIA EVENT (OUTPATIENT)
Dept: ENDOSCOPY | Age: 70
End: 2025-03-26
Payer: MEDICARE

## 2025-03-27 ENCOUNTER — HOSPITAL ENCOUNTER (OUTPATIENT)
Age: 70
Setting detail: OUTPATIENT SURGERY
Discharge: HOME | End: 2025-03-27
Attending: INTERNAL MEDICINE | Admitting: INTERNAL MEDICINE
Payer: MEDICARE

## 2025-03-27 ENCOUNTER — ANESTHESIA (OUTPATIENT)
Dept: ENDOSCOPY | Age: 70
End: 2025-03-27
Payer: MEDICARE

## 2025-03-27 ENCOUNTER — APPOINTMENT (OUTPATIENT)
Dept: ENDOSCOPY | Age: 70
End: 2025-03-27
Attending: INTERNAL MEDICINE
Payer: MEDICARE

## 2025-03-27 VITALS
BODY MASS INDEX: 21.43 KG/M2 | RESPIRATION RATE: 12 BRPM | DIASTOLIC BLOOD PRESSURE: 74 MMHG | HEART RATE: 83 BPM | HEIGHT: 65 IN | TEMPERATURE: 97.9 F | OXYGEN SATURATION: 100 % | SYSTOLIC BLOOD PRESSURE: 111 MMHG | WEIGHT: 128.64 LBS

## 2025-03-27 DIAGNOSIS — Z12.11 SCREEN FOR COLON CANCER: ICD-10-CM

## 2025-03-27 DIAGNOSIS — R19.5 POSITIVE FIT (FECAL IMMUNOCHEMICAL TEST): ICD-10-CM

## 2025-03-27 PROCEDURE — 3700000001 HC ADD 15 MINUTES (ANESTHESIA): Performed by: INTERNAL MEDICINE

## 2025-03-27 PROCEDURE — 88305 TISSUE EXAM BY PATHOLOGIST: CPT

## 2025-03-27 PROCEDURE — 6360000002 HC RX W HCPCS: Performed by: STUDENT IN AN ORGANIZED HEALTH CARE EDUCATION/TRAINING PROGRAM

## 2025-03-27 PROCEDURE — 7100000000 HC PACU RECOVERY - FIRST 15 MIN: Performed by: INTERNAL MEDICINE

## 2025-03-27 PROCEDURE — 2580000003 HC RX 258: Performed by: ANESTHESIOLOGY

## 2025-03-27 PROCEDURE — 7100000001 HC PACU RECOVERY - ADDTL 15 MIN: Performed by: INTERNAL MEDICINE

## 2025-03-27 PROCEDURE — 3609010600 HC COLONOSCOPY POLYPECTOMY SNARE/COLD BIOPSY: Performed by: INTERNAL MEDICINE

## 2025-03-27 PROCEDURE — 2720000010 HC SURG SUPPLY STERILE: Performed by: INTERNAL MEDICINE

## 2025-03-27 PROCEDURE — 2709999900 HC NON-CHARGEABLE SUPPLY: Performed by: INTERNAL MEDICINE

## 2025-03-27 PROCEDURE — 7100000010 HC PHASE II RECOVERY - FIRST 15 MIN: Performed by: INTERNAL MEDICINE

## 2025-03-27 PROCEDURE — 7100000011 HC PHASE II RECOVERY - ADDTL 15 MIN: Performed by: INTERNAL MEDICINE

## 2025-03-27 PROCEDURE — 3700000000 HC ANESTHESIA ATTENDED CARE: Performed by: INTERNAL MEDICINE

## 2025-03-27 RX ORDER — LIDOCAINE HYDROCHLORIDE 20 MG/ML
INJECTION, SOLUTION EPIDURAL; INFILTRATION; INTRACAUDAL; PERINEURAL
Status: DISCONTINUED | OUTPATIENT
Start: 2025-03-27 | End: 2025-03-27 | Stop reason: SDUPTHER

## 2025-03-27 RX ORDER — SODIUM CHLORIDE 9 MG/ML
INJECTION, SOLUTION INTRAVENOUS PRN
Status: DISCONTINUED | OUTPATIENT
Start: 2025-03-27 | End: 2025-03-27 | Stop reason: HOSPADM

## 2025-03-27 RX ORDER — SODIUM CHLORIDE 0.9 % (FLUSH) 0.9 %
5-40 SYRINGE (ML) INJECTION EVERY 12 HOURS SCHEDULED
Status: DISCONTINUED | OUTPATIENT
Start: 2025-03-27 | End: 2025-03-27 | Stop reason: HOSPADM

## 2025-03-27 RX ORDER — PROPOFOL 10 MG/ML
INJECTION, EMULSION INTRAVENOUS
Status: DISCONTINUED | OUTPATIENT
Start: 2025-03-27 | End: 2025-03-27 | Stop reason: SDUPTHER

## 2025-03-27 RX ORDER — SODIUM CHLORIDE 0.9 % (FLUSH) 0.9 %
5-40 SYRINGE (ML) INJECTION PRN
Status: DISCONTINUED | OUTPATIENT
Start: 2025-03-27 | End: 2025-03-27 | Stop reason: HOSPADM

## 2025-03-27 RX ADMIN — LIDOCAINE HYDROCHLORIDE 50 MG: 20 INJECTION, SOLUTION EPIDURAL; INFILTRATION; INTRACAUDAL; PERINEURAL at 08:34

## 2025-03-27 RX ADMIN — PROPOFOL 60 MG: 10 INJECTION, EMULSION INTRAVENOUS at 08:34

## 2025-03-27 RX ADMIN — SODIUM CHLORIDE: 9 INJECTION, SOLUTION INTRAVENOUS at 08:28

## 2025-03-27 RX ADMIN — PROPOFOL 100 MCG/KG/MIN: 10 INJECTION, EMULSION INTRAVENOUS at 08:35

## 2025-03-27 ASSESSMENT — PAIN - FUNCTIONAL ASSESSMENT: PAIN_FUNCTIONAL_ASSESSMENT: 0-10

## 2025-03-27 ASSESSMENT — PAIN SCALES - GENERAL
PAINLEVEL_OUTOF10: 0
PAINLEVEL_OUTOF10: 0

## 2025-03-27 ASSESSMENT — LIFESTYLE VARIABLES: SMOKING_STATUS: 0

## 2025-03-27 ASSESSMENT — ENCOUNTER SYMPTOMS: SHORTNESS OF BREATH: 0

## 2025-03-27 NOTE — ANESTHESIA POSTPROCEDURE EVALUATION
Department of Anesthesiology  Postprocedure Note    Patient: Sabrina Johansen  MRN: [id number]  YOB: 1955  Date of evaluation: 3/27/2025    Procedure Summary       Date: 03/27/25 Room / Location: [address]    Anesthesia Start: 0828 Anesthesia Stop: 0858    Procedure: COLONOSCOPY POLYPECTOMY SNARE/BIOPSY WITH CLIP PLACEMENT X1 TO PREVENT BLEEDING Diagnosis:       Screen for colon cancer      Positive FIT (fecal immunochemical test)      (Screen for colon cancer [Z12.11])      (Positive FIT (fecal immunochemical test) [R19.5])    Surgeons: Campbell Watts Jr., DO Responsible Provider: Denny Soriano MD    Anesthesia Type: MAC ASA Status: 3            Anesthesia Type: MAC    Earle Phase I: Earle Score: 9    Earle Phase II: Earle Score: 10    Anesthesia Post Evaluation    Patient location during evaluation: PACU  Patient participation: complete - patient participated  Level of consciousness: awake  Airway patency: patent  Nausea & Vomiting: no nausea and no vomiting  Cardiovascular status: hemodynamically stable and blood pressure returned to baseline  Respiratory status: spontaneous ventilation, nonlabored ventilation and room air  Hydration status: stable  Comments: Uneventful MAC anesthetic. Ms. Johansen was seen resting comfortably following her procedure. Appropriate for return to [facility] for planned discharge home with .   Pain management: adequate    No notable events documented.

## 2025-03-27 NOTE — H&P
Pre-operative History and Physical    Patient: Sabrina Johansen  : 1955  Acct#:     Intended Procedure:  Colonoscopy    HISTORY OF PRESENT ILLNESS:  The patient is a 69 y.o. female  who presents for/due to Cologuard testing      Past Medical History:        Diagnosis Date    Atrial fibrillation (HCC)     See Health Issues    Atrial flutter, paroxysmal (HCC) 2014    Dr Zuñiga= post viral infection no sx 2015    BCC (basal cell carcinoma), face     Broken arm 2018    right arm    Chronic eczema     FACE    Hyperlipidemia     NO MEDS    Squamous cell skin cancer     FACE     Past Surgical History:        Procedure Laterality Date    COSMETIC SURGERY      On nose    FRACTURE SURGERY      Right shoulder/upper arm repair    MOHS SURGERY  2019    SKIN CANCER EXCISION  2014    BCC left nostril     Medications Prior to Admission:   No current facility-administered medications on file prior to encounter.     Current Outpatient Medications on File Prior to Encounter   Medication Sig Dispense Refill    triamcinolone (KENALOG) 0.025 % ointment Apply topically as needed      triamcinolone (KENALOG) 0.1 % lotion Apply topically as needed      dilTIAZem (CARDIZEM CD) 240 MG extended release capsule Take 1 capsule by mouth daily 90 capsule 3    metoprolol succinate (TOPROL XL) 25 MG extended release tablet TAKE 1 TABLET BY MOUTH EVERY DAY 90 tablet 3    tacrolimus (PROTOPIC) 0.1 % ointment Apply to face twice daily (Patient taking differently: Apply topically as needed Apply to face twice daily) 30 g 4    aspirin (ASPIRIN CHILDRENS) 81 MG chewable tablet Take 1 tablet by mouth daily. 30 tablet 0    estradiol (ESTRACE) 0.1 MG/GM vaginal cream Apply small amount to affected area 2 times weekly.      Clobetasol Propionate (CLOBEX) 0.05 % SHAM Apply to scalp daily, leave on for 10 minutes and then rinse out 118 mL 6    apixaban (ELIQUIS) 5 MG TABS tablet TAKE 1 TABLET BY MOUTH 2 TIMES A DAY

## 2025-03-27 NOTE — DISCHARGE INSTRUCTIONS
Recommendations:  Await pathology.     The patient had colon polyp(s) successfully removed today. Theres is a small risk for these sites to bleed for up to several weeks out from the procedure. The patient is instructed to call if there is any significant amounts of  rectal bleeding, abdominal pain, dizziness, or other complaints thought to be related to the procedure.      The patient is recommended to have a repeat colonoscopy in 3-5 years.  This will be determined after the biopsies of the colon polyps are reviewed.      The patient had a metallic clipping device applied today during their procedure to decrease the risk of post polypectomy bleeding.  These clips will spontaneously pass out with the stool in the next several weeks.  The patient should not have an MRI for the next 30 days.      Discharge Instructions for Colonoscopy     Colonoscopy is a visual exam of the lining of the large intestine, also called the bowel or colon, with a colonoscope. A colonoscope is a flexible tube with a light and a viewing device. It allows the doctor to view the inside of the colon through a tiny video camera.     Colonoscopy is performed for many reasons: unexplained anemia , pain, diarrhea , bloody stools, cancer screening, among many other reasons.     Complications from a colonoscopy are rare. Some possible serious complications include perforated bowel (which might require surgery) and bleeding (which could require blood transfusion ). Minor complications include bloating, gas, and cramping that can last for 1-2 days after the procedure.     Because air is put into your colon during the procedure, it is normal to pass large amounts of air from your rectum. You may not have a bowel movement for 1-3 days after the procedure.     What You Will Need:  Someone to drive you home after the procedure     Steps to Take:  Home Care -  Rest when you get home.   Because the sedative will make you drowsy, don't drive, operate

## 2025-03-27 NOTE — PROGRESS NOTES
Discharge instructions reviewed with patient/responsible adult. All home medications have been reviewed, questions answered and patient verbalized understanding.  Discharge instructions signed and copies given. Patient discharged with belongings. IV removed. Pt stable ready for d/c home.     
Medications administered and monitored by CRNA, see anesthesia record.   
Pt arrived to phase 2 from pacu a&o. VSS on monitor. Pt breathes easy on RA. AB soft. Pt denies pain and nausea. Pt up to chair from bed tolerates transfer well. Snack and drink given. Family called to bedside. Pt up in chair call light and table in reach.  
Pt out to car by w/c. Pt d/c home with family.  
Pt to PACU from OR. Pt vital signs WNL, on room air. Pt groggy but awake; A&Ox4. Pt denies pain or nausea. IV infusing well. Warm blanket applied for comfort.   
bring valuables.    Do not wear any make-up on face or nail polish on your fingers or toes.      For your safety, please do not wear any jewelry or body piercing's on the day of surgery.   All jewelry must be removed.      If you have dentures, they will be removed before going to operating room.    For your convenience, we will provide you with a container.    If you wear contact lenses or glasses, they will be removed, please bring a case for them.     If you have a living will and a durable power of  for healthcare, please bring in a copy.     As part of our patient safety program to minimize surgical site infections, we ask you to do the following:    Please notify your surgeon if you develop any illness between         now and the  day of your surgery.    This includes a cough, cold, fever, sore throat, nausea,         or vomiting, and diarrhea, etc.   Please notify your surgeon if you experience dizziness, shortness         of breath or blurred vision between now and the time of your surgery.      Do not shave your operative site 96 hours prior to surgery.   For face and neck surgery, men may use an electric razor 48 hours   prior to surgery.    You must shower the night before surgery and the morning of   your surgery with an antibacterial soap.    You will need to bring a photo ID and insurance card.    [facility] has an onsite pharmacy, would you like to utilize our pharmacy.     If you will be staying overnight and use a C-pap machine, please bring   your C-pap to hospital.     Our goal is to provide you with excellent care, therefore, visitors will be limited to two in the room at a time so that we may focus on providing this care for you.          Please contact pre-admission testing if you have any further questions.                 [facility] phone number:  [phone]     [facility] fax number:  [phone]  Please note these are generalized instructions for all surgical cases, you may be

## 2025-03-27 NOTE — OP NOTE
Colonoscopy Procedure Note      Patient: Sabrina Johansen  : 1955  Acct#:     Procedure: Colonoscopy with polypectomy (cold snare), clip application    Date:  3/27/2025    Surgeon:  Campbell Watts Jr, DO    Referring Physician:  Shakira Linton, APRN - CNP    Previous Colonoscopy: N/A  Date: N/A  Greater than 3 years: N/A    Preoperative Diagnosis:  1) Positive Cologuard testing    Postoperative Diagnosis:  1) A 10 mm sessile Sisi 0-IIb polyp in the cecum was removed completely with cold snare polypectomy. A single hemoclip was successfully deployed to the polypectomy site to decrease the risks of post polypectomy bleeding 2) A 5 mm polyp in the cecum was removed completely with cold snare polypectomy.  3) Mild right sided and moderate left sided diverticulosis was noted.  4) Moderate internal hemorrhoids were noted      Consent:  The patient or their legal guardian has signed a consent, and is aware of the potential risks, benefits, alternatives, and potential complications of this procedure.  These include, but are not limited to hemorrhage, bleeding, post procedural pain, perforation, phlebitis, aspiration, hypotension, hypoxia, cardiovascular events such as arryhthmia, and possibly death.  Additionally, the possibility of missed colonic polyps and interval colon cancer was discussed in the consent.    Anesthesia:  The patient was administered TIVA per anesthesiology team.  Please see their operative records for full details of medications administered.       Procedure:   An informed consent was obtained from the patient after explanation of indications, benefits, possible risks and complications of the procedure.  The patient was then taken to the endoscopy suite, placed in the left lateral decubitus position, and the above IV anesthesia was administered.    A digital rectal examination was performed and revealed negative without mass, lesions or tenderness, internal hemorrhoids

## 2025-03-27 NOTE — ANESTHESIA PRE PROCEDURE
Department of Anesthesiology  Preprocedure Note       Name:  Sabrina Johansen   Age:  69 y.o.  :  1955                                          MRN:  [id number]         Date:  3/27/2025      Surgeon: Surgeon(s):  Campbell Watts Jr., DO    Procedure: Procedure(s):  COLONOSCOPY    Medications prior to admission:   Prior to Admission medications    Medication Sig Start Date End Date Taking? Authorizing Provider   triamcinolone (KENALOG) 0.025 % ointment Apply topically as needed   Yes Praveen Samuels MD   triamcinolone (KENALOG) 0.1 % lotion Apply topically as needed   Yes Praveen Samuels MD   tacrolimus (PROTOPIC) 0.1 % ointment Apply to face twice daily  Patient taking differently: Apply topically as needed Apply to face twice daily 24  Yes Yuliana Kingston MD   estradiol (ESTRACE) 0.1 MG/GM vaginal cream Apply small amount to affected area 2 times weekly. 10/22/24   Praveen Samuels MD   Clobetasol Propionate (CLOBEX) 0.05 % SHAM Apply to scalp daily, leave on for 10 minutes and then rinse out 24   Yuliana Kingston MD   apixaban (ELIQUIS) 5 MG TABS tablet TAKE 1 TABLET BY MOUTH 2 TIMES A DAY 24   July Najera APRN - CNP   dilTIAZem (CARDIZEM CD) 240 MG extended release capsule Take 1 capsule by mouth daily 24   July Najera APRN - CNP   metoprolol succinate (TOPROL XL) 25 MG extended release tablet TAKE 1 TABLET BY MOUTH EVERY DAY 24   July Najera APRN - CNP   triamcinolone (KENALOG) 0.025 % ointment Apply BID for up to 1 week 3/26/24   Yuliana Kingston MD   Multiple Vitamins-Minerals (MULTIVITAMIN ADULT PO) Take 1 tablet by mouth daily    Praveen Samuels MD   aspirin (ASPIRIN CHILDRENS) 81 MG chewable tablet Take 1 tablet by mouth daily. 14   Nathanael Wylie MD       Current medications:    Current Facility-Administered Medications   Medication Dose Route Frequency Provider Last Rate Last Admin    sodium chloride flush 0.9 %

## 2025-04-02 ENCOUNTER — OFFICE VISIT (OUTPATIENT)
Age: 70
End: 2025-04-02

## 2025-04-02 DIAGNOSIS — D22.9 BENIGN NEVUS: ICD-10-CM

## 2025-04-02 DIAGNOSIS — L82.1 SEBORRHEIC KERATOSES: ICD-10-CM

## 2025-04-02 DIAGNOSIS — L82.0 SEBORRHEIC KERATOSES, INFLAMED: ICD-10-CM

## 2025-04-02 DIAGNOSIS — L40.9 PSORIASIS: ICD-10-CM

## 2025-04-02 DIAGNOSIS — D48.5 NEOPLASM OF UNCERTAIN BEHAVIOR OF SKIN: Primary | ICD-10-CM

## 2025-04-02 DIAGNOSIS — Z85.828 HISTORY OF NONMELANOMA SKIN CANCER: ICD-10-CM

## 2025-04-02 NOTE — PROGRESS NOTES
[facility]  Yuliana Kingston M.D.  [phone]       Sabrina Johansen  1955    69 y.o. female     Date of Visit: 4/2/2025    Chief Complaint:   Chief Complaint   Patient presents with    Skin Lesion        I was asked to see this patient by Dr. Linton.    History of Present Illness:  1.     History of Present Illness       TBSE    Multiple nevi. Patient has not noticed any new or changing pigmented lesions.   Stable in size, shape, color. Not itching, bleeding.     Psoriasis-uses clobetasol shampoo about once weekly.  Uses triamcinolone 0.025% ointment for her face as needed for a few days at a time.  Tried Protopic but it is irritating especially around her eyelids.    Widespread seborrheic keratoses torso, neck.  1 right mid neck becoming irritated-raised, pruritic.  Others are asymptomatic      Skin history:  7/14 basal cell carcinoma [facility], status post Mohs surgery and forehead flap  11/15 right upper paraspinal back dysplastic nevus, mild, saucerization  11/15 right posterior shoulder dysplastic nevus, mild, extended the biopsy margins on initial biopsy- re-saucerization  4/16 right mid paraspinal back dysplastic nevus, mild  4/16 right anterior shoulder dysplastic nevus, mild  4/16 left mid upper arm dysplastic nevus, mild  3/17 nodular basal cell carcinoma left posterior upper arm status post curettage  11/17 right upper paraspinal back dysplastic nevus with moderate dysplasia  9/18 right lower back dysplastic nevus with mild dysplasia  3/19 left mid lateral back irritated compound nevus, recurrent 9/19 9/19 basal cell carcinoma right lower eyelid status post Mohs and oculoplastics reconstruction   10/20 basal cell carcinoma central chest status post curettage  3/22 right mid dorsal forearm dysplastic nevus with mild dysplasia  3/22 left proximal medial lower leg dysplastic nevus with mild dysplasia  9/22 right lower back junctional dysplastic nevus with moderate

## 2025-04-07 LAB — DERMATOLOGY PATHOLOGY REPORT: NORMAL

## 2025-04-09 ENCOUNTER — RESULTS FOLLOW-UP (OUTPATIENT)
Age: 70
End: 2025-04-09

## 2025-04-23 ENCOUNTER — HOSPITAL ENCOUNTER (OUTPATIENT)
Dept: MAMMOGRAPHY | Age: 70
Discharge: HOME | End: 2025-04-23
Payer: MEDICARE

## 2025-04-23 VITALS — BODY MASS INDEX: 22.2 KG/M2 | HEIGHT: 64 IN | WEIGHT: 130 LBS

## 2025-04-23 DIAGNOSIS — Z12.31 ENCOUNTER FOR SCREENING MAMMOGRAM FOR MALIGNANT NEOPLASM OF BREAST: ICD-10-CM

## 2025-04-23 PROCEDURE — 77067 SCR MAMMO BI INCL CAD: CPT

## 2025-04-24 ENCOUNTER — RESULTS FOLLOW-UP (OUTPATIENT)
Dept: FAMILY MEDICINE CLINIC | Age: 70
End: 2025-04-24

## (undated) DEVICE — REPLAY HEMOSTASIS CLIP, 16MM SPAN: Brand: REPLAY

## (undated) DEVICE — ENDOSCOPY KIT: Brand: MEDLINE INDUSTRIES, INC.

## (undated) DEVICE — SNARES COLD OVAL 10MM THIN